# Patient Record
Sex: MALE | Race: WHITE | Employment: OTHER | URBAN - METROPOLITAN AREA
[De-identification: names, ages, dates, MRNs, and addresses within clinical notes are randomized per-mention and may not be internally consistent; named-entity substitution may affect disease eponyms.]

---

## 2021-12-02 ENCOUNTER — OFFICE VISIT (OUTPATIENT)
Dept: SPORTS MEDICINE | Age: 76
End: 2021-12-02
Payer: MEDICARE

## 2021-12-02 VITALS — BODY MASS INDEX: 25.77 KG/M2 | TEMPERATURE: 96.8 F | WEIGHT: 180 LBS | HEIGHT: 70 IN

## 2021-12-02 DIAGNOSIS — M19.011 PRIMARY OSTEOARTHRITIS OF RIGHT SHOULDER: Primary | ICD-10-CM

## 2021-12-02 PROCEDURE — 99214 OFFICE O/P EST MOD 30 MIN: CPT | Performed by: FAMILY MEDICINE

## 2021-12-02 RX ORDER — DICLOFENAC SODIUM 20 MG/G
2 SOLUTION TOPICAL 2 TIMES DAILY
Qty: 112 G | Refills: 0 | Status: SHIPPED | OUTPATIENT
Start: 2021-12-02

## 2021-12-02 ASSESSMENT — ENCOUNTER SYMPTOMS
SINUS PAIN: 0
EYE DISCHARGE: 0
ABDOMINAL DISTENTION: 0
APNEA: 0
CHEST TIGHTNESS: 0
FACIAL SWELLING: 0

## 2021-12-02 NOTE — PROGRESS NOTES
[facility] ([facility]) Physicians  Neurosurgery and Pain [facility]  [address], [address] [facility] , [address] [facility], [place] [address]: [phone]  F: [phone]      Peter Garcia  (1945)    12/2/2021    Subjective:     Peter Garcia is 68 y.o. male who complains today of:    Chief Complaint   Patient presents with    Shoulder Pain     Right Shouldr Pain - Started about a Month Ago       HPI     Patient comes in complaining of right shoulder pain for about a month's time he denies any trauma states has had shoulder problems in the past and noticed it more as he is been playing some golf he says he also had some other issues taken care of and and he thinks that it may have played a role in his shoulder pain he had some surgeries of his back  Allergies:  Sulfa antibiotics    Past Medical History:   Diagnosis Date    CAD (coronary artery disease)     Cancer ([address]     Osteoarthritis      Past Surgical History:   Procedure Laterality Date    CARPAL TUNNEL RELEASE      KNEE SURGERY      SPINE SURGERY       No family history on file. Social History     Socioeconomic History    Marital status:      Spouse name: Not on file    Number of children: Not on file    Years of education: Not on file    Highest education level: Not on file   Occupational History    Not on file   Tobacco Use    Smoking status: Never Smoker    Smokeless tobacco: Never Used   Substance and Sexual Activity    Alcohol use:  Yes     Alcohol/week: 1.0 standard drink     Types: 1 Glasses of wine per week    Drug use: Never    Sexual activity: Not on file   Other Topics Concern    Not on file   Social History Narrative    Not on file     Social Determinants of Health     Financial Resource Strain:     Difficulty of Paying Living Expenses: Not on file   Food Insecurity:     Worried About Running Out of Food in the Last Year: Not on file    Janes of Food in the Last Year: Not on file   Transportation Needs:     dizziness. Hematological: Negative for adenopathy. Psychiatric/Behavioral: Negative for agitation, behavioral problems and confusion. Objective:     Vitals:  Temp 96.8 °F (36 °C) (Infrared)   Ht 5' 10\" (1.778 m)   Wt 180 lb (81.6 kg)   BMI 25.83 kg/m² Pain Score:   3      Physical Exam  Constitutional:       Appearance: Normal appearance. Skin:     General: Skin is warm and dry. Neurological:      Mental Status: He is alert. Ortho Exam   Examination of the right shoulder revealed the neurovascular muscular status to be intact mediocre range of motion of only about 100 degrees abduction forward flexion rotator cuff intact positive [place] sign positive impingement signs    Assessment:      Diagnosis Orders   1. Primary osteoarthritis of right shoulder  Ambulatory referral to Physical Therapy    XR SHOULDER RIGHT (MIN 2 VIEWS)       Plan:   Status sent for evaluation and treatment and get some shoulder x-rays as well as send the PT also can have him try some Pennsaid see him back in 3 weeks if he is no better consider further testing       Orders Placed This Encounter   Medications    Diclofenac Sodium (PENNSAID) 2 % SOLN     Sig: Apply 2 Pump topically 2 times daily     Dispense:  112 g     Refill:  0       Orders Placed This Encounter   Procedures    XR SHOULDER RIGHT (MIN 2 VIEWS)     Standing Status:   Future     Standing Expiration Date:   12/2/2022    Ambulatory referral to Physical Therapy     Referral Priority:   Routine     Referral Type:   Physical Medicine     Requested Specialty:   Physical Therapy     Number of Visits Requested:   1         Follow up:  Return in about 3 weeks (around 12/23/2021).     TEREZA DUFFY DO

## 2021-12-09 ENCOUNTER — TELEPHONE (OUTPATIENT)
Dept: PAIN MANAGEMENT | Age: 76
End: 2021-12-09

## 2022-01-11 ENCOUNTER — OFFICE VISIT (OUTPATIENT)
Dept: SPORTS MEDICINE | Age: 77
End: 2022-01-11
Payer: MEDICARE

## 2022-01-11 ENCOUNTER — TELEPHONE (OUTPATIENT)
Dept: PAIN MANAGEMENT | Age: 77
End: 2022-01-11

## 2022-01-11 VITALS — BODY MASS INDEX: 26.48 KG/M2 | TEMPERATURE: 96.8 F | WEIGHT: 185 LBS | HEIGHT: 70 IN

## 2022-01-11 DIAGNOSIS — M19.011 PRIMARY OSTEOARTHRITIS OF RIGHT SHOULDER: Primary | ICD-10-CM

## 2022-01-11 PROCEDURE — 20611 DRAIN/INJ JOINT/BURSA W/US: CPT | Performed by: FAMILY MEDICINE

## 2022-01-11 RX ORDER — LIDOCAINE HYDROCHLORIDE 10 MG/ML
3 INJECTION, SOLUTION INFILTRATION; PERINEURAL ONCE
Status: COMPLETED | OUTPATIENT
Start: 2022-01-11 | End: 2022-01-11

## 2022-01-11 RX ORDER — BUPIVACAINE HYDROCHLORIDE 5 MG/ML
3 INJECTION, SOLUTION EPIDURAL; INTRACAUDAL ONCE
Status: COMPLETED | OUTPATIENT
Start: 2022-01-11 | End: 2022-01-11

## 2022-01-11 RX ORDER — BETAMETHASONE SODIUM PHOSPHATE AND BETAMETHASONE ACETATE 3; 3 MG/ML; MG/ML
12 INJECTION, SUSPENSION INTRA-ARTICULAR; INTRALESIONAL; INTRAMUSCULAR; SOFT TISSUE ONCE
Status: COMPLETED | OUTPATIENT
Start: 2022-01-11 | End: 2022-01-11

## 2022-01-11 RX ADMIN — BUPIVACAINE HYDROCHLORIDE 15 MG: 5 INJECTION, SOLUTION EPIDURAL; INTRACAUDAL at 15:13

## 2022-01-11 RX ADMIN — BETAMETHASONE SODIUM PHOSPHATE AND BETAMETHASONE ACETATE 12 MG: 3; 3 INJECTION, SUSPENSION INTRA-ARTICULAR; INTRALESIONAL; INTRAMUSCULAR; SOFT TISSUE at 15:14

## 2022-01-11 RX ADMIN — LIDOCAINE HYDROCHLORIDE 3 ML: 10 INJECTION, SOLUTION INFILTRATION; PERINEURAL at 15:12

## 2022-01-11 ASSESSMENT — PAIN SCALES - GENERAL: PAINLEVEL_OUTOF10: 3

## 2022-03-31 ENCOUNTER — OFFICE VISIT (OUTPATIENT)
Dept: SPORTS MEDICINE | Age: 77
End: 2022-03-31
Payer: MEDICARE

## 2022-03-31 VITALS
HEIGHT: 70 IN | WEIGHT: 185 LBS | BODY MASS INDEX: 26.48 KG/M2 | DIASTOLIC BLOOD PRESSURE: 72 MMHG | TEMPERATURE: 96.2 F | SYSTOLIC BLOOD PRESSURE: 130 MMHG

## 2022-03-31 DIAGNOSIS — M12.811 ROTATOR CUFF ARTHROPATHY, RIGHT: Primary | ICD-10-CM

## 2022-03-31 PROCEDURE — 99214 OFFICE O/P EST MOD 30 MIN: CPT | Performed by: FAMILY MEDICINE

## 2022-03-31 ASSESSMENT — ENCOUNTER SYMPTOMS
SINUS PAIN: 0
CHEST TIGHTNESS: 0
EYE DISCHARGE: 0
APNEA: 0
FACIAL SWELLING: 0
ABDOMINAL DISTENTION: 0

## 2022-03-31 NOTE — PROGRESS NOTES
 Days of Exercise per Week: Not on file    Minutes of Exercise per Session: Not on file   Stress:     Feeling of Stress : Not on file   Social Connections:     Frequency of Communication with Friends and Family: Not on file    Frequency of Social Gatherings with Friends and Family: Not on file    Attends Sikhism Services: Not on file    Active Member of [address] [place] [address] [employer] or Organizations: Not on file    Attends Club or Organization Meetings: Not on file    Marital Status: Not on file   Intimate Partner Violence:     Fear of Current or Ex-Partner: Not on file    Emotionally Abused: Not on file    Physically Abused: Not on file    Sexually Abused: Not on file   Housing Stability:     Unable to Pay for Housing in the Last Year: Not on file    Number of [place] in the Last Year: Not on file    Unstable Housing in the Last Year: Not on file       Current Outpatient Medications on File Prior to Visit   Medication Sig Dispense Refill    Diclofenac Sodium (PENNSAID) 2 % SOLN Apply 2 Pump topically 2 times daily 112 g 0    amLODIPine (NORVASC) 10 MG tablet TAKE 1 TABLET BY MOUTH EVERY DAY      dexlansoprazole (DEXILANT) 60 MG CPDR delayed release capsule TK 1 C PO QAM      losartan (COZAAR) 100 MG tablet TAKE 1 TABLET BY MOUTH EVERY DAY      rosuvastatin (CRESTOR) 20 MG tablet TAKE 1 TABLET BY MOUTH EVERY DAY       No current facility-administered medications on file prior to visit. Review of Systems   Constitutional: Negative for activity change and fever. HENT: Negative for congestion, facial swelling and sinus pain. Eyes: Negative for discharge. Respiratory: Negative for apnea and chest tightness. Gastrointestinal: Negative for abdominal distention. Endocrine: Negative for cold intolerance. Genitourinary: Negative for difficulty urinating and dysuria. Allergic/Immunologic: Negative for immunocompromised state. Neurological: Negative for dizziness.    Hematological: Negative for adenopathy. Psychiatric/Behavioral: Negative for agitation, behavioral problems and confusion. Objective:     Vitals:  /72 (Site: Left Upper Arm)   Temp 96.2 °F (35.7 °C) (Infrared)   Ht 5' 10\" (1.778 m)   Wt 185 lb (83.9 kg)   BMI 26.54 kg/m² Pain Score:   5      Physical Exam  Constitutional:       Appearance: Normal appearance. Skin:     General: Skin is warm and dry. Neurological:      Mental Status: He is alert. Ortho Exam   Examination of the right shoulder revealed the neurovascular muscular status to be intact there is positive supraspinatus sign limited range of motion globally about 100 degrees of abduction and forward flexion internal rotation is to L1 equivocal [place] signs equivocal impingement signs    Reviewed x-rays of the right shoulder done in February by previous provider    Assessment:      Diagnosis Orders   1. Rotator cuff arthropathy, right  Amb External Referral To Physical Therapy       Plan: We will start the patient on a PT program and see if he can improve on his range of motion and see him back in 3 to 4 weeks if he is no better consider injection in the shoulder       No orders of the defined types were placed in this encounter. Orders Placed This Encounter   Procedures    Amb External Referral To Physical Therapy     Referral Priority:   Routine     Referral Type:   Consult for Advice and Opinion     Referral Reason:   Patient Preference     Requested Specialty:   Physical Therapy     Number of Visits Requested:   1         Follow up:  Return in about 3 weeks (around 4/21/2022).     TEREZA DUFFY DO

## 2022-05-05 ENCOUNTER — OFFICE VISIT (OUTPATIENT)
Dept: SPORTS MEDICINE | Age: 77
End: 2022-05-05
Payer: MEDICARE

## 2022-05-05 VITALS
BODY MASS INDEX: 26.48 KG/M2 | HEIGHT: 70 IN | SYSTOLIC BLOOD PRESSURE: 128 MMHG | DIASTOLIC BLOOD PRESSURE: 76 MMHG | WEIGHT: 185 LBS | TEMPERATURE: 97.1 F

## 2022-05-05 DIAGNOSIS — M12.811 ROTATOR CUFF ARTHROPATHY, RIGHT: Primary | ICD-10-CM

## 2022-05-05 DIAGNOSIS — M19.011 PRIMARY OSTEOARTHRITIS OF RIGHT SHOULDER: ICD-10-CM

## 2022-05-05 PROCEDURE — 99213 OFFICE O/P EST LOW 20 MIN: CPT | Performed by: FAMILY MEDICINE

## 2022-05-05 ASSESSMENT — ENCOUNTER SYMPTOMS
APNEA: 0
ABDOMINAL DISTENTION: 0
EYE DISCHARGE: 0
SINUS PAIN: 0
FACIAL SWELLING: 0
CHEST TIGHTNESS: 0

## 2022-05-05 NOTE — PROGRESS NOTES
[facility]) Physicians  Neurosurgery and Pain [facility]  [address] [facility] , [address] [facility], [facility]: [phone]  F: [phone]      Eneida Loo  ([id number])    5/5/2022    Subjective:     Eneida Loo is 68 y.o. male who complains today of:    Chief Complaint   Patient presents with    Follow-up     Right Shoulder Pain - Reports decreased pain. HPI     Is in stating that he is doing a lot better he is able to golf without pain he can sleep without pain gets a little irritation when he does some of his exercises  Allergies:  Sulfa antibiotics    Past Medical History:   Diagnosis Date    CAD (coronary artery disease)     [address] [address]     Osteoarthritis      Past Surgical History:   Procedure Laterality Date    CARPAL TUNNEL RELEASE      KNEE SURGERY      SPINE SURGERY       History reviewed. No pertinent family history. Social History     Socioeconomic History    Marital status:      Spouse name: Not on file    Number of children: Not on file    Years of education: Not on file    Highest education level: Not on file   Occupational History    Not on file   Tobacco Use    Smoking status: Never Smoker    Smokeless tobacco: Never Used   Substance and Sexual Activity    Alcohol use: Yes     Alcohol/week: 1.0 standard drink     Types: 1 Glasses of wine per week    Drug use: Never    Sexual activity: Not on file   Other Topics Concern    Not on file   Social History Narrative    Not on file     Social Determinants of Health     Financial Resource Strain:     Difficulty of Paying Living Expenses: Not on file   Food Insecurity:     Worried About Running Out of Food in the Last Year: Not on file    Janes of Food in the Last Year: Not on file   Transportation Needs:     Lack of Transportation (Medical): Not on file    Lack of Transportation (Non-Medical):  Not on file   Physical Activity:     Days of Exercise per Week: Not on file    Minutes of Exercise per Session: Not on file   Stress:     Feeling of Stress : Not on file   Social Connections:     Frequency of Communication with Friends and Family: Not on file    Frequency of Social Gatherings with Friends and Family: Not on file    Attends Protestant Services: Not on file    Active Member of [employer] or Organizations: Not on file    Attends Club or Organization Meetings: Not on file    Marital Status: Not on file   Intimate Partner Violence:     Fear of Current or Ex-Partner: Not on file    Emotionally Abused: Not on file    Physically Abused: Not on file    Sexually Abused: Not on file   Housing Stability:     Unable to Pay for Housing in the Last Year: Not on file    Number of [place] in the Last Year: Not on file    Unstable Housing in the Last Year: Not on file       Current Outpatient Medications on File Prior to Visit   Medication Sig Dispense Refill    Diclofenac Sodium (PENNSAID) 2 % SOLN Apply 2 Pump topically 2 times daily 112 g 0    amLODIPine (NORVASC) 10 MG tablet TAKE 1 TABLET BY MOUTH EVERY DAY      dexlansoprazole (DEXILANT) 60 MG CPDR delayed release capsule TK 1 C PO QAM      losartan (COZAAR) 100 MG tablet TAKE 1 TABLET BY MOUTH EVERY DAY      rosuvastatin (CRESTOR) 20 MG tablet TAKE 1 TABLET BY MOUTH EVERY DAY       No current facility-administered medications on file prior to visit. Review of Systems   Constitutional: Negative for activity change and fever. HENT: Negative for congestion, facial swelling and sinus pain. Eyes: Negative for discharge. Respiratory: Negative for apnea and chest tightness. Gastrointestinal: Negative for abdominal distention. Endocrine: Negative for cold intolerance. Genitourinary: Negative for difficulty urinating and dysuria. Allergic/Immunologic: Negative for immunocompromised state. Neurological: Negative for dizziness. Hematological: Negative for adenopathy.    Psychiatric/Behavioral: Negative for agitation, behavioral problems and confusion. Objective:     Vitals:  /76 (Site: Left Upper Arm, Position: Sitting, Cuff Size: Medium Adult)   Temp 97.1 °F (36.2 °C) (Infrared)   Ht 5' 10\" (1.778 m)   Wt 185 lb (83.9 kg)   BMI 26.54 kg/m² Pain Score:   3      Physical Exam  Constitutional:       Appearance: Normal appearance. Skin:     General: Skin is warm and dry. Neurological:      Mental Status: He is alert. Ortho Exam   Examination of the right shoulder reveals about 120-125 degrees of abduction and forward flexion internal rotation is to about T12 there cuff intact no instabilities neurovascular muscular status was intact    Assessment:      Diagnosis Orders   1. Rotator cuff arthropathy, right     2. Primary osteoarthritis of right shoulder         Plan:   The left any injections at this point have him continue home PT program told to come in for an injection if he gets a flareup       No orders of the defined types were placed in this encounter. No orders of the defined types were placed in this encounter. Follow up:  Return if symptoms worsen or fail to improve.     TEREZA DUFFY, DO

## 2022-08-02 ENCOUNTER — OFFICE VISIT (OUTPATIENT)
Dept: SPORTS MEDICINE | Age: 77
End: 2022-08-02
Payer: MEDICARE

## 2022-08-02 VITALS — TEMPERATURE: 97 F | HEIGHT: 70 IN | WEIGHT: 185 LBS | BODY MASS INDEX: 26.48 KG/M2

## 2022-08-02 DIAGNOSIS — M12.811 ROTATOR CUFF ARTHROPATHY, RIGHT: Primary | ICD-10-CM

## 2022-08-02 PROCEDURE — 99999 PR OFFICE/OUTPT VISIT,PROCEDURE ONLY: CPT | Performed by: FAMILY MEDICINE

## 2022-08-02 PROCEDURE — 20611 DRAIN/INJ JOINT/BURSA W/US: CPT | Performed by: FAMILY MEDICINE

## 2022-08-02 RX ORDER — BUPIVACAINE HYDROCHLORIDE 5 MG/ML
3 INJECTION, SOLUTION EPIDURAL; INTRACAUDAL ONCE
Status: COMPLETED | OUTPATIENT
Start: 2022-08-02 | End: 2022-08-02

## 2022-08-02 RX ORDER — BETAMETHASONE SODIUM PHOSPHATE AND BETAMETHASONE ACETATE 3; 3 MG/ML; MG/ML
12 INJECTION, SUSPENSION INTRA-ARTICULAR; INTRALESIONAL; INTRAMUSCULAR; SOFT TISSUE ONCE
Status: COMPLETED | OUTPATIENT
Start: 2022-08-02 | End: 2022-08-02

## 2022-08-02 RX ORDER — LIDOCAINE HYDROCHLORIDE 10 MG/ML
3 INJECTION, SOLUTION INFILTRATION; PERINEURAL ONCE
Status: COMPLETED | OUTPATIENT
Start: 2022-08-02 | End: 2022-08-02

## 2022-08-02 RX ADMIN — BETAMETHASONE SODIUM PHOSPHATE AND BETAMETHASONE ACETATE 12 MG: 3; 3 INJECTION, SUSPENSION INTRA-ARTICULAR; INTRALESIONAL; INTRAMUSCULAR; SOFT TISSUE at 14:16

## 2022-08-02 RX ADMIN — LIDOCAINE HYDROCHLORIDE 3 ML: 10 INJECTION, SOLUTION INFILTRATION; PERINEURAL at 14:16

## 2022-08-02 RX ADMIN — BUPIVACAINE HYDROCHLORIDE 15 MG: 5 INJECTION, SOLUTION EPIDURAL; INTRACAUDAL at 14:17

## 2022-08-02 ASSESSMENT — PAIN DESCRIPTION - ORIENTATION: ORIENTATION: RIGHT

## 2022-08-02 ASSESSMENT — PAIN DESCRIPTION - LOCATION: LOCATION: SHOULDER

## 2022-08-02 ASSESSMENT — PAIN SCALES - GENERAL: PAINLEVEL_OUTOF10: 2

## 2022-08-02 ASSESSMENT — PAIN DESCRIPTION - DESCRIPTORS: DESCRIPTORS: DISCOMFORT;ACHING;SORE

## 2022-08-02 NOTE — PROGRESS NOTES
@[facility]@   Spine Surgery  Advanced Pain Management           Provider: Tigist Dang DO          Patient Name: Antwan Beverly : 1945         Date: 22          PROCEDURE: US Shoulder Injection  Dx: right rotator cuff arthropathy    After informed consent patient was put in a seated position, the right shoulder was exposed and draped. Using msk US the shoulder joint was identified and prepped with Betadine. Using US guidance a 22g needle was used to inject 2cc celestone, 3cc lidocaine, and 3cc marcaine with relief of symptoms.   Pt tolerated procedure well, left stable, told to ice the shoulder today and resume normal activity in 2 days  US images of procedure were saved

## 2022-11-15 ENCOUNTER — OFFICE VISIT (OUTPATIENT)
Dept: SPORTS MEDICINE | Age: 77
End: 2022-11-15
Payer: MEDICARE

## 2022-11-15 VITALS
DIASTOLIC BLOOD PRESSURE: 82 MMHG | TEMPERATURE: 97.6 F | HEIGHT: 70 IN | WEIGHT: 185 LBS | SYSTOLIC BLOOD PRESSURE: 132 MMHG | BODY MASS INDEX: 26.48 KG/M2

## 2022-11-15 DIAGNOSIS — M12.811 ROTATOR CUFF ARTHROPATHY, RIGHT: Primary | ICD-10-CM

## 2022-11-15 PROCEDURE — 99213 OFFICE O/P EST LOW 20 MIN: CPT | Performed by: FAMILY MEDICINE

## 2022-11-15 PROCEDURE — 1123F ACP DISCUSS/DSCN MKR DOCD: CPT | Performed by: FAMILY MEDICINE

## 2022-11-15 RX ORDER — METHOCARBAMOL 750 MG/1
750 TABLET, FILM COATED ORAL
COMMUNITY
Start: 2021-09-24

## 2022-11-15 ASSESSMENT — ENCOUNTER SYMPTOMS
FACIAL SWELLING: 0
ABDOMINAL DISTENTION: 0
EYE DISCHARGE: 0
SINUS PAIN: 0
CHEST TIGHTNESS: 0
APNEA: 0

## 2022-11-15 NOTE — PROGRESS NOTES
[facility] ([facility]) Physicians  Neurosurgery and Pain [facility]  [address], [address] [facility] , [address] [facility], [place] [address]: [phone]  F: [phone]      Claribel Carbalol  (1945)    11/15/2022    Subjective:     Claribel Carballo is 68 y.o. male who complains today of:    Chief Complaint   Patient presents with    Follow-up    Shoulder Pain     Rotator cuff arthropathy, right       HPI     Patient returns stating that he started to feel some cracking and popping in his shoulder is wondering if he needs an injection says it does not hurt him to sleep on it at nighttime does not bother him doing his normal activities  Allergies:  Sulfa antibiotics    Past Medical History:   Diagnosis Date    CAD (coronary artery disease)     Cancer ([address]     Osteoarthritis      Past Surgical History:   Procedure Laterality Date    CARPAL TUNNEL RELEASE      KNEE SURGERY      SPINE SURGERY       History reviewed. No pertinent family history. Social History     Socioeconomic History    Marital status:      Spouse name: Not on file    Number of children: Not on file    Years of education: Not on file    Highest education level: Not on file   Occupational History    Not on file   Tobacco Use    Smoking status: Never    Smokeless tobacco: Never   Substance and Sexual Activity    Alcohol use:  Yes     Alcohol/week: 1.0 standard drink     Types: 1 Glasses of wine per week    Drug use: Never    Sexual activity: Not on file   Other Topics Concern    Not on file   Social History Narrative    Not on file     Social Determinants of Health     Financial Resource Strain: Not on file   Food Insecurity: Not on file   Transportation Needs: Not on file   Physical Activity: Not on file   Stress: Not on file   Social Connections: Not on file   Intimate Partner Violence: Not on file   Housing Stability: Not on file       Current Outpatient Medications on File Prior to Visit   Medication Sig Dispense Refill    methocarbamol (ROBAXIN) 750 MG tablet Take 750 mg by mouth      Diclofenac Sodium (PENNSAID) 2 % SOLN Apply 2 Pump topically 2 times daily 112 g 0    amLODIPine (NORVASC) 10 MG tablet TAKE 1 TABLET BY MOUTH EVERY DAY      dexlansoprazole (DEXILANT) 60 MG CPDR delayed release capsule TK 1 C PO QAM      losartan (COZAAR) 100 MG tablet TAKE 1 TABLET BY MOUTH EVERY DAY      rosuvastatin (CRESTOR) 20 MG tablet TAKE 1 TABLET BY MOUTH EVERY DAY       No current facility-administered medications on file prior to visit. Review of Systems   Constitutional:  Negative for activity change and fever. HENT:  Negative for congestion, facial swelling and sinus pain. Eyes:  Negative for discharge. Respiratory:  Negative for apnea and chest tightness. Gastrointestinal:  Negative for abdominal distention. Endocrine: Negative for cold intolerance. Genitourinary:  Negative for difficulty urinating and dysuria. Allergic/Immunologic: Negative for immunocompromised state. Neurological:  Negative for dizziness. Hematological:  Negative for adenopathy. Psychiatric/Behavioral:  Negative for agitation, behavioral problems and confusion. Objective:     Vitals:  /82 (Site: Left Upper Arm)   Temp 97.6 °F (36.4 °C) (Temporal)   Ht 5' 10\" (1.778 m)   Wt 185 lb (83.9 kg)   BMI 26.54 kg/m² Pain Score:   2      Physical Exam  Constitutional:       Appearance: Normal appearance. Skin:     General: Skin is warm and dry. Neurological:      Mental Status: He is alert. Ortho Exam   Exam nation of the right shoulder reveals near full range of motion rotator cuffs intact no palpable tenderness was noted no instabilities    Assessment:      Diagnosis Orders   1.  Rotator cuff arthropathy, right            Plan:   Talked about his options and decided against an injection I did give the patient some samples of Pennsaid to try and if it worked we will get him prescription if it does not work we will then consider an injection       No orders of the defined types were placed in this encounter. No orders of the defined types were placed in this encounter. Follow up:  Return in about 3 weeks (around 12/6/2022).     TEREZA DUFFY DO

## 2023-04-02 PROBLEM — Z86.010 HISTORY OF COLON POLYPS: Status: ACTIVE | Noted: 2023-04-02

## 2023-04-02 PROBLEM — K21.9 GERD (GASTROESOPHAGEAL REFLUX DISEASE): Status: ACTIVE | Noted: 2023-04-02

## 2023-04-02 PROBLEM — R31.9 HEMATURIA: Status: ACTIVE | Noted: 2023-04-02

## 2023-04-02 PROBLEM — K22.70 BARRETT'S ESOPHAGUS: Status: ACTIVE | Noted: 2023-04-02

## 2023-04-02 PROBLEM — Z86.0100 HISTORY OF COLON POLYPS: Status: ACTIVE | Noted: 2023-04-02

## 2023-04-02 PROBLEM — M25.512 LEFT SHOULDER PAIN: Status: ACTIVE | Noted: 2023-04-02

## 2023-04-02 PROBLEM — M25.511 RIGHT SHOULDER PAIN: Status: ACTIVE | Noted: 2023-04-02

## 2023-04-02 RX ORDER — ASPIRIN 81 MG/1
TABLET ORAL
COMMUNITY

## 2023-04-02 RX ORDER — AMLODIPINE BESYLATE 10 MG/1
TABLET ORAL
COMMUNITY
Start: 2013-11-12

## 2023-04-02 RX ORDER — DEXLANSOPRAZOLE 60 MG/1
1 CAPSULE, DELAYED RELEASE ORAL EVERY MORNING
COMMUNITY
Start: 2022-03-19

## 2023-04-02 RX ORDER — MULTIVITAMIN
TABLET ORAL
COMMUNITY
End: 2024-04-16 | Stop reason: WASHOUT

## 2023-04-02 RX ORDER — MULTIVITAMIN/IRON/FOLIC ACID 18MG-0.4MG
TABLET ORAL
COMMUNITY
End: 2023-05-24 | Stop reason: SDUPTHER

## 2023-04-02 RX ORDER — ATORVASTATIN CALCIUM 40 MG/1
TABLET, FILM COATED ORAL
COMMUNITY
Start: 2014-05-07 | End: 2023-04-03 | Stop reason: ALTCHOICE

## 2023-04-02 RX ORDER — MELOXICAM 15 MG/1
1 TABLET ORAL DAILY PRN
COMMUNITY
End: 2023-04-03 | Stop reason: ALTCHOICE

## 2023-04-02 RX ORDER — VALSARTAN 320 MG/1
TABLET ORAL
COMMUNITY
End: 2023-04-03 | Stop reason: ALTCHOICE

## 2023-04-02 RX ORDER — EPINEPHRINE 0.22MG
AEROSOL WITH ADAPTER (ML) INHALATION
COMMUNITY

## 2023-04-03 ENCOUNTER — OFFICE VISIT (OUTPATIENT)
Dept: PRIMARY CARE | Facility: CLINIC | Age: 78
End: 2023-04-03
Payer: MEDICARE

## 2023-04-03 VITALS
RESPIRATION RATE: 16 BRPM | SYSTOLIC BLOOD PRESSURE: 166 MMHG | HEART RATE: 86 BPM | OXYGEN SATURATION: 97 % | DIASTOLIC BLOOD PRESSURE: 88 MMHG | TEMPERATURE: 97.7 F | WEIGHT: 190 LBS

## 2023-04-03 DIAGNOSIS — Z98.1 S/P LUMBAR SPINAL FUSION: ICD-10-CM

## 2023-04-03 DIAGNOSIS — I10 PRIMARY HYPERTENSION: Primary | ICD-10-CM

## 2023-04-03 DIAGNOSIS — Z00.00 HEALTH CARE MAINTENANCE: ICD-10-CM

## 2023-04-03 DIAGNOSIS — E78.2 MIXED HYPERLIPIDEMIA: ICD-10-CM

## 2023-04-03 DIAGNOSIS — I25.10 CORONARY ARTERY DISEASE INVOLVING NATIVE CORONARY ARTERY OF NATIVE HEART WITHOUT ANGINA PECTORIS: ICD-10-CM

## 2023-04-03 PROBLEM — D49.0 PAROTID TUMOR: Status: ACTIVE | Noted: 2023-04-03

## 2023-04-03 PROBLEM — D64.9 ANEMIA: Status: ACTIVE | Noted: 2017-05-09

## 2023-04-03 PROBLEM — Z98.890 S/P LAMINECTOMY: Status: ACTIVE | Noted: 2021-09-22

## 2023-04-03 PROBLEM — M16.12 PRIMARY OSTEOARTHRITIS OF LEFT HIP: Status: ACTIVE | Noted: 2017-07-14

## 2023-04-03 PROBLEM — C61 PROSTATE CANCER (MULTI): Status: ACTIVE | Noted: 2023-04-03

## 2023-04-03 PROBLEM — G99.2 MYELOPATHY CONCURRENT WITH AND DUE TO SPINAL STENOSIS OF THORACIC REGION (MULTI): Status: ACTIVE | Noted: 2021-09-22

## 2023-04-03 PROBLEM — M48.04 MYELOPATHY CONCURRENT WITH AND DUE TO SPINAL STENOSIS OF THORACIC REGION (MULTI): Status: ACTIVE | Noted: 2021-09-22

## 2023-04-03 PROBLEM — G89.18 ACUTE POSTOPERATIVE PAIN: Status: ACTIVE | Noted: 2021-09-22

## 2023-04-03 PROBLEM — N28.1 COMPLEX RENAL CYST: Status: ACTIVE | Noted: 2017-02-06

## 2023-04-03 PROBLEM — T88.4XXA DIFFICULT AIRWAY FOR INTUBATION: Status: ACTIVE | Noted: 2021-09-08

## 2023-04-03 PROBLEM — M54.50 ACUTE MIDLINE LOW BACK PAIN: Status: ACTIVE | Noted: 2021-11-09

## 2023-04-03 PROBLEM — R22.0 FACIAL SWELLING: Status: ACTIVE | Noted: 2023-04-03

## 2023-04-03 PROBLEM — H60.90 OTITIS EXTERNA: Status: ACTIVE | Noted: 2023-04-03

## 2023-04-03 PROBLEM — K11.7 SIALOSIS: Status: ACTIVE | Noted: 2023-04-03

## 2023-04-03 PROBLEM — M70.61 TROCHANTERIC BURSITIS OF RIGHT HIP: Status: ACTIVE | Noted: 2018-09-13

## 2023-04-03 PROBLEM — M48.061 SPINAL STENOSIS OF LUMBAR REGION WITH RADICULOPATHY: Status: ACTIVE | Noted: 2017-01-03

## 2023-04-03 PROBLEM — M54.16 SPINAL STENOSIS OF LUMBAR REGION WITH RADICULOPATHY: Status: ACTIVE | Noted: 2017-01-03

## 2023-04-03 PROBLEM — M12.811 ROTATOR CUFF ARTHROPATHY, RIGHT: Status: ACTIVE | Noted: 2022-03-31

## 2023-04-03 PROCEDURE — 1157F ADVNC CARE PLAN IN RCRD: CPT | Performed by: STUDENT IN AN ORGANIZED HEALTH CARE EDUCATION/TRAINING PROGRAM

## 2023-04-03 PROCEDURE — 99203 OFFICE O/P NEW LOW 30 MIN: CPT | Performed by: STUDENT IN AN ORGANIZED HEALTH CARE EDUCATION/TRAINING PROGRAM

## 2023-04-03 PROCEDURE — 1159F MED LIST DOCD IN RCRD: CPT | Performed by: STUDENT IN AN ORGANIZED HEALTH CARE EDUCATION/TRAINING PROGRAM

## 2023-04-03 PROCEDURE — 1160F RVW MEDS BY RX/DR IN RCRD: CPT | Performed by: STUDENT IN AN ORGANIZED HEALTH CARE EDUCATION/TRAINING PROGRAM

## 2023-04-03 PROCEDURE — 3079F DIAST BP 80-89 MM HG: CPT | Performed by: STUDENT IN AN ORGANIZED HEALTH CARE EDUCATION/TRAINING PROGRAM

## 2023-04-03 PROCEDURE — 3077F SYST BP >= 140 MM HG: CPT | Performed by: STUDENT IN AN ORGANIZED HEALTH CARE EDUCATION/TRAINING PROGRAM

## 2023-04-03 PROCEDURE — 1036F TOBACCO NON-USER: CPT | Performed by: STUDENT IN AN ORGANIZED HEALTH CARE EDUCATION/TRAINING PROGRAM

## 2023-04-03 RX ORDER — LOSARTAN POTASSIUM 100 MG/1
100 TABLET ORAL DAILY
COMMUNITY

## 2023-04-03 RX ORDER — ALLOPURINOL 300 MG/1
300 TABLET ORAL DAILY
COMMUNITY

## 2023-04-03 RX ORDER — FLUTICASONE PROPIONATE 50 MCG
SPRAY, SUSPENSION (ML) NASAL
COMMUNITY
Start: 2016-07-14

## 2023-04-03 RX ORDER — TAMSULOSIN HYDROCHLORIDE 0.4 MG/1
2 CAPSULE ORAL DAILY
COMMUNITY
Start: 2023-03-14

## 2023-04-03 RX ORDER — ROSUVASTATIN CALCIUM 20 MG/1
1 TABLET, COATED ORAL DAILY
COMMUNITY
Start: 2019-12-25

## 2023-04-03 RX ORDER — ASCORBIC ACID 500 MG
500 TABLET ORAL
COMMUNITY
Start: 2017-09-21 | End: 2024-04-16 | Stop reason: WASHOUT

## 2023-04-03 ASSESSMENT — PATIENT HEALTH QUESTIONNAIRE - PHQ9
1. LITTLE INTEREST OR PLEASURE IN DOING THINGS: NOT AT ALL
2. FEELING DOWN, DEPRESSED OR HOPELESS: NOT AT ALL
SUM OF ALL RESPONSES TO PHQ9 QUESTIONS 1 AND 2: 0

## 2023-04-03 ASSESSMENT — ENCOUNTER SYMPTOMS
VOMITING: 0
FEVER: 0
DIAPHORESIS: 0
NAUSEA: 0
CHILLS: 0
LIGHT-HEADEDNESS: 0
DIZZINESS: 0
DYSURIA: 0
SHORTNESS OF BREATH: 0

## 2023-04-03 ASSESSMENT — PAIN SCALES - GENERAL: PAINLEVEL: 0-NO PAIN

## 2023-04-03 NOTE — ASSESSMENT & PLAN NOTE
Continue follow-up with cardiology.  Continue management per cardiology including rosuvastatin 20 mg a day, aspirin 81 mg a day

## 2023-04-03 NOTE — ASSESSMENT & PLAN NOTE
Blood pressure elevated today though appears to be controlled at home.  Continue home losartan 100 mg a day, amlodipine 10 mg a day, follow-up with cardiology.

## 2023-04-03 NOTE — ASSESSMENT & PLAN NOTE
Continue follow-up per Naz Murphy and Dr. Main, plan per them.  Advised to discuss low back soreness with them and his sports medicine doctor.

## 2023-04-03 NOTE — PROGRESS NOTES
Subjective   Patient ID: Guero Cope is a 77 y.o. male who presents for Establish Care.  He is here to establish care. Prior PCP was Dr. Kimble. No acute concerns today.    PMH: Prostate cancer (Sees Dr. Fay)    Current specialists:  Dr. Fay - Urology  Dr. Roque - Cardiology  Dr. Main-  [facility]  Dr. Bhatti - Sports Med  Dr. Madi Contreras - Dermaology  Dr. Johnny Ortiz did his knee surgeries.    Reports hx of allergies and takes medrol packs at times to help with allergies.     He has 2 knees, hip, torn rotator cuff, back surgeries.     He has low back soreness after working for quite some time in the yard. Feels subjectively week at times. He has tingling in the bottom of his feet. He's seen neurology for this.     Social history:  Adopted so doesn't know his family history. He has been  for 52 years with his wife. He has 2 daughters, one in [place] who runs a very large sports bar. Another daughter lives in [facility] [place] [facility]. [employer] at [place]. [employer] was grad studies, Post grad work was done at [place]. Worked at [employer] for most of his life.         Review of Systems   Constitutional:  Negative for chills, diaphoresis and fever.   HENT:  Negative for hearing loss.    Eyes:  Negative for visual disturbance.   Respiratory:  Negative for shortness of breath.    Cardiovascular:  Negative for chest pain.   Gastrointestinal:  Negative for nausea and vomiting.   Genitourinary:  Negative for dysuria.   Skin:  Negative for rash.   Neurological:  Negative for dizziness and light-headedness.       /88 (BP Location: Left arm, Patient Position: Sitting, BP Cuff Size: Adult)   Pulse 86   Temp 36.5 °C (97.7 °F) (Temporal)   Resp 16   Wt 86.2 kg (190 lb)   SpO2 97%     Objective   Physical Exam  Vitals reviewed.   Constitutional:       General: He is not in acute distress.     Appearance: Normal appearance.   HENT:      Head: Normocephalic.   Cardiovascular:      Rate and Rhythm:  Normal rate and regular rhythm.   Pulmonary:      Effort: Pulmonary effort is normal. No respiratory distress.      Breath sounds: Normal breath sounds.   Abdominal:      General: There is no distension.   Musculoskeletal:         General: No deformity.   Skin:     Coloration: Skin is not jaundiced.   Neurological:      Mental Status: He is alert.         Assessment/Plan   Problem List Items Addressed This Visit          Nervous    S/P lumbar spinal fusion     Continue follow-up per Naz Murphy and Dr. Main, plan per them.  Advised to discuss low back soreness with them and his sports medicine doctor.            Circulatory    Primary hypertension - Primary     Blood pressure elevated today though appears to be controlled at home.  Continue home losartan 100 mg a day, amlodipine 10 mg a day, follow-up with cardiology.         Coronary artery disease involving native coronary artery of native heart without angina pectoris     Continue follow-up with cardiology.  Continue management per cardiology including rosuvastatin 20 mg a day, aspirin 81 mg a day            Other    Mixed hyperlipidemia     Continue rosuvastatin 20 mg a day and aspirin.         Health care maintenance     Continue follow-up with Dr. Madi Contreras with dermatology.  Follow-up with me in 3 to 6 months for complete physical exam or sooner if needed.  We will discuss lab work at that time.  Will need to update surgical history after it populates from the electronic medical record..

## 2023-04-03 NOTE — ASSESSMENT & PLAN NOTE
Continue follow-up with Dr. Madi Contreras with dermatology.  Follow-up with me in 3 to 6 months for complete physical exam or sooner if needed.  We will discuss lab work at that time.  Will need to update surgical history after it populates from the electronic medical record..

## 2023-05-24 ENCOUNTER — APPOINTMENT (OUTPATIENT)
Dept: PRIMARY CARE | Facility: CLINIC | Age: 78
End: 2023-05-24
Payer: MEDICARE

## 2023-05-24 ENCOUNTER — OFFICE VISIT (OUTPATIENT)
Dept: PRIMARY CARE | Facility: CLINIC | Age: 78
End: 2023-05-24
Payer: MEDICARE

## 2023-05-24 VITALS
TEMPERATURE: 97.4 F | WEIGHT: 201.8 LBS | BODY MASS INDEX: 28.89 KG/M2 | SYSTOLIC BLOOD PRESSURE: 135 MMHG | DIASTOLIC BLOOD PRESSURE: 81 MMHG | HEART RATE: 83 BPM | HEIGHT: 70 IN | OXYGEN SATURATION: 95 %

## 2023-05-24 DIAGNOSIS — N13.2 URETERAL STONE WITH HYDRONEPHROSIS: ICD-10-CM

## 2023-05-24 DIAGNOSIS — N20.0 KIDNEY STONES: ICD-10-CM

## 2023-05-24 DIAGNOSIS — N18.31 STAGE 3A CHRONIC KIDNEY DISEASE (MULTI): ICD-10-CM

## 2023-05-24 DIAGNOSIS — Z13.89 SCREENING FOR MULTIPLE CONDITIONS: ICD-10-CM

## 2023-05-24 DIAGNOSIS — Z00.00 ROUTINE HEALTH MAINTENANCE: Primary | ICD-10-CM

## 2023-05-24 DIAGNOSIS — Z98.1 S/P LUMBAR SPINAL FUSION: ICD-10-CM

## 2023-05-24 DIAGNOSIS — I10 PRIMARY HYPERTENSION: ICD-10-CM

## 2023-05-24 DIAGNOSIS — Z00.00 HEALTH CARE MAINTENANCE: ICD-10-CM

## 2023-05-24 DIAGNOSIS — I25.10 CORONARY ARTERY DISEASE INVOLVING NATIVE CORONARY ARTERY OF NATIVE HEART WITHOUT ANGINA PECTORIS: ICD-10-CM

## 2023-05-24 DIAGNOSIS — R73.9 HYPERGLYCEMIA: ICD-10-CM

## 2023-05-24 DIAGNOSIS — Z71.89 ADVANCED DIRECTIVES, COUNSELING/DISCUSSION: ICD-10-CM

## 2023-05-24 DIAGNOSIS — E78.2 MIXED HYPERLIPIDEMIA: ICD-10-CM

## 2023-05-24 DIAGNOSIS — C61 PROSTATE CANCER (MULTI): ICD-10-CM

## 2023-05-24 PROBLEM — S46.019A STRAIN OF ROTATOR CUFF CAPSULE: Status: ACTIVE | Noted: 2022-12-12

## 2023-05-24 PROBLEM — M19.019 PRIMARY LOCALIZED OSTEOARTHROSIS OF SHOULDER REGION: Status: ACTIVE | Noted: 2022-12-12

## 2023-05-24 PROCEDURE — 3079F DIAST BP 80-89 MM HG: CPT | Performed by: STUDENT IN AN ORGANIZED HEALTH CARE EDUCATION/TRAINING PROGRAM

## 2023-05-24 PROCEDURE — 1124F ACP DISCUSS-NO DSCNMKR DOCD: CPT | Performed by: STUDENT IN AN ORGANIZED HEALTH CARE EDUCATION/TRAINING PROGRAM

## 2023-05-24 PROCEDURE — 1036F TOBACCO NON-USER: CPT | Performed by: STUDENT IN AN ORGANIZED HEALTH CARE EDUCATION/TRAINING PROGRAM

## 2023-05-24 PROCEDURE — G0442 ANNUAL ALCOHOL SCREEN 15 MIN: HCPCS | Performed by: STUDENT IN AN ORGANIZED HEALTH CARE EDUCATION/TRAINING PROGRAM

## 2023-05-24 PROCEDURE — 1170F FXNL STATUS ASSESSED: CPT | Performed by: STUDENT IN AN ORGANIZED HEALTH CARE EDUCATION/TRAINING PROGRAM

## 2023-05-24 PROCEDURE — G0444 DEPRESSION SCREEN ANNUAL: HCPCS | Performed by: STUDENT IN AN ORGANIZED HEALTH CARE EDUCATION/TRAINING PROGRAM

## 2023-05-24 PROCEDURE — G0439 PPPS, SUBSEQ VISIT: HCPCS | Performed by: STUDENT IN AN ORGANIZED HEALTH CARE EDUCATION/TRAINING PROGRAM

## 2023-05-24 PROCEDURE — 1159F MED LIST DOCD IN RCRD: CPT | Performed by: STUDENT IN AN ORGANIZED HEALTH CARE EDUCATION/TRAINING PROGRAM

## 2023-05-24 PROCEDURE — 1160F RVW MEDS BY RX/DR IN RCRD: CPT | Performed by: STUDENT IN AN ORGANIZED HEALTH CARE EDUCATION/TRAINING PROGRAM

## 2023-05-24 PROCEDURE — 3075F SYST BP GE 130 - 139MM HG: CPT | Performed by: STUDENT IN AN ORGANIZED HEALTH CARE EDUCATION/TRAINING PROGRAM

## 2023-05-24 PROCEDURE — 1157F ADVNC CARE PLAN IN RCRD: CPT | Performed by: STUDENT IN AN ORGANIZED HEALTH CARE EDUCATION/TRAINING PROGRAM

## 2023-05-24 RX ORDER — MIRABEGRON 50 MG/1
50 TABLET, EXTENDED RELEASE ORAL DAILY
COMMUNITY

## 2023-05-24 ASSESSMENT — ENCOUNTER SYMPTOMS
DIAPHORESIS: 0
NAUSEA: 0
LIGHT-HEADEDNESS: 0
CHILLS: 0
SHORTNESS OF BREATH: 0
DYSURIA: 0
VOMITING: 0
FEVER: 0
DIZZINESS: 0

## 2023-05-24 ASSESSMENT — PATIENT HEALTH QUESTIONNAIRE - PHQ9
SUM OF ALL RESPONSES TO PHQ9 QUESTIONS 1 AND 2: 0
1. LITTLE INTEREST OR PLEASURE IN DOING THINGS: NOT AT ALL
2. FEELING DOWN, DEPRESSED OR HOPELESS: NOT AT ALL

## 2023-05-24 ASSESSMENT — ACTIVITIES OF DAILY LIVING (ADL)
DOING_HOUSEWORK: INDEPENDENT
TAKING_MEDICATION: INDEPENDENT
MANAGING_FINANCES: INDEPENDENT
BATHING: INDEPENDENT
DRESSING: INDEPENDENT
GROCERY_SHOPPING: INDEPENDENT

## 2023-05-25 DIAGNOSIS — E03.8 SUBCLINICAL HYPOTHYROIDISM: Primary | ICD-10-CM

## 2023-05-25 PROBLEM — N18.31 STAGE 3A CHRONIC KIDNEY DISEASE (MULTI): Status: ACTIVE | Noted: 2023-05-25

## 2023-05-25 PROBLEM — R73.9 HYPERGLYCEMIA: Status: ACTIVE | Noted: 2023-05-25

## 2023-05-25 PROBLEM — Z00.00 ROUTINE HEALTH MAINTENANCE: Status: ACTIVE | Noted: 2023-05-25

## 2023-05-26 DIAGNOSIS — E11.9 TYPE 2 DIABETES MELLITUS WITHOUT COMPLICATION, WITHOUT LONG-TERM CURRENT USE OF INSULIN (MULTI): Primary | ICD-10-CM

## 2023-05-26 NOTE — ASSESSMENT & PLAN NOTE
Continue follow-up per Naz Murphy and Dr. Main, plan per them.  Previously advised to discuss low back soreness with them and his sports medicine doctor. Planning for surgery June 2023.

## 2023-05-26 NOTE — ASSESSMENT & PLAN NOTE
Continue management per cardiology including rosuvastatin 20 mg a day, aspirin 81 mg a day. Deferred routine EKG today and plans to follow up with cardiology instead.

## 2023-05-26 NOTE — ASSESSMENT & PLAN NOTE
Named wife HIPOLITO but doesn't have with him. Advised to bring at convenience.    Health Maintenance  -Prostate Cancer Screening: Via urology  -Vaccinations: Reports UTD pneumonia vaccine last winter, flu vaccine, shingles, covid vaccination and booster, UTD TDAP 2026  -Lung Cancer Screening: Not indicated due to age  -AAA Screening: Not inducated due to age  -Colonoscopy: 2025    CPE completed.  Advised to keep a heart healthy, low fat  diet with fruits and veggies like Mediterranean diet.  Advised on the importance of exercise and maintaining 150 minutes of exercise per week (30 minutes per day 5 days a week).  Advised on regular eye and dental visits.  Discussed age appropriate cancer screening, immunizations and recommendations given.  Discussed avoiding illicit drugs and tobacco. Advised on appropriate use of alcohol.  Advised to wear seat belt.  Continue f/u with Derm Dr. Madi Contreras.  F/u 6 months  F/u 1 year for KELLEN

## 2023-05-26 NOTE — ASSESSMENT & PLAN NOTE
Blood pressure 135/81, stable.  Continue home losartan 100 mg a day, amlodipine 10 mg a day, follow-up with cardiology.

## 2023-05-26 NOTE — ASSESSMENT & PLAN NOTE
Congratulated efforts on improving diet. Encouraged fresh vegetables, lean proteins, and 150 min of moderate intensity exercise/week.

## 2023-05-30 ENCOUNTER — OFFICE VISIT (OUTPATIENT)
Dept: SPORTS MEDICINE | Age: 78
End: 2023-05-30

## 2023-05-30 DIAGNOSIS — M12.812 ROTATOR CUFF ARTHROPATHY OF BOTH SHOULDERS: Primary | ICD-10-CM

## 2023-05-30 DIAGNOSIS — M12.811 ROTATOR CUFF ARTHROPATHY OF BOTH SHOULDERS: Primary | ICD-10-CM

## 2023-05-30 NOTE — PROGRESS NOTES
@[facility]@   Spine Surgery  Advanced Pain Management           Provider: Kylah Cazares DO          Patient Name: Gabi Washington : 1945         Date: 23          PROCEDURE: US Shoulder Injection  Dx bilateral rotator cuff arthropathy    After informed consent patient was put in a seated position, the bilateral shoulder was exposed and draped. Using msk US the shoulder joint was identified and prepped with Betadine. Using US guidance a 22g needle was used to inject 2cc celestone, 3cc lidocaine, and 3cc marcaine in each shoulder with relief of symptoms.   Pt tolerated procedure well, left stable, told to ice the shoulder today and resume normal activity in 2 days  US images of procedure were saved

## 2023-05-31 ENCOUNTER — TELEPHONE (OUTPATIENT)
Dept: PRIMARY CARE | Facility: CLINIC | Age: 78
End: 2023-05-31
Payer: MEDICARE

## 2023-07-26 ENCOUNTER — PATIENT OUTREACH (OUTPATIENT)
Dept: CARE COORDINATION | Facility: CLINIC | Age: 78
End: 2023-07-26
Payer: MEDICARE

## 2023-07-26 RX ORDER — PYRIDOSTIGMINE BROMIDE 60 MG/1
30 TABLET ORAL 3 TIMES DAILY
COMMUNITY
Start: 2023-07-24 | End: 2023-08-23

## 2023-07-26 NOTE — PROGRESS NOTES
Discharge Facility: [facility]  Discharge Diagnosis: stroke like symptoms, new onset MG  Admission Date: 7/22/23  Discharge Date: 7/24/23    PCP Appointment Date: 8/1/23  Specialist Appointment Date: 8/3/23 Dr Summers   8/27/23 Dr Robin neuromuscular clinic  Hospital Encounter and Summary: Linked  See discharge assessment below for further details    Engagement  Call Start Time: 1250 (7/26/2023 12:55 PM)    Medications  Medications reviewed with patient/caregiver?: Yes (7/26/2023 12:55 PM)  Is the patient having any side effects they believe may be caused by any medication additions or changes?: No (7/26/2023 12:55 PM)  Does the patient have all medications ordered at discharge?: Yes (7/26/2023 12:55 PM)  Care Management Interventions: No intervention needed (7/26/2023 12:55 PM)  Prescription Comments: on pyridostigmine 60mg every 4 hours while awake (7/26/2023 12:55 PM)  Is the patient taking all medications as directed (includes completed medication regime)?: Yes (7/26/2023 12:55 PM)    Appointments  Does the patient have a primary care provider?: Yes (7/26/2023 12:55 PM)  Care Management Interventions: Verified appointment date/time/provider (7/26/2023 12:55 PM)  Has the patient kept scheduled appointments due by today?: Yes (7/26/2023 12:55 PM)  Care Management Interventions: Educated on importance of keeping appointment (7/26/2023 12:55 PM)    Patient Teaching  Does the patient have access to their discharge instructions?: Yes (7/26/2023 12:55 PM)  Care Management Interventions: Reviewed instructions with patient (7/26/2023 12:55 PM)  What is the patient's perception of their health status since discharge?: Improving (7/26/2023 12:55 PM)  Is the patient/caregiver able to teach back the hierarchy of who to call/visit for symptoms/problems? PCP, Specialist, Home Health nurse, Urgent Care, ED, 911: Yes (7/26/2023 12:55 PM)    Wrap Up  Wrap Up Additional Comments: Patient states he is doing much better since  starting medications. He has appts with EMG and neuromuscular clinic, outpatient speech therapy to begin. (7/26/2023 12:55 PM)  Call End Time: 1255 (7/26/2023 12:55 PM)

## 2023-08-01 ENCOUNTER — APPOINTMENT (OUTPATIENT)
Dept: PRIMARY CARE | Facility: CLINIC | Age: 78
End: 2023-08-01
Payer: MEDICARE

## 2023-08-21 ENCOUNTER — DOCUMENTATION (OUTPATIENT)
Dept: PRIMARY CARE | Facility: CLINIC | Age: 78
End: 2023-08-21
Payer: MEDICARE

## 2023-08-21 ENCOUNTER — PATIENT OUTREACH (OUTPATIENT)
Dept: CARE COORDINATION | Facility: CLINIC | Age: 78
End: 2023-08-21
Payer: MEDICARE

## 2023-08-21 RX ORDER — DAPSONE 100 MG/1
1 TABLET ORAL DAILY
COMMUNITY

## 2023-08-21 RX ORDER — PREDNISONE 20 MG/1
60 TABLET ORAL
COMMUNITY
Start: 2023-08-18 | End: 2023-09-17

## 2023-08-21 RX ORDER — ESCITALOPRAM OXALATE 5 MG/1
5 TABLET ORAL
COMMUNITY
Start: 2023-08-18 | End: 2023-09-17

## 2023-08-21 NOTE — PROGRESS NOTES
Discharge Facility: [facility]  Discharge Diagnosis: MG crisis  Admission Date: 8/6/23  Discharge Date: 8/18/23    PCP Appointment Date: Declined, will see in Sept as scheduled  Specialist Appointment Date:  8/23/23 Dr Caicedo   Hospital Encounter and Summary: Linked   See discharge assessment below for further details    Engagement  Call Start Time: 1420 (8/21/2023  2:28 PM)    Medications  Medications reviewed with patient/caregiver?: Yes (8/21/2023  2:28 PM)  Is the patient having any side effects they believe may be caused by any medication additions or changes?: No (8/21/2023  2:28 PM)  Does the patient have all medications ordered at discharge?: Yes (8/21/2023  2:28 PM)  Care Management Interventions: No intervention needed (8/21/2023  2:28 PM)  Prescription Comments: on mestinon 30mg with meals, prednisone 60mg daily, lexapro 5mg daily, dapsone 100mg daily (8/21/2023  2:28 PM)  Is the patient taking all medications as directed (includes completed medication regime)?: Yes (8/21/2023  2:28 PM)  Care Management Interventions: Provided patient education (8/21/2023  2:28 PM)    Appointments  Does the patient have a primary care provider?: Yes (8/21/2023  2:28 PM)  Care Management Interventions: -- (prefers to keep sept appt with Dr Sloan) (8/21/2023  2:28 PM)  Has the patient kept scheduled appointments due by today?: Yes (8/21/2023  2:28 PM)  Care Management Interventions: Educated on importance of keeping appointment (8/21/2023  2:28 PM)    Self Management  What is the home health agency?: [facility] (8/21/2023  2:28 PM)    Patient Teaching  Does the patient have access to their discharge instructions?: Yes (8/21/2023  2:28 PM)  Care Management Interventions: Reviewed instructions with patient (8/21/2023  2:28 PM)  What is the patient's perception of their health status since discharge?: Improving (8/21/2023  2:28 PM)  Is the patient/caregiver able to teach back the hierarchy of who to call/visit for  symptoms/problems? PCP, Specialist, Home Health nurse, Urgent Care, ED, 911: Yes (8/21/2023  2:28 PM)    Wrap Up  Wrap Up Additional Comments: He has appt with Dr Caicedo on 8/23 neuromuscular doctor, will call with any concerns. (8/21/2023  2:28 PM)  Call End Time: 1425 (8/21/2023  2:28 PM)

## 2023-08-22 ENCOUNTER — TELEPHONE (OUTPATIENT)
Dept: PRIMARY CARE | Facility: CLINIC | Age: 78
End: 2023-08-22
Payer: MEDICARE

## 2023-08-22 NOTE — TELEPHONE ENCOUNTER
Sylvia, a physical therapist at [facility] called ad would like a verbal for PT plan of care once a week for 4 weeks.  Please give verbal.    [phone] Okay to leave detailed vm.

## 2023-09-12 ENCOUNTER — PATIENT OUTREACH (OUTPATIENT)
Dept: CARE COORDINATION | Facility: CLINIC | Age: 78
End: 2023-09-12
Payer: MEDICARE

## 2023-09-12 RX ORDER — MYCOPHENOLATE MOFETIL 500 MG/1
1000 TABLET ORAL 2 TIMES DAILY
COMMUNITY
Start: 2023-09-09 | End: 2023-12-08

## 2023-09-12 RX ORDER — FERROUS SULFATE, DRIED 160(50) MG
1 TABLET, EXTENDED RELEASE ORAL 2 TIMES DAILY
COMMUNITY
Start: 2023-09-09 | End: 2023-12-08

## 2023-09-12 RX ORDER — FAMOTIDINE 20 MG/1
20 TABLET, FILM COATED ORAL 2 TIMES DAILY
COMMUNITY
Start: 2023-09-09 | End: 2023-09-20 | Stop reason: ALTCHOICE

## 2023-09-12 NOTE — PROGRESS NOTES
Discharge Facility: [facility]  Discharge Diagnosis: MG  Admission Date: 8/27/23  Discharge Date: 9/9/23    PCP Appointment Date: 9/20/23  Specialist Appointment Date: 9/25/23 Dr Caicedo neurology  Weekly PLEX treatments  FU with Dr Roque in Jan due to afib  FU with urology Dr Fay or PCP regarding if need further fu on renal cysts, consider MRI kidney if needed     Hospital Encounter and Summary: Linked       Engagement  Call Start Time: 1400 (9/12/2023  2:10 PM)    Medications  Medications reviewed with patient/caregiver?: Yes (9/12/2023  2:10 PM)  Is the patient having any side effects they believe may be caused by any medication additions or changes?: No (9/12/2023  2:10 PM)  Does the patient have all medications ordered at discharge?: Yes (9/12/2023  2:10 PM)  Care Management Interventions: Provided patient education (9/12/2023  2:10 PM)  Prescription Comments: on cellcept 1000mg twice daily, no longer on mestinon, on famotidine 20 mg twice daily, vitamin D and calcium supplement (9/12/2023  2:10 PM)  Is the patient taking all medications as directed (includes completed medication regime)?: Yes (9/12/2023  2:10 PM)  Medication Comments: Reviewed medications and no questions at this time (9/12/2023  2:10 PM)    Appointments  Does the patient have a primary care provider?: Yes (9/12/2023  2:10 PM)  Care Management Interventions: Verified appointment date/time/provider (9/12/2023  2:10 PM)  Has the patient kept scheduled appointments due by today?: Yes (9/12/2023  2:10 PM)  Care Management Interventions: Educated on importance of keeping appointment (9/12/2023  2:10 PM)    Patient Teaching  Does the patient have access to their discharge instructions?: Yes (9/12/2023  2:10 PM)  Care Management Interventions: Reviewed instructions with patient (9/12/2023  2:10 PM)  What is the patient's perception of their health status since discharge?: Improving (9/12/2023  2:10 PM)  Is the patient/caregiver able to teach back the  hierarchy of who to call/visit for symptoms/problems? PCP, Specialist, Home Health nurse, Urgent Care, ED, 911: Yes (9/12/2023  2:10 PM)    Wrap Up  Wrap Up Additional Comments: He will be getting weekly PLEX infusions, starting outpatient physical therapies on 9/15 (9/12/2023  2:10 PM)  Call End Time: 1405 (9/12/2023  2:10 PM)

## 2023-09-20 ENCOUNTER — OFFICE VISIT (OUTPATIENT)
Dept: PRIMARY CARE | Facility: CLINIC | Age: 78
End: 2023-09-20
Payer: MEDICARE

## 2023-09-20 VITALS
SYSTOLIC BLOOD PRESSURE: 121 MMHG | BODY MASS INDEX: 24.77 KG/M2 | HEIGHT: 70 IN | TEMPERATURE: 97.3 F | WEIGHT: 173 LBS | DIASTOLIC BLOOD PRESSURE: 69 MMHG | HEART RATE: 87 BPM | OXYGEN SATURATION: 92 %

## 2023-09-20 DIAGNOSIS — R22.41 LOCALIZED SWELLING OF RIGHT LOWER EXTREMITY: ICD-10-CM

## 2023-09-20 DIAGNOSIS — N28.1 COMPLEX RENAL CYST: ICD-10-CM

## 2023-09-20 DIAGNOSIS — R73.9 HYPERGLYCEMIA: ICD-10-CM

## 2023-09-20 DIAGNOSIS — M43.25 FUSION OF SPINE OF THORACOLUMBAR REGION: ICD-10-CM

## 2023-09-20 DIAGNOSIS — R51.9 FACE PAIN: ICD-10-CM

## 2023-09-20 DIAGNOSIS — S30.0XXA TRAUMATIC HEMATOMA OF BUTTOCK, INITIAL ENCOUNTER: ICD-10-CM

## 2023-09-20 DIAGNOSIS — N20.0 KIDNEY STONES: Primary | ICD-10-CM

## 2023-09-20 DIAGNOSIS — H91.92 HEARING LOSS OF LEFT EAR, UNSPECIFIED HEARING LOSS TYPE: ICD-10-CM

## 2023-09-20 DIAGNOSIS — D69.6 THROMBOCYTOPENIA (CMS-HCC): ICD-10-CM

## 2023-09-20 DIAGNOSIS — E78.2 MIXED HYPERLIPIDEMIA: ICD-10-CM
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
## 2023-09-20 DIAGNOSIS — C61 PROSTATE CANCER (MULTI): ICD-10-CM

## 2023-09-20 DIAGNOSIS — R42 DIZZINESS: ICD-10-CM

## 2023-09-20 DIAGNOSIS — M79.18 RIGHT BUTTOCK PAIN: ICD-10-CM

## 2023-09-20 DIAGNOSIS — I25.10 CORONARY ARTERY DISEASE INVOLVING NATIVE CORONARY ARTERY OF NATIVE HEART WITHOUT ANGINA PECTORIS: ICD-10-CM

## 2023-09-20 DIAGNOSIS — I10 PRIMARY HYPERTENSION: ICD-10-CM

## 2023-09-20 DIAGNOSIS — K76.0 HEPATIC STEATOSIS: ICD-10-CM

## 2023-09-20 DIAGNOSIS — N18.31 STAGE 3A CHRONIC KIDNEY DISEASE (MULTI): ICD-10-CM

## 2023-09-20 DIAGNOSIS — K22.70 BARRETT'S ESOPHAGUS WITHOUT DYSPLASIA: ICD-10-CM

## 2023-09-20 PROBLEM — T38.0X5A STEROID-INDUCED HYPERGLYCEMIA: Status: ACTIVE | Noted: 2023-08-27

## 2023-09-20 PROBLEM — S81.812S SKIN TEAR OF LOWER LEG WITHOUT COMPLICATION, LEFT, SEQUELA: Status: ACTIVE | Noted: 2023-08-30

## 2023-09-20 PROBLEM — L89.301 PRESSURE INJURY OF BUTTOCK, STAGE 1: Status: ACTIVE | Noted: 2023-08-30

## 2023-09-20 PROBLEM — D62 ACUTE POSTOPERATIVE ANEMIA DUE TO EXPECTED BLOOD LOSS: Status: ACTIVE | Noted: 2023-06-08

## 2023-09-20 PROBLEM — G70.01: Status: ACTIVE | Noted: 2023-07-31

## 2023-09-20 PROBLEM — R79.89 ELEVATED SERUM CREATININE: Status: ACTIVE | Noted: 2023-06-08

## 2023-09-20 PROBLEM — N18.2 CKD (CHRONIC KIDNEY DISEASE) STAGE 2, GFR 60-89 ML/MIN: Chronic | Status: ACTIVE | Noted: 2023-08-06

## 2023-09-20 PROBLEM — R13.11 ORAL PHASE DYSPHAGIA: Status: ACTIVE | Noted: 2023-08-08

## 2023-09-20 PROBLEM — E44.1 MALNUTRITION OF MILD DEGREE (MULTI): Status: ACTIVE | Noted: 2023-08-03

## 2023-09-20 PROBLEM — B25.9 CYTOMEGALOVIRUS (CMV) VIREMIA (MULTI): Status: ACTIVE | Noted: 2023-09-07

## 2023-09-20 PROCEDURE — 99495 TRANSJ CARE MGMT MOD F2F 14D: CPT | Performed by: STUDENT IN AN ORGANIZED HEALTH CARE EDUCATION/TRAINING PROGRAM

## 2023-09-20 PROCEDURE — 1159F MED LIST DOCD IN RCRD: CPT | Performed by: STUDENT IN AN ORGANIZED HEALTH CARE EDUCATION/TRAINING PROGRAM

## 2023-09-20 PROCEDURE — 3074F SYST BP LT 130 MM HG: CPT | Performed by: STUDENT IN AN ORGANIZED HEALTH CARE EDUCATION/TRAINING PROGRAM

## 2023-09-20 PROCEDURE — 3078F DIAST BP <80 MM HG: CPT | Performed by: STUDENT IN AN ORGANIZED HEALTH CARE EDUCATION/TRAINING PROGRAM

## 2023-09-20 PROCEDURE — 1036F TOBACCO NON-USER: CPT | Performed by: STUDENT IN AN ORGANIZED HEALTH CARE EDUCATION/TRAINING PROGRAM

## 2023-09-20 PROCEDURE — 1160F RVW MEDS BY RX/DR IN RCRD: CPT | Performed by: STUDENT IN AN ORGANIZED HEALTH CARE EDUCATION/TRAINING PROGRAM

## 2023-09-20 PROCEDURE — 1125F AMNT PAIN NOTED PAIN PRSNT: CPT | Performed by: STUDENT IN AN ORGANIZED HEALTH CARE EDUCATION/TRAINING PROGRAM

## 2023-09-20 ASSESSMENT — PATIENT HEALTH QUESTIONNAIRE - PHQ9
1. LITTLE INTEREST OR PLEASURE IN DOING THINGS: NOT AT ALL
SUM OF ALL RESPONSES TO PHQ9 QUESTIONS 1 AND 2: 0
2. FEELING DOWN, DEPRESSED OR HOPELESS: NOT AT ALL

## 2023-09-20 ASSESSMENT — ENCOUNTER SYMPTOMS
NAUSEA: 0
FATIGUE: 1
DYSURIA: 0
SHORTNESS OF BREATH: 0
VOMITING: 0
DIZZINESS: 0
CHILLS: 0
ABDOMINAL PAIN: 0
DIAPHORESIS: 0
LIGHT-HEADEDNESS: 0
FEVER: 0
DIARRHEA: 0

## 2023-09-20 NOTE — PROGRESS NOTES
"Patient: Fan Cope  : 1945  PCP: Madi Sloan DO  MRN: [id number]  Program: No linked episodes     Fan Cope is a 78 y.o. male presenting today for follow-up after being discharged from the hospital 12 days ago. The main problem requiring admission was Myasthenia Gravis. The discharge summary and/or Transitional Care Management documentation was reviewed. Medication reconciliation was performed as indicated via the \"Lee as Reviewed\" timestamp.     Fan Cope was contacted by Transitional Care Management services two days after his discharge. This encounter and supporting documentation was reviewed.      --------------------------    Per [facility] documentation:     Mr. Cope is 77 y/o M w/ PMHx of newly diagnosed +AChR myasthenia gravis w/o thymoma (23), HTN, HLD, CKDIII, CAD s/p PCI 2017, s/p lumbar spinal fusion, on dapsone for PJP ppx who presented with acute myasthenia gravis exacerbation.     Etiology for exacerbation was thought to be in setting of steroid induced with notable patient non-adherence to prednisone 60mg regimen and mistakenly took 20mg until recently rectified on 23 during his last outpatient neuromuscular appointment.     Was started on IVIG with 1st dose given at OSH ED on 23 with continuation of 0.4mg/kg IVIG for total of 2 doses (23). Pyridostigmine was discontinued due to concerns of worsening secretions. SLP evaluated with initial evaluation for NPO and was started on TF.     Had increased WOB/SOB with hypoxia on  AM and was subsequently transferred to NICU and intubated. Started on mycophenolate mofetil 500mg BID 23.     Started on PLEX on 23 with noted improvement in PFTs. Total of 5 PLEX sessions, completed . Prior auth for outpatient PLEX obtained and scheduled for . Mycophenolate mofetil increased to 1000mg BID . Pt extubated on  with no SOB or hypoxia, on RA at discharge.      Assessed by speech pathology for bedside swallow " evaluation on 9/6 with recs for MBS and NG tube. Pt started on liquid diet and advanced to soft dental diet.      Hematology consulted for thrombocytopenia of unclear etiology. PF4, evaluation for thrombotic/consumptive process negative. Etiology for thrombocytopenia most likely 2/2 acute medical illness. Platelets stable at 130 on discharge.     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 No recs for further management or continued surveillance.      Pt with stable asymptomatic bradycardia as well as intermittent A fib on continuous cardiac telemetry. Also noted to have hx of uric acid kidney stone and bilateral renal cysts. Recommend close outpatient follow-up with PCP, cardiology, urology and neurology.     Plan for discharge  -Outpatient PLEX starting 9/11 once/week then taper to once/2weeks pending course  -Continue cellcept 1g BID  -Continue prednisone 60mg daily  - Continue famotidine 20mg BID prophylaxis               - Continue vit D and calcium supplementation  -SLP/Diet: Soft dental diet (info handout provided)  -PT/OT: Home  -Follow-up with Cardiology (Dr. Roque) - 1/02/2024  -Follow-up with PCP (Dr. Sloan) - scheduled 9/20  -Follow-up with urology (Dr. Fay) ()  -Follow up with neuromuscular Dr. Caicedo 9/25/23  ------------------------    Today:       First noticed symptoms of MG playing golf and hit 30 yards less than usual and slurred speech.     Concerning symptoms today are loss of voice, right ear diminished hearing. Today he woke up and felt firmness on right buttock and has a bruise and some pain, no drainage he is aware of. He was at therapy last Friday and tripped over his cane as he was getting this. No residual symptoms since his fall. No new weakness or neurologic symptoms.     Feels weaker and has lost 40 pounds since recent hospitalization. Initially had trouble swallowing and eating.     The complexity of medical decision making for this  patient's transitional care is moderate.    Physical Exam  Vitals reviewed.   Constitutional:       General: He is not in acute distress.     Appearance: Normal appearance.   HENT:      Head: Normocephalic.   Cardiovascular:      Rate and Rhythm: Normal rate and regular rhythm.   Pulmonary:      Effort: Pulmonary effort is normal. No respiratory distress.      Breath sounds: Normal breath sounds.   Abdominal:      General: There is no distension.   Musculoskeletal:         General: No deformity.      Comments: Right buttock hematoma with tenderness and firmness present, approximately 6 to 8 cm in diameter.   Skin:     Coloration: Skin is not jaundiced.      Comments: Ecchymosis present over the right side of the face and of the right posterior buttock.   Neurological:      General: No focal deficit present.      Mental Status: He is alert.      Cranial Nerves: No cranial nerve deficit.      Sensory: No sensory deficit.      Motor: No weakness.      Coordination: Coordination normal.         Assessment/Plan   Problem List Items Addressed This Visit       Kidney stones - Primary    Relevant Orders    Uric Acid    Hyperglycemia    Relevant Orders    Follow Up In [facility] - Pharmacy     Other Visit Diagnoses       Hearing loss of left ear, unspecified hearing loss type        Relevant Orders    Referral to ENT    Referral to Audiology    Face pain        Relevant Orders    CT head wo IV contrast    CT maxillofacial bones wo IV contrast    Fall, initial encounter        Relevant Orders    CT head wo IV contrast    CT maxillofacial bones wo IV contrast    Dizziness        Relevant Orders    CT head wo IV contrast    CT maxillofacial bones wo IV contrast    Right buttock pain        Relevant Orders    Vascular US lower extremity venous duplex right    Localized swelling of right lower extremity        Relevant Orders    Vascular US lower extremity venous duplex right    Traumatic hematoma of buttock, initial  encounter        Relevant Orders    Vascular US lower extremity venous duplex right            Mysathenia gravis  -Multiple admissions at [facility] in last 2 months  -Interval history per Dr. Caicedo with neurology: He was admitted at  from 8/27/23 to 9/9/23 for MG flare, presented with worsening of speech(losing his voice). He tried IVIG (2 doses) without benefit, had worsening of secretions and hypoxia needing intubation(x5 days). Had 5 cycles of PLEX (8/31 to 9/11/23) which helped, started on mycophenolate (500 mg bid).   -Currently on PLEX weekly, prednisone 60mg/day   -Continue f/u neurology    Fall  Face pain  Dizziness  -CT head and cspine ordered stat given fall and face pain and dizziness    Left sided hearing loss  -Referred to audiology and ENT    Right buttock hematoma  -Given pain and swelling u/s RLE ordered to R/o DVT    Status post lumbar fusion  -Continue follow-up per Naz Murphy and Dr. Main, plan per them.  Previously advised to discuss low back soreness with them and his sports medicine doctor.   -Per last note plan is for PT and f/u with Xray around November 2023.     Primary hypertension  -Continue home losartan 100 mg a day, amlodipine 10 mg a day, follow-up with cardiology.    Coronary artery disease  -Continue management per cardiology including rosuvastatin 20 mg a day, aspirin 81 mg a day. Deferred routine EKG to cardiology appt  -Will see Dr. Roque    Kidney stones  -On allopurinol  -Reordered uric acid level to monitor     Renal cyst  -Seen on imaging in hospitalization, advise f/u with urology    Prostate cancer  - Continue follow-up with Dr. Fay.  Flomax, Myrbetriq, and further management per urology.    Stage IIIa chronic kidney disease  - CKD noted in EMR, GFR is 37 as of most recent  -Repeat BMP in 6 weeks    Hyperglycemia  - Noted on CMP, blood sugars at home in mid 200s.   -Referred to [facility] pharmacy clinic to assist with monitoring glucose while on prednisone.    Khalif's  esophagus  Hiatal hernia  -on dexilant  -Hiatal hernia seen on CT chest 723    Normocytic anemia  -Stable since discharge. Repeat CBC in 6 weeks.     Mild hepatic steatosis seeon on U/s abdomen and spleen. Referred to hepatology.      Health Maintenance  -Prostate Cancer Screening: Via urology  -Vaccinations: Reports UTD pneumonia vaccine last winter, flu vaccine, shingles, covid vaccination and booster, UTD TDAP 2026  -Lung Cancer Screening: Not indicated due to age  -AAA Screening: Not inducated due to age  -Colonoscopy: 2025    F/u 3 months.     Review of Systems   Constitutional:  Positive for fatigue. Negative for chills, diaphoresis and fever.   HENT:  Positive for hearing loss. Negative for ear discharge and ear pain.    Eyes:  Negative for visual disturbance.   Respiratory:  Negative for shortness of breath.    Cardiovascular:  Negative for chest pain.   Gastrointestinal:  Negative for abdominal pain, diarrhea, nausea and vomiting.   Genitourinary:  Negative for dysuria.   Neurological:  Negative for dizziness and light-headedness.       No family history on file.    Engagement  Call Start Time: 1400 (9/12/2023  2:10 PM)    Medications  Medications reviewed with patient/caregiver?: Yes (9/12/2023  2:10 PM)  Is the patient having any side effects they believe may be caused by any medication additions or changes?: No (9/12/2023  2:10 PM)  Does the patient have all medications ordered at discharge?: Yes (9/12/2023  2:10 PM)  Care Management Interventions: Provided patient education (9/12/2023  2:10 PM)  Prescription Comments: on cellcept 1000mg twice daily, no longer on mestinon, on famotidine 20 mg twice daily, vitamin D and calcium supplement (9/12/2023  2:10 PM)  Is the patient taking all medications as directed (includes completed medication regime)?: Yes (9/12/2023  2:10 PM)  Medication Comments: Reviewed medications and no questions at this time (9/12/2023  2:10 PM)    Appointments  Does the patient have  a primary care provider?: Yes (9/12/2023  2:10 PM)  Care Management Interventions: Verified appointment date/time/provider (9/12/2023  2:10 PM)  Has the patient kept scheduled appointments due by today?: Yes (9/12/2023  2:10 PM)  Care Management Interventions: Educated on importance of keeping appointment (9/12/2023  2:10 PM)    Patient Teaching  Does the patient have access to their discharge instructions?: Yes (9/12/2023  2:10 PM)  Care Management Interventions: Reviewed instructions with patient (9/12/2023  2:10 PM)  What is the patient's perception of their health status since discharge?: Improving (9/12/2023  2:10 PM)  Is the patient/caregiver able to teach back the hierarchy of who to call/visit for symptoms/problems? PCP, Specialist, Home Health nurse, Urgent Care, ED, 911: Yes (9/12/2023  2:10 PM)    Wrap Up  Wrap Up Additional Comments: He will be getting weekly PLEX infusions, starting outpatient physical therapies on 9/15 (9/12/2023  2:10 PM)  Call End Time: 1405 (9/12/2023  2:10 PM)        No follow-ups on file.

## 2023-09-22 ENCOUNTER — PATIENT OUTREACH (OUTPATIENT)
Dept: CARE COORDINATION | Facility: CLINIC | Age: 78
End: 2023-09-22
Payer: MEDICARE

## 2023-10-03 ENCOUNTER — PATIENT MESSAGE (OUTPATIENT)
Dept: PRIMARY CARE | Facility: CLINIC | Age: 78
End: 2023-10-03
Payer: MEDICARE

## 2023-10-03 DIAGNOSIS — R79.9 ELEVATED BUN: ICD-10-CM

## 2023-10-03 DIAGNOSIS — D64.9 NORMOCYTIC ANEMIA: Primary | ICD-10-CM

## 2023-10-04 ENCOUNTER — ANCILLARY PROCEDURE (OUTPATIENT)
Dept: RADIOLOGY | Facility: CLINIC | Age: 78
End: 2023-10-04
Payer: MEDICARE

## 2023-10-04 DIAGNOSIS — R42 DIZZINESS: ICD-10-CM
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
## 2023-10-04 DIAGNOSIS — R51.9 FACE PAIN: ICD-10-CM

## 2023-10-04 PROCEDURE — G1004 CDSM NDSC: HCPCS

## 2023-10-04 PROCEDURE — 70450 CT HEAD/BRAIN W/O DYE: CPT | Performed by: RADIOLOGY

## 2023-10-09 ENCOUNTER — APPOINTMENT (OUTPATIENT)
Dept: RADIOLOGY | Facility: CLINIC | Age: 78
End: 2023-10-09
Payer: MEDICARE

## 2023-10-10 DIAGNOSIS — R73.9 HYPERGLYCEMIA: Primary | ICD-10-CM

## 2023-10-12 ASSESSMENT — ENCOUNTER SYMPTOMS: MUSCLE WEAKNESS: 1

## 2023-10-13 ENCOUNTER — LAB (OUTPATIENT)
Dept: LAB | Facility: LAB | Age: 78
End: 2023-10-13
Payer: MEDICARE

## 2023-10-13 ENCOUNTER — OFFICE VISIT (OUTPATIENT)
Dept: PRIMARY CARE | Facility: CLINIC | Age: 78
End: 2023-10-13
Payer: MEDICARE

## 2023-10-13 VITALS
RESPIRATION RATE: 16 BRPM | SYSTOLIC BLOOD PRESSURE: 130 MMHG | HEIGHT: 70 IN | HEART RATE: 82 BPM | DIASTOLIC BLOOD PRESSURE: 68 MMHG | OXYGEN SATURATION: 98 % | WEIGHT: 184 LBS | BODY MASS INDEX: 26.34 KG/M2

## 2023-10-13 DIAGNOSIS — I25.10 CORONARY ARTERY DISEASE INVOLVING NATIVE CORONARY ARTERY OF NATIVE HEART WITHOUT ANGINA PECTORIS: ICD-10-CM

## 2023-10-13 DIAGNOSIS — R23.3 EASY BRUISING: ICD-10-CM

## 2023-10-13 DIAGNOSIS — R22.43 LOCALIZED SWELLING OF BOTH LOWER LEGS: ICD-10-CM

## 2023-10-13 DIAGNOSIS — R53.83 FATIGUE, UNSPECIFIED TYPE: ICD-10-CM

## 2023-10-13 DIAGNOSIS — T14.8XXA SKIN ABRASION: ICD-10-CM

## 2023-10-13 DIAGNOSIS — R22.43 LOCALIZED SWELLING OF BOTH LOWER LEGS: Primary | ICD-10-CM

## 2023-10-13 LAB
ANION GAP SERPL CALC-SCNC: 18 MMOL/L (ref 10–20)
APTT PPP: 26 SECONDS (ref 27–38)
BUN SERPL-MCNC: 28 MG/DL (ref 6–23)
CALCIUM SERPL-MCNC: 9 MG/DL (ref 8.6–10.3)
CHLORIDE SERPL-SCNC: 103 MMOL/L (ref 98–107)
CO2 SERPL-SCNC: 22 MMOL/L (ref 21–32)
CREAT SERPL-MCNC: 1.36 MG/DL (ref 0.5–1.3)
GFR SERPL CREATININE-BSD FRML MDRD: 53 ML/MIN/1.73M*2
GLUCOSE SERPL-MCNC: 376 MG/DL (ref 74–99)
INR PPP: 0.9 (ref 0.9–1.1)
POTASSIUM SERPL-SCNC: 4.3 MMOL/L (ref 3.5–5.3)
PROTHROMBIN TIME: 10 SECONDS (ref 9.8–12.8)
SODIUM SERPL-SCNC: 139 MMOL/L (ref 136–145)

## 2023-10-13 PROCEDURE — 3075F SYST BP GE 130 - 139MM HG: CPT | Performed by: STUDENT IN AN ORGANIZED HEALTH CARE EDUCATION/TRAINING PROGRAM

## 2023-10-13 PROCEDURE — 99214 OFFICE O/P EST MOD 30 MIN: CPT | Performed by: STUDENT IN AN ORGANIZED HEALTH CARE EDUCATION/TRAINING PROGRAM

## 2023-10-13 PROCEDURE — 1160F RVW MEDS BY RX/DR IN RCRD: CPT | Performed by: STUDENT IN AN ORGANIZED HEALTH CARE EDUCATION/TRAINING PROGRAM

## 2023-10-13 PROCEDURE — 85730 THROMBOPLASTIN TIME PARTIAL: CPT

## 2023-10-13 PROCEDURE — 1125F AMNT PAIN NOTED PAIN PRSNT: CPT | Performed by: STUDENT IN AN ORGANIZED HEALTH CARE EDUCATION/TRAINING PROGRAM

## 2023-10-13 PROCEDURE — 93000 ELECTROCARDIOGRAM COMPLETE: CPT | Performed by: STUDENT IN AN ORGANIZED HEALTH CARE EDUCATION/TRAINING PROGRAM

## 2023-10-13 PROCEDURE — 85610 PROTHROMBIN TIME: CPT

## 2023-10-13 PROCEDURE — 1036F TOBACCO NON-USER: CPT | Performed by: STUDENT IN AN ORGANIZED HEALTH CARE EDUCATION/TRAINING PROGRAM

## 2023-10-13 PROCEDURE — 1159F MED LIST DOCD IN RCRD: CPT | Performed by: STUDENT IN AN ORGANIZED HEALTH CARE EDUCATION/TRAINING PROGRAM

## 2023-10-13 PROCEDURE — 80048 BASIC METABOLIC PNL TOTAL CA: CPT

## 2023-10-13 PROCEDURE — 3078F DIAST BP <80 MM HG: CPT | Performed by: STUDENT IN AN ORGANIZED HEALTH CARE EDUCATION/TRAINING PROGRAM

## 2023-10-13 PROCEDURE — 36415 COLL VENOUS BLD VENIPUNCTURE: CPT

## 2023-10-13 ASSESSMENT — ENCOUNTER SYMPTOMS
PALPITATIONS: 0
DIARRHEA: 0
COUGH: 0
VOMITING: 0
SHORTNESS OF BREATH: 0
DIZZINESS: 0
DIAPHORESIS: 0
CHEST TIGHTNESS: 0
NAUSEA: 0
DYSURIA: 0
LIGHT-HEADEDNESS: 0
WHEEZING: 0
CHILLS: 0
ABDOMINAL PAIN: 0
FEVER: 0

## 2023-10-13 NOTE — PROGRESS NOTES
"Subjective   Patient ID: Fan Cope is a 78 y.o. male who presents for Edema.    Pt is here today to follow up on lower leg edema.     Pt states that his bilateral lower legs are weeping for about 1.5 weeks. His feet and legs are swollen. Has tried compression stockings but these have gotten soaked. Feels more fatigued than usual. Denies any pain in the legs, somewhat discomfort in both legs intermittently not persistently.  pt states that his balance is off and physical therapy.     Pt is also following up on test results and results of blood work.      Lower Extremity Issue  Pertinent negatives include no abdominal pain, chest pain, chills, coughing, diaphoresis, fever, nausea or vomiting. The symptoms are aggravated by movement.   Answers submitted by the patient for this visit:  Lower Extremity Injury Questionnaire (Submitted on 10/12/2023)  Chief Complaint: Lower extremity pain  Incident occurred: 5 to 7 days ago  Incident location: at home  Injury mechanism: other  Pain location: left leg, right leg  Pain - numeric: 3/10  Pain course: fluctuating  muscle weakness: Yes  Foreign body present: unknown     Review of Systems   Constitutional:  Negative for chills, diaphoresis and fever.   HENT:  Negative for hearing loss.    Eyes:  Negative for visual disturbance.   Respiratory:  Negative for cough, chest tightness, shortness of breath and wheezing.         No orthopnea, no PND   Cardiovascular:  Positive for leg swelling. Negative for chest pain and palpitations.   Gastrointestinal:  Negative for abdominal pain, diarrhea, nausea and vomiting.   Genitourinary:  Negative for dysuria.   Neurological:  Negative for dizziness and light-headedness.       Objective   /68   Pulse 82   Resp 16   Ht 1.778 m (5' 10\")   Wt 83.5 kg (184 lb)   SpO2 98%   BMI 26.40 kg/m²     Physical Exam  Vitals reviewed.   Constitutional:       General: He is not in acute distress.     Appearance: Normal appearance.   HENT:      " Head: Normocephalic.   Cardiovascular:      Rate and Rhythm: Normal rate and regular rhythm.      Pulses: Normal pulses.   Pulmonary:      Effort: Pulmonary effort is normal. No respiratory distress.      Breath sounds: Normal breath sounds.   Abdominal:      General: There is no distension.   Musculoskeletal:         General: Swelling (2+ bilateral LE edema to knees) present. No deformity.   Skin:     Coloration: Skin is not jaundiced.   Neurological:      Mental Status: He is alert.         Assessment/Plan   Problem List Items Addressed This Visit       Coronary artery disease involving native coronary artery of native heart without angina pectoris    Relevant Orders    Transthoracic Echo (TTE) Complete    Basic metabolic panel (Completed)    ECG 12 lead (Clinic Performed) (Completed)     Other Visit Diagnoses       Localized swelling of both lower legs    -  Primary    Relevant Orders    Transthoracic Echo (TTE) Complete    Basic metabolic panel (Completed)    Referral to Wound Clinic    Fatigue, unspecified type        Relevant Orders    Transthoracic Echo (TTE) Complete    Basic metabolic panel (Completed)    Easy bruising        Relevant Orders    Protime-INR (Completed)    APTT (Completed)    Skin abrasion        Relevant Orders    Referral to Wound Clinic          Bilateral lower extremity swelling  Fatigue  CAD  Easy bruising  -EKG unremarkable today  -TSH normal 7/23  -Had duplex at [facility] very recently per 9/25/23 and cannot see results but he reports negative  -Echo ordered to evaluate for HF.  -BMP ordered along with PTT and INR  -Referred to wound care to establish care and given swelling and skin abrasions on LE  -Elevate legs while seated and at night, try compression stockings  -F/u Cardiology  -F/u me as previously scheduled, sooner PRN.

## 2023-10-18 ENCOUNTER — OFFICE VISIT (OUTPATIENT)
Dept: WOUND CARE | Facility: CLINIC | Age: 78
End: 2023-10-18
Payer: MEDICARE

## 2023-10-18 PROCEDURE — 29581 APPL MULTLAYER CMPRN SYS LEG: CPT

## 2023-10-18 PROCEDURE — 99213 OFFICE O/P EST LOW 20 MIN: CPT

## 2023-10-20 ENCOUNTER — CLINICAL SUPPORT (OUTPATIENT)
Dept: WOUND CARE | Facility: CLINIC | Age: 78
End: 2023-10-20
Payer: MEDICARE

## 2023-10-20 ENCOUNTER — TELEMEDICINE (OUTPATIENT)
Dept: PHARMACY | Facility: HOSPITAL | Age: 78
End: 2023-10-20
Payer: MEDICARE

## 2023-10-20 DIAGNOSIS — T38.0X5A STEROID-INDUCED HYPERGLYCEMIA: Primary | ICD-10-CM

## 2023-10-20 DIAGNOSIS — R73.9 STEROID-INDUCED HYPERGLYCEMIA: Primary | ICD-10-CM

## 2023-10-20 DIAGNOSIS — R73.9 HYPERGLYCEMIA: ICD-10-CM

## 2023-10-20 PROCEDURE — 29581 APPL MULTLAYER CMPRN SYS LEG: CPT

## 2023-10-20 PROCEDURE — 29581 APPL MULTLAYER CMPRN SYS LEG: CPT | Performed by: PODIATRIST

## 2023-10-20 RX ORDER — PYRIDOSTIGMINE BROMIDE 60 MG/1
90 TABLET ORAL DAILY
COMMUNITY

## 2023-10-20 RX ORDER — PREDNISONE 20 MG/1
20 TABLET ORAL DAILY
COMMUNITY

## 2023-10-20 NOTE — PROGRESS NOTES
"Patient is sent at the request of Madi Sloan DO for my opinion regarding steroid-induced hyperglycemia.  My final recommendations will be communicated back to the requesting provider by way of shared medical record.    Subjective     Allergies   Allergen Reactions    Sulfa (Sulfonamide Antibiotics) Unknown and Rash     Current monitoring regimen:   Patient is using: glucometer     Not currently testing regularly, notes that his last glucose test at home was a fasting reading at approximately 113 mg/dL     A BMP was completed 10/15/2023 with a glucose reading of 376 mg/dL - He notes that this was not a fasting reading and was completed mid day     Any episodes of hypoglycemia? no    MEDICATION RECONCILIATION  Reviewed:   Allopurinol 300 mg: Take one tablet by mouth once daily (#90 for 90 ds on 10/11/2023)  Amlodipine 10 mg #90 9/18  Dapsone 100 mg  daily on 9/26/2023  Mycophenolate 500 mg #360   Prednisone 20 mg #270 for 90 ds on 10/7/2023  Loartan 100 mg #90 dispensed on 6/26/2023   Myrbetriq 50 mg #90 dispensed on 12/8/2022  Pyridostigmine 60 mg #135 for 90 ds dispensed 8/22/2023   Not on Medication Dispense Report:   Rosuvastatin 20 mg  Dexlansoprazole 60 mg     Objective     There were no vitals taken for this visit.    Steroid Therapy:   - Prednisone 20 mg: Take three tablets by mouth once daily     Lab Review  Lab Results   Component Value Date    BILITOT 0.5 12/08/2021    CALCIUM 9.0 10/13/2023    CO2 22 10/13/2023     10/13/2023    CREATININE 1.36 (H) 10/13/2023    GLUCOSE 376 (H) 10/13/2023    ALKPHOS 89 12/08/2021    K 4.3 10/13/2023    PROT 7.1 12/08/2021     10/13/2023    AST 21 12/08/2021    ALT 21 12/08/2021    BUN 28 (H) 10/13/2023    ANIONGAP 18 10/13/2023    ALBUMIN 4.3 12/08/2021     No results found for: \"TRIG\", \"CHOL\", \"LDLCALC\", \"HDL\"  Lab Results   Component Value Date    HGBA1C 6.3 (H) 07/23/2023    HGBA1C 6.8 (H) 05/25/2023    HGBA1C 6.2 (H) 05/18/2022     The ASCVD Risk score " (Raj WEINBERG, et al., 2019) failed to calculate for the following reasons:    The patient has a prior MI or stroke diagnosis    Drug Interactions:  No drug-drug interactions that require change in therapy    Assessment/Plan   Problem List Items Addressed This Visit       Hyperglycemia    Steroid-induced hyperglycemia - Primary     Pharmacotherapy:  Today we discussed the initiation of a basal-insulin for the treatment of his steroid induced hyperglycemia   Alternatively based on his glucose readings from home I may consider the addition of an oral treatment   Advised to test glucose TID until our follow up call on Tuesday   Toujeo Solostar or Toujeo Max Solostar preferred basal agents covered at $30 for 30 ds   Compliance at present is estimated to be good.   Follow-up: I recommend diabetes care be  10/24/2023 at 9 am .    Lior Bowser, PharmD    Continue all meds under the continuation of care with the referring provider and clinical pharmacy team.

## 2023-10-23 ENCOUNTER — CLINICAL SUPPORT (OUTPATIENT)
Dept: WOUND CARE | Facility: CLINIC | Age: 78
End: 2023-10-23
Payer: MEDICARE

## 2023-10-23 PROCEDURE — 29581 APPL MULTLAYER CMPRN SYS LEG: CPT

## 2023-10-24 ENCOUNTER — PATIENT OUTREACH (OUTPATIENT)
Dept: CARE COORDINATION | Facility: CLINIC | Age: 78
End: 2023-10-24

## 2023-10-24 ENCOUNTER — TELEMEDICINE (OUTPATIENT)
Dept: PHARMACY | Facility: HOSPITAL | Age: 78
End: 2023-10-24
Payer: MEDICARE

## 2023-10-24 DIAGNOSIS — R73.9 STEROID-INDUCED HYPERGLYCEMIA: ICD-10-CM

## 2023-10-24 DIAGNOSIS — T38.0X5A STEROID-INDUCED HYPERGLYCEMIA: ICD-10-CM

## 2023-10-24 RX ORDER — BLOOD SUGAR DIAGNOSTIC
STRIP MISCELLANEOUS
Qty: 100 EACH | Refills: 3 | Status: SHIPPED | OUTPATIENT
Start: 2023-10-24 | End: 2023-11-20 | Stop reason: SDUPTHER

## 2023-10-24 RX ORDER — INSULIN GLARGINE 300 [IU]/ML
INJECTION, SOLUTION SUBCUTANEOUS
Qty: 4.5 ML | Refills: 1 | Status: SHIPPED | OUTPATIENT
Start: 2023-10-24 | End: 2023-11-20 | Stop reason: SDUPTHER

## 2023-10-24 NOTE — PROGRESS NOTES
"Patient is sent at the request of Madi Sloan DO for my opinion regarding steroid-induced hyperglycemia.  My final recommendations will be communicated back to the requesting provider by way of shared medical record.    Subjective     Allergies   Allergen Reactions    Sulfa (Sulfonamide Antibiotics) Unknown and Rash     Current monitoring regimen:   Patient is using: glucometer     DATE FBG PRELUNCH BEDTIME   21-Oct 113 288 319   22-Oct 105 184 247   23-Oct 187 270 290   24-Oct 102     Average 126.75 [phone]     Any episodes of hypoglycemia? no    MEDICATION RECONCILIATION  No changes to medication regimen on 10/25/2023 - Reconciled at previous visit     Objective     There were no vitals taken for this visit.    Steroid Therapy:   - Prednisone 20 mg: Take three tablets by mouth once daily (60 mg total dose taken at 7 am)    Lab Review  Lab Results   Component Value Date    BILITOT 0.5 12/08/2021    CALCIUM 9.0 10/13/2023    CO2 22 10/13/2023     10/13/2023    CREATININE 1.36 (H) 10/13/2023    GLUCOSE 376 (H) 10/13/2023    ALKPHOS 89 12/08/2021    K 4.3 10/13/2023    PROT 7.1 12/08/2021     10/13/2023    AST 21 12/08/2021    ALT 21 12/08/2021    BUN 28 (H) 10/13/2023    ANIONGAP 18 10/13/2023    ALBUMIN 4.3 12/08/2021     No results found for: \"TRIG\", \"CHOL\", \"LDLCALC\", \"HDL\"    Lab Results   Component Value Date    HGBA1C 6.3 (H) 07/23/2023    HGBA1C 6.8 (H) 05/25/2023    HGBA1C 6.2 (H) 05/18/2022     The ASCVD Risk score (Raj DK, et al., 2019) failed to calculate for the following reasons:    The patient has a prior MI or stroke diagnosis    Drug Interactions:  No drug-drug interactions that require change in therapy    Assessment/Plan   Problem List Items Addressed This Visit       Steroid-induced hyperglycemia    Relevant Medications    pen needle, diabetic (BD Ultra-Fine Micro Pen Needle) 32 gauge x 1/4\" needle    insulin glargine (Toujeo Solostar, 1 unit dial,) 300 unit/mL (1.5 " mL) injection    Other Relevant Orders    Follow Up In [facility] Pharmacy     Pharmacotherapy:  Toujeo Solostar 100 units/mL: Inject 10 units under the skin once daily in the morning with breakfast   Consideration for daily dose of 8 units daily based on most recent body weight was reviewed, however I believe therapy at 10 units daily with a likely higher dose will be necessary.  Patient is on therapy with an intermediate acting steroid - Prednisone 60 mg TDD once daily   Patient is not on baseline pharmacotherapy for diabetes mellitus or prediabetes. Patient does not note a history of anitdiabetic agents.   Pen needles sent in to use with Toujeo Solostar 1 unit dial   Advised patient to contact me if he has questions on completion of daily injections. Advised to complete injections into abdomen daily and rotate injection site as directed.  Recommended to test glucose QID - FBG, Pre-Lunch, Pre-Dinner, and Bedtime  Consideration for CGM would allow for more complete data to review if nocturnal or morning hypoglycemia occurs.   Compliance at present is estimated to be good.   Follow-up: I recommend diabetes care be  10/24/2023 .    Lior Bowser, PharmD    Continue all meds under the continuation of care with the referring provider and clinical pharmacy team.

## 2023-10-25 ENCOUNTER — OFFICE VISIT (OUTPATIENT)
Dept: WOUND CARE | Facility: CLINIC | Age: 78
End: 2023-10-25
Payer: MEDICARE

## 2023-10-25 DIAGNOSIS — R22.43 LOCALIZED SWELLING OF BOTH LOWER LEGS: ICD-10-CM

## 2023-10-25 DIAGNOSIS — T14.8XXA SKIN ABRASION: ICD-10-CM

## 2023-10-25 PROCEDURE — 11042 DBRDMT SUBQ TIS 1ST 20SQCM/<: CPT

## 2023-10-25 PROCEDURE — 11045 DBRDMT SUBQ TISS EACH ADDL: CPT

## 2023-10-26 ENCOUNTER — TELEMEDICINE (OUTPATIENT)
Dept: PHARMACY | Facility: HOSPITAL | Age: 78
End: 2023-10-26
Payer: MEDICARE

## 2023-10-26 DIAGNOSIS — T38.0X5A STEROID-INDUCED HYPERGLYCEMIA: ICD-10-CM

## 2023-10-26 DIAGNOSIS — R73.9 STEROID-INDUCED HYPERGLYCEMIA: ICD-10-CM

## 2023-10-26 NOTE — PROGRESS NOTES
"Patient is sent at the request of Madi Sloan DO for my opinion regarding steroid-induced hyperglycemia.  My final recommendations will be communicated back to the requesting provider by way of shared medical record.    Subjective     Allergies   Allergen Reactions    Sulfa (Sulfonamide Antibiotics) Unknown and Rash     Current monitoring regimen:   Patient is using: glucometer     Date FBG Lunch Dinner Bedtime   10/25/2023 133 183 271 325   10/26/2023 174 266       Fasting Glucose Average: 154 mg/dL  Lunch Glucose Average: 225 mg/dL     DATE FBG PRELUNCH BEDTIME   21-Oct 113 288 319   22-Oct 105 184 247   23-Oct 187 270 290   24-Oct 102     Average 127 247 285     Any episodes of hypoglycemia? no    MEDICATION RECONCILIATION  No changes to medication regimen on 10/25/2023 - Reconciled at previous visit     Objective     There were no vitals taken for this visit.    Diabetes Pharmacotherapy:   Toujeo Solostar 100 units/mL: Inject 10 units under the skin once daily in the morning (Therapy initiated     Steroid Therapy:   - Prednisone 20 mg: Take three tablets by mouth once daily (60 mg total dose taken at 7 am)    Lab Review  Lab Results   Component Value Date    BILITOT 0.5 12/08/2021    CALCIUM 9.0 10/13/2023    CO2 22 10/13/2023     10/13/2023    CREATININE 1.36 (H) 10/13/2023    GLUCOSE 376 (H) 10/13/2023    ALKPHOS 89 12/08/2021    K 4.3 10/13/2023    PROT 7.1 12/08/2021     10/13/2023    AST 21 12/08/2021    ALT 21 12/08/2021    BUN 28 (H) 10/13/2023    ANIONGAP 18 10/13/2023    ALBUMIN 4.3 12/08/2021     No results found for: \"TRIG\", \"CHOL\", \"LDLCALC\", \"HDL\"    Lab Results   Component Value Date    HGBA1C 6.3 (H) 07/23/2023    HGBA1C 6.8 (H) 05/25/2023    HGBA1C 6.2 (H) 05/18/2022     The ASCVD Risk score (Raj WEINBERG, et al., 2019) failed to calculate for the following reasons:    The patient has a prior MI or stroke diagnosis    Drug Interactions:  No drug-drug interactions that require change " in therapy    Assessment/Plan   Problem List Items Addressed This Visit    None    Pharmacotherapy:  Toujeo Solostar 100 units/mL: Inject 14 units under the skin once daily in the morning with breakfast (Increased from 10 units)  Advised patient to increase 2 units every two days until our follow up on 10/31/2023.   Patient is on therapy with an intermediate acting steroid - Prednisone 60 mg TDD once daily   Patient is not on baseline pharmacotherapy for diabetes mellitus or prediabetes. Patient does not note a history of anitdiabetic agents.   Advised patient to contact me if he has questions on completion of daily injections. Advised to complete injections into abdomen daily and rotate injection site as directed.  Recommended to test glucose QID - FBG, Pre-Lunch, Pre-Dinner, and Bedtime  Consideration for CGM would allow for more complete data to review if nocturnal or morning hypoglycemia occurs.   Compliance at present is estimated to be good.   Follow-up: I recommend diabetes care be  10/31/2023 .    Lior Bowser, PharmD    Continue all meds under the continuation of care with the referring provider and clinical pharmacy team.

## 2023-10-27 ENCOUNTER — APPOINTMENT (OUTPATIENT)
Dept: WOUND CARE | Facility: CLINIC | Age: 78
End: 2023-10-27
Payer: MEDICARE

## 2023-10-31 ENCOUNTER — TELEMEDICINE (OUTPATIENT)
Dept: PHARMACY | Facility: HOSPITAL | Age: 78
End: 2023-10-31
Payer: MEDICARE

## 2023-10-31 DIAGNOSIS — R73.9 STEROID-INDUCED HYPERGLYCEMIA: ICD-10-CM

## 2023-10-31 DIAGNOSIS — T38.0X5A STEROID-INDUCED HYPERGLYCEMIA: ICD-10-CM

## 2023-10-31 RX ORDER — METFORMIN HYDROCHLORIDE 500 MG/1
TABLET, EXTENDED RELEASE ORAL
Qty: 30 TABLET | Refills: 11 | Status: SHIPPED | OUTPATIENT
Start: 2023-10-31 | End: 2024-02-19 | Stop reason: SDUPTHER

## 2023-10-31 NOTE — PROGRESS NOTES
"Patient is sent at the request of Madi Sloan DO for my opinion regarding steroid-induced hyperglycemia.  My final recommendations will be communicated back to the requesting provider by way of shared medical record.    Subjective     Allergies   Allergen Reactions    Sulfa (Sulfonamide Antibiotics) Unknown and Rash     Current monitoring regimen:   Patient is using: glucometer     Date FBG Lunch Dinner Bedtime   10/25/2023 133 183 271 325   10/26/2023 174 266     10/27/2023 122 249 216 163   10/28/2023 93 270 403 276   10/29/2023 95 459 390 164   10/30/2023 121 362 332 138   10/31/2023 124        Fasting Glucose Average: 154 mg/dL  Lunch Glucose Average: 225 mg/dL   Dinner Glucose Average: 322 mg/dL   Bedtime Glucose Average: 213 mg/dL     Any episodes of hypoglycemia? no    Objective     There were no vitals taken for this visit.    Diabetes Pharmacotherapy:   Toujeo Solostar 100 units/mL: Inject 16 units under the skin once daily in the morning     Steroid Therapy:   - Prednisone 20 mg: Take three tablets by mouth once daily (60 mg total dose taken at 7 am)    Lab Review  Lab Results   Component Value Date    BILITOT 0.5 12/08/2021    CALCIUM 9.0 10/13/2023    CO2 22 10/13/2023     10/13/2023    CREATININE 1.36 (H) 10/13/2023    GLUCOSE 376 (H) 10/13/2023    ALKPHOS 89 12/08/2021    K 4.3 10/13/2023    PROT 7.1 12/08/2021     10/13/2023    AST 21 12/08/2021    ALT 21 12/08/2021    BUN 28 (H) 10/13/2023    ANIONGAP 18 10/13/2023    ALBUMIN 4.3 12/08/2021     No results found for: \"TRIG\", \"CHOL\", \"LDLCALC\", \"HDL\"    Lab Results   Component Value Date    HGBA1C 6.3 (H) 07/23/2023    HGBA1C 6.8 (H) 05/25/2023    HGBA1C 6.2 (H) 05/18/2022     The ASCVD Risk score (Raj DK, et al., 2019) failed to calculate for the following reasons:    The patient has a prior MI or stroke diagnosis    Drug Interactions:  No drug-drug interactions that require change in therapy    Assessment/Plan   Problem List Items " Addressed This Visit       Steroid-induced hyperglycemia     Pharmacotherapy:  Toujeo Solostar 100 units/mL: Inject 20 units under the skin once daily in the morning with breakfast (Increased from 16 units daily)  Metformin  mg: Take one tablet by mouth twice daily  Metformin properly dosed for current renal function  Patient is on therapy with an intermediate acting steroid - Prednisone 60 mg TDD once daily   Patient is not on baseline pharmacotherapy for diabetes mellitus or prediabetes. Patient does not note a history of anitdiabetic agents.   Recommended to continue to test glucose QID - FBG, Pre-Lunch, Pre-Dinner, and Bedtime  Consideration for CGM would allow for more complete data to review if nocturnal or morning hypoglycemia occurs.   Compliance at present is estimated to be good.   Follow-up: I recommend diabetes care be  11/2/2023 .    Lior Bowser, PharmD    Continue all meds under the continuation of care with the referring provider and clinical pharmacy team.

## 2023-11-01 ENCOUNTER — OFFICE VISIT (OUTPATIENT)
Dept: WOUND CARE | Facility: CLINIC | Age: 78
End: 2023-11-01
Payer: MEDICARE

## 2023-11-01 PROCEDURE — 11042 DBRDMT SUBQ TIS 1ST 20SQCM/<: CPT

## 2023-11-01 PROCEDURE — 11045 DBRDMT SUBQ TISS EACH ADDL: CPT

## 2023-11-03 ENCOUNTER — TELEMEDICINE (OUTPATIENT)
Dept: PHARMACY | Facility: HOSPITAL | Age: 78
End: 2023-11-03
Payer: MEDICARE

## 2023-11-03 DIAGNOSIS — T38.0X5A STEROID-INDUCED HYPERGLYCEMIA: ICD-10-CM

## 2023-11-03 DIAGNOSIS — R73.9 STEROID-INDUCED HYPERGLYCEMIA: ICD-10-CM

## 2023-11-03 NOTE — PROGRESS NOTES
"Patient is sent at the request of Madi Sloan DO for my opinion regarding steroid-induced hyperglycemia.  My final recommendations will be communicated back to the requesting provider by way of shared medical record.    Subjective     Allergies   Allergen Reactions    Sulfa (Sulfonamide Antibiotics) Unknown and Rash     Current monitoring regimen:   Patient is using: glucometer     Date FBG Lunch Dinner Bedtime   10/31/2023 124 441 515 121   11/1/2023 112 251 403 141   11/2/2023 352 304 385 172   11/3/2023 96 302       Fasting Glucose Average: 171 mg/dL  Lunch Glucose Average: 325 mg/dL   Dinner Glucose Average: 426 mg/dL   Bedtime Glucose Average: 163 mg/dL     Any episodes of hypoglycemia? no    Objective     There were no vitals taken for this visit.    Diabetes Pharmacotherapy:   Toujeo Solostar 100 units/mL: Inject 20 units under the skin once daily in the morning   Metformin  mg: Take one tablet by mouth twice daily    Steroid Therapy:   - Prednisone 20 mg: Take three tablets by mouth once daily (60 mg total dose taken at 7 am)    Lab Review  Lab Results   Component Value Date    BILITOT 0.5 12/08/2021    CALCIUM 9.0 10/13/2023    CO2 22 10/13/2023     10/13/2023    CREATININE 1.36 (H) 10/13/2023    GLUCOSE 376 (H) 10/13/2023    ALKPHOS 89 12/08/2021    K 4.3 10/13/2023    PROT 7.1 12/08/2021     10/13/2023    AST 21 12/08/2021    ALT 21 12/08/2021    BUN 28 (H) 10/13/2023    ANIONGAP 18 10/13/2023    ALBUMIN 4.3 12/08/2021     No results found for: \"TRIG\", \"CHOL\", \"LDLCALC\", \"HDL\"    Lab Results   Component Value Date    HGBA1C 6.3 (H) 07/23/2023    HGBA1C 6.8 (H) 05/25/2023    HGBA1C 6.2 (H) 05/18/2022     The ASCVD Risk score (Raj DK, et al., 2019) failed to calculate for the following reasons:    The patient has a prior MI or stroke diagnosis    Drug Interactions:  No drug-drug interactions that require change in therapy    Assessment/Plan   Problem List Items Addressed This Visit  "      Steroid-induced hyperglycemia    Relevant Orders    Follow Up In [facility] - Pharmacy       Pharmacotherapy:  Toujeo Solostar 100 units/mL: Inject 26 units under the skin once daily in the morning with breakfast (Increased from 20 units daily)  Metformin  mg: Take one tablet by mouth twice daily  Metformin properly dosed for current renal function  Patient is on therapy with an intermediate acting steroid - Prednisone 60 mg TDD once daily   Patient is not on baseline pharmacotherapy for diabetes mellitus or prediabetes. Patient does not note a history of anitdiabetic agents.   Recommended to continue to test glucose QID - FBG, Pre-Lunch, Pre-Dinner, and Bedtime  Consideration for CGM would allow for more complete data to review if nocturnal or morning hypoglycemia occurs.   Compliance at present is estimated to be good.   Follow-up: I recommend diabetes care be  11/6/2023 .    Lior Bowser, PharmD    Continue all meds under the continuation of care with the referring provider and clinical pharmacy team.

## 2023-11-06 ENCOUNTER — TELEMEDICINE (OUTPATIENT)
Dept: PHARMACY | Facility: HOSPITAL | Age: 78
End: 2023-11-06
Payer: MEDICARE

## 2023-11-06 DIAGNOSIS — R73.9 STEROID-INDUCED HYPERGLYCEMIA: ICD-10-CM

## 2023-11-06 DIAGNOSIS — T38.0X5A STEROID-INDUCED HYPERGLYCEMIA: ICD-10-CM

## 2023-11-06 RX ORDER — INSULIN LISPRO 100 [IU]/ML
INJECTION, SOLUTION INTRAVENOUS; SUBCUTANEOUS
Qty: 15 ML | Refills: 1 | Status: SHIPPED | OUTPATIENT
Start: 2023-11-06 | End: 2023-11-07 | Stop reason: SDUPTHER

## 2023-11-06 NOTE — PROGRESS NOTES
"Patient is sent at the request of Madi Sloan DO for my opinion regarding steroid-induced hyperglycemia.  My final recommendations will be communicated back to the requesting provider by way of shared medical record.    Subjective     Allergies   Allergen Reactions    Sulfa (Sulfonamide Antibiotics) Unknown and Rash     Current monitoring regimen:   Patient is using: glucometer     Date FBG Lunch Dinner Bedtime   10/31/2023 124 441 515 121   11/1/2023 112 251 403 141   11/2/2023 352 304 385 172   11/3/2023 96 302 402 217   11/4/2023 73 284 304 236   11/5/2023 73 171 220 166   11/6/2023 75      Average 129 292 371 175     Fasting Glucose Average: 129 mg/dL  Lunch Glucose Average: 292 mg/dL   Dinner Glucose Average: 371 mg/dL   Bedtime Glucose Average: 176 mg/dL     Any episodes of hypoglycemia? no    Objective     There were no vitals taken for this visit.    Diabetes Pharmacotherapy:   Toujeo Solostar 100 units/mL: Inject 26 units under the skin once daily in the morning   Metformin  mg: Take one tablet by mouth twice daily    Steroid Therapy:   - Prednisone 20 mg: Take three tablets by mouth once daily (60 mg total dose taken at 7 am)    Lab Review  Lab Results   Component Value Date    BILITOT 0.5 12/08/2021    CALCIUM 9.0 10/13/2023    CO2 22 10/13/2023     10/13/2023    CREATININE 1.36 (H) 10/13/2023    GLUCOSE 376 (H) 10/13/2023    ALKPHOS 89 12/08/2021    K 4.3 10/13/2023    PROT 7.1 12/08/2021     10/13/2023    AST 21 12/08/2021    ALT 21 12/08/2021    BUN 28 (H) 10/13/2023    ANIONGAP 18 10/13/2023    ALBUMIN 4.3 12/08/2021     No results found for: \"TRIG\", \"CHOL\", \"LDLCALC\", \"HDL\"    Lab Results   Component Value Date    HGBA1C 6.3 (H) 07/23/2023    HGBA1C 6.8 (H) 05/25/2023    HGBA1C 6.2 (H) 05/18/2022     The ASCVD Risk score (Raj DK, et al., 2019) failed to calculate for the following reasons:    The patient has a prior MI or stroke diagnosis    Drug Interactions:  No drug-drug " interactions that require change in therapy    Assessment/Plan   Problem List Items Addressed This Visit       Steroid-induced hyperglycemia    Relevant Medications    insulin lispro (HumaLOG KwikPen Insulin) 100 unit/mL injection    Other Relevant Orders    Follow Up In [facility] - Pharmacy     Pharmacotherapy:  Initiate:   Humalog Kwikpen 100 units/mL: Inject as directed per sliding scale  <150 mg/dL - 0 units  151-200 mg/dL - 2 units  201-250 mg/dL - 4 units  251-300 mg/dL - 6 units  301-350 mg/dL - 8 units  351-400 mg/dL - 10 units  >400 mg/dL - 12 units, call MD Scar Pak 100 units/mL: Inject 22 units under the skin once daily in the morning with breakfast   Decreased from 26 units daily   Metformin  mg: Take one tablet by mouth twice daily  Metformin properly dosed for current renal function  Patient is on therapy with an intermediate acting steroid - Prednisone 60 mg TDD once daily   Patient is not on baseline pharmacotherapy for diabetes mellitus or prediabetes. Patient does not note a history of anitdiabetic agents.   Recommended to continue to test glucose QID - FBG, Pre-Lunch, Pre-Dinner, and Bedtime  Consideration for CGM would allow for more complete data to review if nocturnal or morning hypoglycemia occurs.   Patient advised to call with s/sx hypoglycemia or glucose <70 mg/dL. Additionally advised to call with glucose >400 mg/dL.  Compliance at present is estimated to be good.   Follow-up: I recommend diabetes care be  11/9/2023 .    Lior Bowser, PharmD    Continue all meds under the continuation of care with the referring provider and clinical pharmacy team.

## 2023-11-07 DIAGNOSIS — T38.0X5A STEROID-INDUCED HYPERGLYCEMIA: ICD-10-CM

## 2023-11-07 DIAGNOSIS — R73.9 STEROID-INDUCED HYPERGLYCEMIA: ICD-10-CM

## 2023-11-07 RX ORDER — INSULIN LISPRO 100 [IU]/ML
INJECTION, SOLUTION INTRAVENOUS; SUBCUTANEOUS
Qty: 15 ML | Refills: 1 | Status: SHIPPED | OUTPATIENT
Start: 2023-11-07 | End: 2024-01-15 | Stop reason: SDUPTHER

## 2023-11-08 ENCOUNTER — OFFICE VISIT (OUTPATIENT)
Dept: WOUND CARE | Facility: CLINIC | Age: 78
End: 2023-11-08
Payer: MEDICARE

## 2023-11-08 PROCEDURE — 11045 DBRDMT SUBQ TISS EACH ADDL: CPT

## 2023-11-08 PROCEDURE — 11721 DEBRIDE NAIL 6 OR MORE: CPT

## 2023-11-08 PROCEDURE — 11042 DBRDMT SUBQ TIS 1ST 20SQCM/<: CPT

## 2023-11-09 ENCOUNTER — TELEMEDICINE (OUTPATIENT)
Dept: PHARMACY | Facility: HOSPITAL | Age: 78
End: 2023-11-09
Payer: MEDICARE

## 2023-11-09 DIAGNOSIS — T38.0X5A STEROID-INDUCED HYPERGLYCEMIA: Primary | ICD-10-CM

## 2023-11-09 DIAGNOSIS — R73.9 STEROID-INDUCED HYPERGLYCEMIA: Primary | ICD-10-CM

## 2023-11-09 NOTE — PROGRESS NOTES
Patient is sent at the request of Madi Sloan DO for my opinion regarding steroid-induced hyperglycemia.  My final recommendations will be communicated back to the requesting provider by way of shared medical record.    Subjective     Allergies   Allergen Reactions    Sulfa (Sulfonamide Antibiotics) Unknown and Rash     Current monitoring regimen:   Patient is using: glucometer     Date FBG Lunch Dinner Bedtime   10/31/2023 124 441 515 121   11/1/2023 112 251 403 141   11/2/2023 352 304 385 172   11/3/2023 96 302 402 217   11/4/2023 73 284 304 236   11/5/2023 73 171 220 166   11/6/2023 75      Average 129 292 371 175     Date FBG Lunch Dinner Bedtime   11/6/2023 75 224 345 130   11/7/2023 82 389 231 150   11/8/2023 86 294 240 203   11/9/2023 84      Average 82 302 272 161     Fasting Glucose Average: 82 mg/dL  Lunch Glucose Average: 302 mg/dL   Dinner Glucose Average: 272 mg/dL   Bedtime Glucose Average: 161 mg/dL     Any episodes of hypoglycemia? no    Objective     There were no vitals taken for this visit.    Diabetes Pharmacotherapy:   Toujeo Solostar 100 units/mL: Inject 22 units under the skin once daily in the morning   Metformin  mg: Take one tablet by mouth twice daily  Humalog Kwikpen 100 units/mL: Inject as directed per sliding scale  <150 mg/dL - 0 units  151-200 mg/dL - 2 units  201-250 mg/dL - 4 units  251-300 mg/dL - 6 units  301-350 mg/dL - 8 units  351-400 mg/dL - 10 units    Steroid Therapy:   - Prednisone 20 mg: Take three tablets by mouth once daily (60 mg total dose taken at 7 am)    Lab Review  Lab Results   Component Value Date    BILITOT 0.5 12/08/2021    CALCIUM 9.0 10/13/2023    CO2 22 10/13/2023     10/13/2023    CREATININE 1.36 (H) 10/13/2023    GLUCOSE 376 (H) 10/13/2023    ALKPHOS 89 12/08/2021    K 4.3 10/13/2023    PROT 7.1 12/08/2021     10/13/2023    AST 21 12/08/2021    ALT 21 12/08/2021    BUN 28 (H) 10/13/2023    ANIONGAP 18 10/13/2023    ALBUMIN 4.3  "12/08/2021     No results found for: \"TRIG\", \"CHOL\", \"LDLCALC\", \"HDL\"    Lab Results   Component Value Date    HGBA1C 6.3 (H) 07/23/2023    HGBA1C 6.8 (H) 05/25/2023    HGBA1C 6.2 (H) 05/18/2022     The ASCVD Risk score (Raj WEINBERG, et al., 2019) failed to calculate for the following reasons:    The patient has a prior MI or stroke diagnosis    Drug Interactions:  No drug-drug interactions that require change in therapy    Assessment/Plan   Problem List Items Addressed This Visit       Steroid-induced hyperglycemia - Primary     Pharmacotherapy:  Initiate: Advised to start injecting 4 units TID and continue with sliding scale insulin.   Humalog Kwikpen 100 units/mL: Inject as 4 units TID plus sliding scale  <150 mg/dL - 0 units  151-200 mg/dL - 2 units  201-250 mg/dL - 4 units  251-300 mg/dL - 6 units  301-350 mg/dL - 8 units  351-400 mg/dL - 10 units  >400 mg/dL - 12 units, call MD  Continue:  Toujeo Solostar 100 units/mL: Inject 22 units under the skin once daily in the morning with breakfast   Metformin  mg: Take one tablet by mouth twice daily  Metformin properly dosed for current renal function  Patient is on therapy with an intermediate acting steroid - Prednisone 60 mg TDD once daily   Patient is not on baseline pharmacotherapy for diabetes mellitus or prediabetes. Patient does not note a history of anitdiabetic agents.   Recommended to continue to test glucose QID - FBG, Pre-Lunch, Pre-Dinner, and Bedtime  Consideration for CGM would allow for more complete data to review if nocturnal or morning hypoglycemia occurs.   Patient advised to call with s/sx hypoglycemia or glucose <70 mg/dL. Additionally advised to call with glucose >400 mg/dL.  Compliance at present is estimated to be good.   Follow-up: I recommend diabetes care be  11/13/2023 .    Lior Bowser, PharmD    Continue all meds under the continuation of care with the referring provider and clinical pharmacy team.  "

## 2023-11-13 ENCOUNTER — LAB (OUTPATIENT)
Dept: LAB | Facility: LAB | Age: 78
End: 2023-11-13
Payer: MEDICARE

## 2023-11-13 ENCOUNTER — TELEMEDICINE (OUTPATIENT)
Dept: PHARMACY | Facility: HOSPITAL | Age: 78
End: 2023-11-13
Payer: MEDICARE

## 2023-11-13 DIAGNOSIS — R73.9 HYPERGLYCEMIA: ICD-10-CM

## 2023-11-13 DIAGNOSIS — T38.0X5A STEROID-INDUCED HYPERGLYCEMIA: Primary | ICD-10-CM

## 2023-11-13 DIAGNOSIS — R73.9 STEROID-INDUCED HYPERGLYCEMIA: Primary | ICD-10-CM

## 2023-11-13 DIAGNOSIS — R73.9 STEROID-INDUCED HYPERGLYCEMIA: ICD-10-CM

## 2023-11-13 DIAGNOSIS — T38.0X5A STEROID-INDUCED HYPERGLYCEMIA: ICD-10-CM

## 2023-11-13 DIAGNOSIS — A69.20 LYME DISEASE, UNSPECIFIED: Primary | ICD-10-CM

## 2023-11-13 PROCEDURE — 87476 LYME DIS DNA AMP PROBE: CPT

## 2023-11-13 PROCEDURE — 36415 COLL VENOUS BLD VENIPUNCTURE: CPT

## 2023-11-13 NOTE — PROGRESS NOTES
Patient is sent at the request of Madi Sloan DO for my opinion regarding steroid-induced hyperglycemia.  My final recommendations will be communicated back to the requesting provider by way of shared medical record.    Subjective     Allergies   Allergen Reactions    Sulfa (Sulfonamide Antibiotics) Unknown and Rash     Diet:  Breakfast: Cereal with a banana or a waffle with turkey sausage or an english muffin  Lunch: Low fat wrap with deli meat   Dinner: Protein with vegetable and carbohydrate (Last night chicken with beans, broccoli and stuffing)   Snacks: Zero carb jello  Drinks: Diet [place] green tea, cup of coffee, occasional diet pop    Current monitoring regimen:   Patient is using: glucometer     Date FBG Lunch Dinner Bedtime   10/31/2023 124 441 515 121   11/1/2023 112 251 403 141   11/2/2023 352 304 385 172   11/3/2023 96 302 402 217   11/4/2023 73 284 304 236   11/5/2023 73 171 220 166   11/6/2023 75      Average 129 292 371 175     Date FBG Lunch Dinner Bedtime   11/6/2023 75 224 345 130   11/7/2023 82 389 231 150   11/8/2023 86 294 240 203   11/9/2023 84      Average 82 302 272 161     Fasting Glucose Average: 82 mg/dL  Lunch Glucose Average: 302 mg/dL   Dinner Glucose Average: 272 mg/dL   Bedtime Glucose Average: 161 mg/dL     Date FBG Lunch Dinner Bedtime   11/9/2023 84 271 188 262   11/10/2023 70 221 340 114   11/11/2023 60 210 358 109   11/12/2023 57 177 350 268   11/13/2023 66      Average 67 219 309 188     Fasting Glucose Average: 67 mg/dL  Lunch Glucose Average: 219 mg/dL   Dinner Glucose Average: 309 mg/dL   Bedtime Glucose Average: 188 mg/dL     Any episodes of hypoglycemia? no    Objective     There were no vitals taken for this visit.    Diabetes Pharmacotherapy:   Toujeo Solostar 100 units/mL: Inject 22 units under the skin once daily in the morning   Metformin  mg: Take one tablet by mouth twice daily  Humalog Kwikpen 100 units/mL: Inject as directed per sliding scale  <150 mg/dL  "- 0 units  151-200 mg/dL - 2 units  201-250 mg/dL - 4 units  251-300 mg/dL - 6 units  301-350 mg/dL - 8 units  351-400 mg/dL - 10 units    Steroid Therapy:   - Prednisone 20 mg: Take three tablets by mouth once daily (60 mg total dose taken at 7 am)    Lab Review  Lab Results   Component Value Date    BILITOT 0.5 12/08/2021    CALCIUM 9.0 10/13/2023    CO2 22 10/13/2023     10/13/2023    CREATININE 1.36 (H) 10/13/2023    GLUCOSE 376 (H) 10/13/2023    ALKPHOS 89 12/08/2021    K 4.3 10/13/2023    PROT 7.1 12/08/2021     10/13/2023    AST 21 12/08/2021    ALT 21 12/08/2021    BUN 28 (H) 10/13/2023    ANIONGAP 18 10/13/2023    ALBUMIN 4.3 12/08/2021     No results found for: \"TRIG\", \"CHOL\", \"LDLCALC\", \"HDL\"    Lab Results   Component Value Date    HGBA1C 6.3 (H) 07/23/2023    HGBA1C 6.8 (H) 05/25/2023    HGBA1C 6.2 (H) 05/18/2022     The ASCVD Risk score (Raj WEINBERG, et al., 2019) failed to calculate for the following reasons:    The patient has a prior MI or stroke diagnosis    Drug Interactions:  No drug-drug interactions that require change in therapy    Assessment/Plan   Problem List Items Addressed This Visit    None    Pharmacotherapy:  Initiate:   Humalog Kwikpen 100 units/mL: Inject as 6-8-6 units TID plus sliding scale  <150 mg/dL - 0 units  151-200 mg/dL - 2 units  201-250 mg/dL - 4 units  251-300 mg/dL - 6 units  301-350 mg/dL - 8 units  351-400 mg/dL - 10 units  >400 mg/dL - 12 units, call MD  Decrease Dose:  Toujeo Solostar 100 units/mL: Inject 18 units under the skin once daily in the morning with breakfast   Metformin  mg: Take one tablet by mouth twice daily  Metformin properly dosed for current renal function  Patient is on therapy with an intermediate acting steroid - Prednisone 60 mg TDD once daily   Patient was not on baseline pharmacotherapy for diabetes mellitus or prediabetes. Patient does not note a history of anitdiabetic agents.   Recommended to continue to test glucose QID - " FBG, Pre-Lunch, Pre-Dinner, and Bedtime  Consideration for CGM would allow for more complete data to review if nocturnal or morning hypoglycemia occurs.   Patient advised to call with s/sx hypoglycemia or glucose <70 mg/dL. Additionally advised to call with glucose >400 mg/dL.  Compliance at present is estimated to be good.   Follow-up: I recommend diabetes care be  11/16/2023 .    Lior Bowser, PharmD    Continue all meds under the continuation of care with the referring provider and clinical pharmacy team.

## 2023-11-14 ENCOUNTER — HOSPITAL ENCOUNTER (OUTPATIENT)
Dept: VASCULAR MEDICINE | Facility: CLINIC | Age: 78
Discharge: HOME | End: 2023-11-14
Payer: MEDICARE

## 2023-11-14 ENCOUNTER — HOSPITAL ENCOUNTER (OUTPATIENT)
Dept: CARDIOLOGY | Facility: CLINIC | Age: 78
Discharge: HOME | End: 2023-11-14
Payer: MEDICARE

## 2023-11-14 DIAGNOSIS — R60.0 LOCALIZED EDEMA: ICD-10-CM

## 2023-11-14 DIAGNOSIS — M79.18 RIGHT BUTTOCK PAIN: ICD-10-CM

## 2023-11-14 DIAGNOSIS — R53.83 FATIGUE, UNSPECIFIED TYPE: ICD-10-CM

## 2023-11-14 DIAGNOSIS — R22.41 LOCALIZED SWELLING OF RIGHT LOWER EXTREMITY: ICD-10-CM

## 2023-11-14 DIAGNOSIS — S30.0XXA TRAUMATIC HEMATOMA OF BUTTOCK, INITIAL ENCOUNTER: ICD-10-CM

## 2023-11-14 DIAGNOSIS — R22.1 LOCALIZED SWELLING, MASS AND LUMP, NECK: ICD-10-CM

## 2023-11-14 DIAGNOSIS — I25.10 CORONARY ARTERY DISEASE INVOLVING NATIVE CORONARY ARTERY OF NATIVE HEART WITHOUT ANGINA PECTORIS: ICD-10-CM

## 2023-11-14 DIAGNOSIS — R22.43 LOCALIZED SWELLING OF BOTH LOWER LEGS: ICD-10-CM

## 2023-11-14 PROCEDURE — 93306 TTE W/DOPPLER COMPLETE: CPT | Performed by: INTERNAL MEDICINE

## 2023-11-14 PROCEDURE — 93306 TTE W/DOPPLER COMPLETE: CPT

## 2023-11-15 ENCOUNTER — OFFICE VISIT (OUTPATIENT)
Dept: WOUND CARE | Facility: CLINIC | Age: 78
End: 2023-11-15
Payer: MEDICARE

## 2023-11-15 LAB
AORTIC VALVE MEAN GRADIENT: 16.2
AORTIC VALVE PEAK VELOCITY: 2.77
AV PEAK GRADIENT: 30.6
AVA (PEAK VEL): 1.51
AVA (VTI): 1.21
EJECTION FRACTION APICAL 4 CHAMBER: 50
EJECTION FRACTION: 61
LEFT ATRIUM VOLUME AREA LENGTH INDEX BSA: 54.3
LEFT VENTRICLE INTERNAL DIMENSION DIASTOLE: 4.98 (ref 3.5–6)
LEFT VENTRICULAR OUTFLOW TRACT DIAMETER: 1.9
MITRAL VALVE E/A RATIO: 0.88
MITRAL VALVE E/E' RATIO: 43.96
RIGHT VENTRICLE FREE WALL PEAK S': 18
RIGHT VENTRICLE PEAK SYSTOLIC PRESSURE: 32.3
TRICUSPID ANNULAR PLANE SYSTOLIC EXCURSION: 2.2

## 2023-11-15 PROCEDURE — 11042 DBRDMT SUBQ TIS 1ST 20SQCM/<: CPT

## 2023-11-15 PROCEDURE — 11045 DBRDMT SUBQ TISS EACH ADDL: CPT

## 2023-11-16 ENCOUNTER — TELEMEDICINE (OUTPATIENT)
Dept: PHARMACY | Facility: HOSPITAL | Age: 78
End: 2023-11-16
Payer: MEDICARE

## 2023-11-16 DIAGNOSIS — R73.9 STEROID-INDUCED HYPERGLYCEMIA: Primary | ICD-10-CM

## 2023-11-16 DIAGNOSIS — T38.0X5A STEROID-INDUCED HYPERGLYCEMIA: Primary | ICD-10-CM

## 2023-11-16 DIAGNOSIS — R73.9 HYPERGLYCEMIA: ICD-10-CM

## 2023-11-16 NOTE — PROGRESS NOTES
Patient is sent at the request of Madi Sloan DO for my opinion regarding steroid-induced hyperglycemia.  My final recommendations will be communicated back to the requesting provider by way of shared medical record.    Subjective     Allergies   Allergen Reactions    Sulfa (Sulfonamide Antibiotics) Unknown and Rash     Diet:  Breakfast: Cereal with a banana or a waffle with turkey sausage or an english muffin  Lunch: Low fat wrap with deli meat   Dinner: Protein with vegetable and carbohydrate (Last night chicken with beans, broccoli and stuffing)   Snacks: Zero carb jello  Drinks: Diet [place] green tea, cup of coffee, occasional diet pop    Current monitoring regimen:   Patient is using: glucometer     Date FBG Lunch Dinner Bedtime   10/31/2023 124 441 515 121   11/1/2023 112 251 403 141   11/2/2023 352 304 385 172   11/3/2023 96 302 402 217   11/4/2023 73 284 304 236   11/5/2023 73 171 220 166   11/6/2023 75      Average 129 292 371 175     Date FBG Lunch Dinner Bedtime   11/6/2023 75 224 345 130   11/7/2023 82 389 231 150   11/8/2023 86 294 240 203   11/9/2023 84      Average 82 302 272 161     Fasting Glucose Average: 82 mg/dL  Lunch Glucose Average: 302 mg/dL   Dinner Glucose Average: 272 mg/dL   Bedtime Glucose Average: 161 mg/dL     Date FBG Lunch Dinner Bedtime   11/9/2023 84 271 188 262   11/10/2023 70 221 340 114   11/11/2023 60 210 358 109   11/12/2023 57 177 350 268   11/13/2023 66      Average 67 219 309 188     Fasting Glucose Average: 67 mg/dL  Lunch Glucose Average: 219 mg/dL   Dinner Glucose Average: 309 mg/dL   Bedtime Glucose Average: 188 mg/dL     Date FBG Lunch Dinner Bedtime   11/13/2023 66 228 330 141   11/14/2023 62 234 282 133   11/15/2023 67 249 319 141   11/16/2023 84      Average 70 237 310 138     Fasting Glucose Average: 70 mg/dL  Lunch Glucose Average: 237 mg/dL   Dinner Glucose Average: 310 mg/dL   Bedtime Glucose Average: 138 mg/dL     Any episodes of hypoglycemia?  "no    Objective     There were no vitals taken for this visit.    Diabetes Pharmacotherapy:   Toujeo Solostar 100 units/mL: Inject 22 units under the skin once daily in the morning   Metformin  mg: Take one tablet by mouth twice daily  Humalog Kwikpen 100 units/mL: Inject as directed per sliding scale  <150 mg/dL - 0 units  151-200 mg/dL - 2 units  201-250 mg/dL - 4 units  251-300 mg/dL - 6 units  301-350 mg/dL - 8 units  351-400 mg/dL - 10 units    Steroid Therapy:   - Prednisone 20 mg: Take three tablets by mouth once daily (60 mg total dose taken at 7 am)    Lab Review  Lab Results   Component Value Date    BILITOT 0.5 12/08/2021    CALCIUM 9.0 10/13/2023    CO2 22 10/13/2023     10/13/2023    CREATININE 1.36 (H) 10/13/2023    GLUCOSE 376 (H) 10/13/2023    ALKPHOS 89 12/08/2021    K 4.3 10/13/2023    PROT 7.1 12/08/2021     10/13/2023    AST 21 12/08/2021    ALT 21 12/08/2021    BUN 28 (H) 10/13/2023    ANIONGAP 18 10/13/2023    ALBUMIN 4.3 12/08/2021     No results found for: \"TRIG\", \"CHOL\", \"LDLCALC\", \"HDL\"    Lab Results   Component Value Date    HGBA1C 6.3 (H) 07/23/2023    HGBA1C 6.8 (H) 05/25/2023    HGBA1C 6.2 (H) 05/18/2022     The ASCVD Risk score (Raj WEINBERG, et al., 2019) failed to calculate for the following reasons:    The patient has a prior MI or stroke diagnosis    Drug Interactions:  No drug-drug interactions that require change in therapy    Assessment/Plan   Problem List Items Addressed This Visit       Hyperglycemia    Relevant Orders    Follow Up In [facility] - Pharmacy    Steroid-induced hyperglycemia - Primary    Relevant Orders    Follow Up In [facility] - Pharmacy     Pharmacotherapy:  Change:   Humalog Kwikpen 100 units/mL: Inject as 8-12-10 units TID plus sliding scale  <150 mg/dL - 0 units  151-200 mg/dL - 2 units  201-250 mg/dL - 4 units  251-300 mg/dL - 6 units  301-350 mg/dL - 8 units  351-400 mg/dL - 10 units  >400 mg/dL - 12 units, call " MD  Decrease Dose:  Toujeo Solostar 100 units/mL: Inject 16 units under the skin once daily in the morning with breakfast (Decreased from 18 units daily)  Metformin  mg: Take one tablet by mouth twice daily  Metformin properly dosed for current renal function  Patient is on therapy with an intermediate acting steroid - Prednisone 60 mg TDD once daily   Patient was not on baseline pharmacotherapy for diabetes mellitus or prediabetes. Patient does not note a history of anitdiabetic agents.   Recommended to continue to test glucose QID - FBG, Pre-Lunch, Pre-Dinner, and Bedtime  Consideration for CGM would allow for more complete data to review if nocturnal or morning hypoglycemia occurs.   Patient advised to call with s/sx hypoglycemia or glucose <70 mg/dL. Additionally advised to call with glucose >400 mg/dL.  Compliance at present is estimated to be good.   Follow-up: I recommend diabetes care be  11/20/2023 .    Lior Bowser, PharmD    Continue all meds under the continuation of care with the referring provider and clinical pharmacy team.

## 2023-11-19 LAB
B BURGDOR DNA SPEC QL NAA+PROBE: NOT DETECTED
SPECIMEN SOURCE: NORMAL

## 2023-11-20 ENCOUNTER — TELEMEDICINE (OUTPATIENT)
Dept: PHARMACY | Facility: HOSPITAL | Age: 78
End: 2023-11-20
Payer: MEDICARE

## 2023-11-20 DIAGNOSIS — R73.9 HYPERGLYCEMIA: ICD-10-CM

## 2023-11-20 DIAGNOSIS — T38.0X5A STEROID-INDUCED HYPERGLYCEMIA: ICD-10-CM

## 2023-11-20 DIAGNOSIS — R73.9 STEROID-INDUCED HYPERGLYCEMIA: ICD-10-CM

## 2023-11-20 RX ORDER — INSULIN GLARGINE 300 [IU]/ML
INJECTION, SOLUTION SUBCUTANEOUS
Qty: 4.5 ML | Refills: 1 | Status: SHIPPED | OUTPATIENT
Start: 2023-11-20 | End: 2024-04-16 | Stop reason: ALTCHOICE

## 2023-11-20 RX ORDER — BLOOD SUGAR DIAGNOSTIC
STRIP MISCELLANEOUS
Qty: 400 EACH | Refills: 3 | Status: SHIPPED | OUTPATIENT
Start: 2023-11-20

## 2023-11-20 NOTE — PROGRESS NOTES
Patient is sent at the request of Madi Sloan DO for my opinion regarding steroid-induced hyperglycemia.  My final recommendations will be communicated back to the requesting provider by way of shared medical record.    Subjective     Allergies   Allergen Reactions    Sulfa (Sulfonamide Antibiotics) Unknown and Rash     Diet:  Breakfast: Cereal with a banana or a waffle with turkey sausage or an english muffin  Lunch: Low fat wrap with deli meat   Dinner: Protein with vegetable and carbohydrate (Last night chicken with beans, broccoli and stuffing)   Snacks: Zero carb jello  Drinks: Diet [place] green tea, cup of coffee, occasional diet pop    Current monitoring regimen:   Patient is using: glucometer     Date FBG Lunch Dinner Bedtime   10/31/2023 124 441 515 121   11/1/2023 112 251 403 141   11/2/2023 352 304 385 172   11/3/2023 96 302 402 217   11/4/2023 73 284 304 236   11/5/2023 73 171 220 166   11/6/2023 75      Average 129 292 371 175     Date FBG Lunch Dinner Bedtime   11/6/2023 75 224 345 130   11/7/2023 82 389 231 150   11/8/2023 86 294 240 203   11/9/2023 84      Average 82 302 272 161     Fasting Glucose Average: 82 mg/dL  Lunch Glucose Average: 302 mg/dL   Dinner Glucose Average: 272 mg/dL   Bedtime Glucose Average: 161 mg/dL     Date FBG Lunch Dinner Bedtime   11/9/2023 84 271 188 262   11/10/2023 70 221 340 114   11/11/2023 60 210 358 109   11/12/2023 57 177 350 268   11/13/2023 66      Average 67 219 309 188     Fasting Glucose Average: 67 mg/dL  Lunch Glucose Average: 219 mg/dL   Dinner Glucose Average: 309 mg/dL   Bedtime Glucose Average: 188 mg/dL     Date FBG Lunch Dinner Bedtime   11/13/2023 66 228 330 141   11/14/2023 62 234 282 133   11/15/2023 67 249 319 141   11/16/2023 84      Average 70 237 310 138     Fasting Glucose Average: 70 mg/dL  Lunch Glucose Average: 237 mg/dL   Dinner Glucose Average: 310 mg/dL   Bedtime Glucose Average: 138 mg/dL       Date FBG Lunch Dinner Bedtime  "  11/16/2023 84 189 272 104   11/17/2023 68 305 278 137   11/18/2023 89 242 210 143   11/19/2023 91 249 232 195   11/20/2023 140        Fasting Glucose Average: 94 mg/dL  Lunch Glucose Average: 246 mg/dL   Dinner Glucose Average: 248 mg/dL   Bedtime Glucose Average: 145 mg/dL     Objective     There were no vitals taken for this visit.    Diabetes Pharmacotherapy:   Toujeo Solostar 100 units/mL: Inject 16 units under the skin once daily in the morning   Metformin  mg: Take one tablet by mouth twice daily  Humalog Kwikpen 100 units/mL: Inject as directed per sliding scale  <150 mg/dL - 0 units  151-200 mg/dL - 2 units  201-250 mg/dL - 4 units  251-300 mg/dL - 6 units  301-350 mg/dL - 8 units  351-400 mg/dL - 10 units    Steroid Therapy:   - Prednisone 20 mg: Take three tablets by mouth once daily (60 mg total dose taken at 7 am)    Lab Review  Lab Results   Component Value Date    BILITOT 0.5 12/08/2021    CALCIUM 9.0 10/13/2023    CO2 22 10/13/2023     10/13/2023    CREATININE 1.36 (H) 10/13/2023    GLUCOSE 376 (H) 10/13/2023    ALKPHOS 89 12/08/2021    K 4.3 10/13/2023    PROT 7.1 12/08/2021     10/13/2023    AST 21 12/08/2021    ALT 21 12/08/2021    BUN 28 (H) 10/13/2023    ANIONGAP 18 10/13/2023    ALBUMIN 4.3 12/08/2021     No results found for: \"TRIG\", \"CHOL\", \"LDLCALC\", \"HDL\"    Lab Results   Component Value Date    HGBA1C 6.3 (H) 07/23/2023    HGBA1C 6.8 (H) 05/25/2023    HGBA1C 6.2 (H) 05/18/2022     The ASCVD Risk score (Raj DK, et al., 2019) failed to calculate for the following reasons:    The patient has a prior MI or stroke diagnosis    Drug Interactions:  No drug-drug interactions that require change in therapy    Assessment/Plan   Problem List Items Addressed This Visit       Hyperglycemia    Relevant Orders    Follow Up In [facility] - Pharmacy    Steroid-induced hyperglycemia    Relevant Orders    Follow Up In [facility] - Pharmacy "     Pharmacotherapy:  Change:   Humalog Kwikpen 100 units/mL: Inject as 8-12-10 units TID plus sliding scale   <150 mg/dL - 0 units  151-200 mg/dL - 4 units  201-250 mg/dL - 6 units  251-300 mg/dL - 8 units  301-350 mg/dL - 10 units  351-400 mg/dL - 12 units  >400 mg/dL > 14 units, call MD  Continue  Scar Toroostar 100 units/mL: Inject 16 units under the skin once daily in the morning with breakfast   Metformin  mg: Take one tablet by mouth twice daily  Metformin properly dosed for current renal function  Patient is on therapy with an intermediate acting steroid - Prednisone 60 mg TDD once daily   Patient was not on baseline pharmacotherapy for diabetes mellitus or prediabetes. Patient does not note a history of anitdiabetic agents.   Recommended to continue to test glucose QID - FBG, Pre-Lunch, Pre-Dinner, and Bedtime  Consideration for CGM would allow for more complete data to review if nocturnal or morning hypoglycemia occurs.   Patient advised to call with s/sx hypoglycemia or glucose <70 mg/dL. Additionally advised to call with glucose >400 mg/dL.  Compliance at present is estimated to be good.   Follow-up: I recommend diabetes care be  11/27/2023 at 9 am  .    Lior Bowser, PharmD    Continue all meds under the continuation of care with the referring provider and clinical pharmacy team.

## 2023-11-22 ENCOUNTER — TELEPHONE (OUTPATIENT)
Dept: PRIMARY CARE | Facility: CLINIC | Age: 78
End: 2023-11-22

## 2023-11-22 ENCOUNTER — OFFICE VISIT (OUTPATIENT)
Dept: WOUND CARE | Facility: CLINIC | Age: 78
End: 2023-11-22
Payer: MEDICARE

## 2023-11-22 PROCEDURE — 11042 DBRDMT SUBQ TIS 1ST 20SQCM/<: CPT

## 2023-11-27 ENCOUNTER — TELEMEDICINE (OUTPATIENT)
Dept: PHARMACY | Facility: HOSPITAL | Age: 78
End: 2023-11-27
Payer: MEDICARE

## 2023-11-27 DIAGNOSIS — R73.9 STEROID-INDUCED HYPERGLYCEMIA: ICD-10-CM

## 2023-11-27 DIAGNOSIS — T38.0X5A STEROID-INDUCED HYPERGLYCEMIA: ICD-10-CM

## 2023-11-27 DIAGNOSIS — R73.9 HYPERGLYCEMIA: ICD-10-CM

## 2023-11-27 NOTE — PROGRESS NOTES
Patient is sent at the request of Madi Sloan DO for my opinion regarding steroid-induced hyperglycemia.  My final recommendations will be communicated back to the requesting provider by way of shared medical record.    Subjective     Allergies   Allergen Reactions    Sulfa (Sulfonamide Antibiotics) Unknown and Rash     Current monitoring regimen:   Patient is using: glucometer     Date FBG Lunch Dinner Bedtime   10/31/2023 124 441 515 121   11/1/2023 112 251 403 141   11/2/2023 352 304 385 172   11/3/2023 96 302 402 217   11/4/2023 73 284 304 236   11/5/2023 73 171 220 166   11/6/2023 75      Average 129 292 371 175     Date FBG Lunch Dinner Bedtime   11/6/2023 75 224 345 130   11/7/2023 82 389 231 150   11/8/2023 86 294 240 203   11/9/2023 84      Average 82 302 272 161     Fasting Glucose Average: 82 mg/dL  Lunch Glucose Average: 302 mg/dL   Dinner Glucose Average: 272 mg/dL   Bedtime Glucose Average: 161 mg/dL     Date FBG Lunch Dinner Bedtime   11/9/2023 84 271 188 262   11/10/2023 70 221 340 114   11/11/2023 60 210 358 109   11/12/2023 57 177 350 268   11/13/2023 66      Average 67 219 309 188     Fasting Glucose Average: 67 mg/dL  Lunch Glucose Average: 219 mg/dL   Dinner Glucose Average: 309 mg/dL   Bedtime Glucose Average: 188 mg/dL     Date FBG Lunch Dinner Bedtime   11/13/2023 66 228 330 141   11/14/2023 62 234 282 133   11/15/2023 67 249 319 141   11/16/2023 84      Average 70 237 310 138     Fasting Glucose Average: 70 mg/dL  Lunch Glucose Average: 237 mg/dL   Dinner Glucose Average: 310 mg/dL   Bedtime Glucose Average: 138 mg/dL       Date FBG Lunch Dinner Bedtime   11/16/2023 84 189 272 104   11/17/2023 68 305 278 137   11/18/2023 89 242 210 143   11/19/2023 91 249 232 195   11/20/2023 140        Fasting Glucose Average: 94 mg/dL  Lunch Glucose Average: 246 mg/dL   Dinner Glucose Average: 248 mg/dL   Bedtime Glucose Average: 145 mg/dL     Date FBG Lunch Dinner Bedtime   11/20/2023 140 245 273 185  "  11/21/2023 83 288 217 196   11/22/2023 85 244 217 157   11/23/2023 80 380 288 212   11/24/2023 109 235 334 205   11/25/2023 88 233 142 111   11/26/2023 77 250 225 130   11/27/2023 75 255       Fasting Glucose Average: 92 mg/dL  Lunch Glucose Average: 266 mg/dL   Dinner Glucose Average: 242 mg/dL   Bedtime Glucose Average: 170 mg/dL   Objective     There were no vitals taken for this visit.    Diabetes Pharmacotherapy:   Toujeo Solostar 100 units/mL: Inject 16 units under the skin once daily in the morning   Metformin  mg: Take one tablet by mouth twice daily  Humalog Kwikpen 100 units/mL: Inject as directed per sliding scale. 8-12-10 plus SSI.  <150 mg/dL - 0 units  151-200 mg/dL - 4 units  201-250 mg/dL - 6 units  251-300 mg/dL - 8 units  301-350 mg/dL - 10 units  351-400 mg/dL - 12 units    Steroid Therapy:   - Prednisone 20 mg: Take three tablets by mouth once daily (60 mg total dose taken at 7 am)    Lab Review  Lab Results   Component Value Date    BILITOT 0.5 12/08/2021    CALCIUM 9.0 10/13/2023    CO2 22 10/13/2023     10/13/2023    CREATININE 1.36 (H) 10/13/2023    GLUCOSE 376 (H) 10/13/2023    ALKPHOS 89 12/08/2021    K 4.3 10/13/2023    PROT 7.1 12/08/2021     10/13/2023    AST 21 12/08/2021    ALT 21 12/08/2021    BUN 28 (H) 10/13/2023    ANIONGAP 18 10/13/2023    ALBUMIN 4.3 12/08/2021     No results found for: \"TRIG\", \"CHOL\", \"LDLCALC\", \"HDL\"    Lab Results   Component Value Date    HGBA1C 6.3 (H) 07/23/2023    HGBA1C 6.8 (H) 05/25/2023    HGBA1C 6.2 (H) 05/18/2022     The ASCVD Risk score (Raj DK, et al., 2019) failed to calculate for the following reasons:    The patient has a prior MI or stroke diagnosis    Drug Interactions:  No drug-drug interactions that require change in therapy    Assessment/Plan   Problem List Items Addressed This Visit       Hyperglycemia    Steroid-induced hyperglycemia     Pharmacotherapy:  Change:   Humalog Kwikpen 100 units/mL: Inject as 12-16-10 " units TID plus sliding scale - Base scale kept the same for todays encounter.   <150 mg/dL - 0 units  151-200 mg/dL - 4 units  201-250 mg/dL - 6 units  251-300 mg/dL - 8 units  301-350 mg/dL - 10 units  351-400 mg/dL - 12 units  >400 mg/dL > 14 units, call MD  Increase:   Metformin  mg: Take three tablets daily (Increased from 2 tablets daily)  Continue  Toujeo Solostar 100 units/mL: Inject 16 units under the skin once daily in the morning with breakfast   Metformin properly dosed for current renal function  Patient is on therapy with an intermediate acting steroid - Prednisone 60 mg TDD once daily   Patient was not on baseline pharmacotherapy for diabetes mellitus or prediabetes. Patient does not note a history of anitdiabetic agents.   Recommended to continue to test glucose QID - FBG, Pre-Lunch, Pre-Dinner, and Bedtime  Consideration for CGM would allow for more complete data to review if nocturnal or morning hypoglycemia occurs.   Patient advised to call with s/sx hypoglycemia or glucose <70 mg/dL. Additionally advised to call with glucose >400 mg/dL.  Compliance at present is estimated to be good.   Follow-up: I recommend diabetes care be  11/30/2023 at 10:30 am  .    Lior Bowser, PharmD    Continue all meds under the continuation of care with the referring provider and clinical pharmacy team.

## 2023-11-29 ENCOUNTER — OFFICE VISIT (OUTPATIENT)
Dept: WOUND CARE | Facility: CLINIC | Age: 78
End: 2023-11-29
Payer: MEDICARE

## 2023-11-29 PROCEDURE — 11042 DBRDMT SUBQ TIS 1ST 20SQCM/<: CPT

## 2023-11-30 ENCOUNTER — PATIENT OUTREACH (OUTPATIENT)
Dept: CARE COORDINATION | Facility: CLINIC | Age: 78
End: 2023-11-30

## 2023-11-30 ENCOUNTER — TELEMEDICINE (OUTPATIENT)
Dept: PHARMACY | Facility: HOSPITAL | Age: 78
End: 2023-11-30
Payer: MEDICARE

## 2023-11-30 DIAGNOSIS — R73.9 STEROID-INDUCED HYPERGLYCEMIA: ICD-10-CM

## 2023-11-30 DIAGNOSIS — T38.0X5A STEROID-INDUCED HYPERGLYCEMIA: ICD-10-CM

## 2023-11-30 DIAGNOSIS — R73.9 HYPERGLYCEMIA: ICD-10-CM

## 2023-11-30 NOTE — PROGRESS NOTES
Patient is sent at the request of Madi Sloan DO for my opinion regarding steroid-induced hyperglycemia.  My final recommendations will be communicated back to the requesting provider by way of shared medical record.    Subjective     Allergies   Allergen Reactions    Sulfa (Sulfonamide Antibiotics) Unknown and Rash     Current monitoring regimen:   Patient is using: glucometer     Date FBG Lunch Dinner Bedtime   10/31/2023 124 441 515 121   11/1/2023 112 251 403 141   11/2/2023 352 304 385 172   11/3/2023 96 302 402 217   11/4/2023 73 284 304 236   11/5/2023 73 171 220 166   11/6/2023 75      Average 129 292 371 175     Date FBG Lunch Dinner Bedtime   11/6/2023 75 224 345 130   11/7/2023 82 389 231 150   11/8/2023 86 294 240 203   11/9/2023 84      Average 82 302 272 161     Fasting Glucose Average: 82 mg/dL  Lunch Glucose Average: 302 mg/dL   Dinner Glucose Average: 272 mg/dL   Bedtime Glucose Average: 161 mg/dL     Date FBG Lunch Dinner Bedtime   11/9/2023 84 271 188 262   11/10/2023 70 221 340 114   11/11/2023 60 210 358 109   11/12/2023 57 177 350 268   11/13/2023 66      Average 67 219 309 188     Fasting Glucose Average: 67 mg/dL  Lunch Glucose Average: 219 mg/dL   Dinner Glucose Average: 309 mg/dL   Bedtime Glucose Average: 188 mg/dL     Date FBG Lunch Dinner Bedtime   11/13/2023 66 228 330 141   11/14/2023 62 234 282 133   11/15/2023 67 249 319 141   11/16/2023 84      Average 70 237 310 138     Fasting Glucose Average: 70 mg/dL  Lunch Glucose Average: 237 mg/dL   Dinner Glucose Average: 310 mg/dL   Bedtime Glucose Average: 138 mg/dL     Date FBG Lunch Dinner Bedtime   11/16/2023 84 189 272 104   11/17/2023 68 305 278 137   11/18/2023 89 242 210 143   11/19/2023 91 249 232 195   11/20/2023 140        Fasting Glucose Average: 94 mg/dL  Lunch Glucose Average: 246 mg/dL   Dinner Glucose Average: 248 mg/dL   Bedtime Glucose Average: 145 mg/dL     Date FBG Lunch Dinner Bedtime   11/20/2023 140 245 273 185  "  11/21/2023 83 288 217 196   11/22/2023 85 244 217 157   11/23/2023 80 380 288 212   11/24/2023 109 235 334 205   11/25/2023 88 233 142 111   11/26/2023 77 250 225 130   11/27/2023 75 255       Fasting Glucose Average: 92 mg/dL  Lunch Glucose Average: 266 mg/dL   Dinner Glucose Average: 242 mg/dL   Bedtime Glucose Average: 170 mg/dL     Date FBG Lunch Dinner Bedtime   11/27/2023 75 255 219 203   11/28/2023 76 324 127 170   11/29/2023 68 206 418 181   11/30/2023 60        Fasting Glucose Average: 69 mg/dL  Lunch Glucose Average: 262 mg/dL   Dinner Glucose Average: 255 mg/dL   Bedtime Glucose Average: 185 mg/dL  Objective     There were no vitals taken for this visit.    Diabetes Pharmacotherapy:   Toujeo Solostar 100 units/mL: Inject 16 units under the skin once daily in the morning   Metformin  mg: Take three tablets daily  Humalog Kwikpen 100 units/mL: Inject as directed per sliding scale. 12-16-10 plus SSI.  <150 mg/dL - 0 units  151-200 mg/dL - 4 units  201-250 mg/dL - 6 units  251-300 mg/dL - 8 units  301-350 mg/dL - 10 units  351-400 mg/dL - 12 units    Steroid Therapy:   - Prednisone 20 mg: Take three tablets by mouth once daily (60 mg total dose taken at 7 am)    Lab Review  Lab Results   Component Value Date    BILITOT 0.5 12/08/2021    CALCIUM 9.0 10/13/2023    CO2 22 10/13/2023     10/13/2023    CREATININE 1.36 (H) 10/13/2023    GLUCOSE 376 (H) 10/13/2023    ALKPHOS 89 12/08/2021    K 4.3 10/13/2023    PROT 7.1 12/08/2021     10/13/2023    AST 21 12/08/2021    ALT 21 12/08/2021    BUN 28 (H) 10/13/2023    ANIONGAP 18 10/13/2023    ALBUMIN 4.3 12/08/2021     No results found for: \"TRIG\", \"CHOL\", \"LDLCALC\", \"HDL\"    Lab Results   Component Value Date    HGBA1C 6.3 (H) 07/23/2023    HGBA1C 6.8 (H) 05/25/2023    HGBA1C 6.2 (H) 05/18/2022     The ASCVD Risk score (Raj WEINBERG, et al., 2019) failed to calculate for the following reasons:    The patient has a prior MI or stroke diagnosis    Drug " Interactions:  No drug-drug interactions that require change in therapy    Assessment/Plan   Problem List Items Addressed This Visit       Hyperglycemia    Relevant Orders    Follow Up In [facility] - Pharmacy    Steroid-induced hyperglycemia    Relevant Orders    Follow Up In [facility] Pharmacy     Pharmacotherapy:  Change:   Humalog Kwikpen 100 units/mL: Inject as 12-16-10 units TID plus sliding scale - Base scale kept the same for todays encounter.   <150 mg/dL - 0 units  151-200 mg/dL - 4 units  201-250 mg/dL - 6 units  251-300 mg/dL - 8 units  301-350 mg/dL - 10 units  351-400 mg/dL - 12 units  >400 mg/dL > 14 units, call MD  Increase:   Metformin  mg: Take three tablets daily (Increased from 2 tablets daily)  Continue  Toujeo Solostar 100 units/mL: Inject 16 units under the skin once daily in the morning with breakfast   Metformin properly dosed for current renal function  Patient is on therapy with an intermediate acting steroid - Prednisone 60 mg TDD once daily   Patient was not on baseline pharmacotherapy for diabetes mellitus or prediabetes. Patient does not note a history of anitdiabetic agents.   Recommended to continue to test glucose QID - FBG, Pre-Lunch, Pre-Dinner, and Bedtime  Consideration for CGM would allow for more complete data to review if nocturnal or morning hypoglycemia occurs.   Patient advised to call with s/sx hypoglycemia or glucose <70 mg/dL. Additionally advised to call with glucose >400 mg/dL.  Compliance at present is estimated to be good.   Follow-up: I recommend diabetes care be  12/4/2023 at 10:30 am  .    Lior Bowser, PharmD    Continue all meds under the continuation of care with the referring provider and clinical pharmacy team.

## 2023-12-04 ENCOUNTER — CLINICAL SUPPORT (OUTPATIENT)
Dept: WOUND CARE | Facility: CLINIC | Age: 78
End: 2023-12-04
Payer: MEDICARE

## 2023-12-04 PROCEDURE — 99212 OFFICE O/P EST SF 10 MIN: CPT | Mod: 25

## 2023-12-07 ENCOUNTER — TELEMEDICINE (OUTPATIENT)
Dept: PHARMACY | Facility: HOSPITAL | Age: 78
End: 2023-12-07
Payer: MEDICARE

## 2023-12-07 DIAGNOSIS — R73.9 HYPERGLYCEMIA: ICD-10-CM

## 2023-12-07 DIAGNOSIS — R73.9 STEROID-INDUCED HYPERGLYCEMIA: Primary | ICD-10-CM

## 2023-12-07 DIAGNOSIS — T38.0X5A STEROID-INDUCED HYPERGLYCEMIA: Primary | ICD-10-CM

## 2023-12-07 NOTE — PROGRESS NOTES
Patient is sent at the request of Madi Sloan DO for my opinion regarding steroid-induced hyperglycemia.  My final recommendations will be communicated back to the requesting provider by way of shared medical record.    Subjective     Allergies   Allergen Reactions    Sulfa (Sulfonamide Antibiotics) Unknown and Rash     Current monitoring regimen:   Patient is using: glucometer     Date FBG Lunch Dinner Bedtime   10/31/2023 124 441 515 121   11/1/2023 112 251 403 141   11/2/2023 352 304 385 172   11/3/2023 96 302 402 217   11/4/2023 73 284 304 236   11/5/2023 73 171 220 166   11/6/2023 75      Average 129 292 371 175     Date FBG Lunch Dinner Bedtime   11/6/2023 75 224 345 130   11/7/2023 82 389 231 150   11/8/2023 86 294 240 203   11/9/2023 84      Average 82 302 272 161     Fasting Glucose Average: 82 mg/dL  Lunch Glucose Average: 302 mg/dL   Dinner Glucose Average: 272 mg/dL   Bedtime Glucose Average: 161 mg/dL     Date FBG Lunch Dinner Bedtime   11/9/2023 84 271 188 262   11/10/2023 70 221 340 114   11/11/2023 60 210 358 109   11/12/2023 57 177 350 268   11/13/2023 66      Average 67 219 309 188     Fasting Glucose Average: 67 mg/dL  Lunch Glucose Average: 219 mg/dL   Dinner Glucose Average: 309 mg/dL   Bedtime Glucose Average: 188 mg/dL     Date FBG Lunch Dinner Bedtime   11/13/2023 66 228 330 141   11/14/2023 62 234 282 133   11/15/2023 67 249 319 141   11/16/2023 84      Average 70 237 310 138     Fasting Glucose Average: 70 mg/dL  Lunch Glucose Average: 237 mg/dL   Dinner Glucose Average: 310 mg/dL   Bedtime Glucose Average: 138 mg/dL     Date FBG Lunch Dinner Bedtime   11/16/2023 84 189 272 104   11/17/2023 68 305 278 137   11/18/2023 89 242 210 143   11/19/2023 91 249 232 195   11/20/2023 140        Fasting Glucose Average: 94 mg/dL  Lunch Glucose Average: 246 mg/dL   Dinner Glucose Average: 248 mg/dL   Bedtime Glucose Average: 145 mg/dL     Date FBG Lunch Dinner Bedtime   11/20/2023 140 245 273 185    11/21/2023 83 288 217 196   11/22/2023 85 244 217 157   11/23/2023 80 380 288 212   11/24/2023 109 235 334 205   11/25/2023 88 233 142 111   11/26/2023 77 250 225 130   11/27/2023 75 255       Fasting Glucose Average: 92 mg/dL  Lunch Glucose Average: 266 mg/dL   Dinner Glucose Average: 242 mg/dL   Bedtime Glucose Average: 170 mg/dL     Date FBG Lunch Dinner Bedtime   11/27/2023 75 255 219 203   11/28/2023 76 324 127 170   11/29/2023 68 206 418 181   11/30/2023 60        Fasting Glucose Average: 69 mg/dL  Lunch Glucose Average: 262 mg/dL   Dinner Glucose Average: 255 mg/dL   Bedtime Glucose Average: 185 mg/dL    Date FBG Lunch Dinner Bedtime   12/1/2023 81 217 223 123   12/2/2023 97 158 174 230   12/3/2023 107 307 255 250   12/4/2023 72 171 210 169   12/5/2023 66 240 230 191   12/6/2023 71 189 264 149   12/7/2023 88 193       Current Regimen:   Humalog Kwikpen 100 units/mL: Inject as directed per sliding scale. 12-16-10 plus SSI.  Toujeo Solostar 100 units/mL: Inject 16 units under the skin once daily in the morning   Metformin ER 1500 mg  Fasting Glucose Average: 83 mg/dL  Lunch Glucose Average: 210 mg/dL   Dinner Glucose Average: 226 mg/dL   Bedtime Glucose Average: 185 mg/dL    Objective     There were no vitals taken for this visit.    Diabetes Pharmacotherapy:   Toujeo Solostar 100 units/mL: Inject 16 units under the skin once daily in the morning   Metformin  mg: Take three tablets daily  Humalog Kwikpen 100 units/mL: Inject as directed per sliding scale. 12-16-10 plus SSI.  <150 mg/dL - 0 units  151-200 mg/dL - 4 units  201-250 mg/dL - 6 units  251-300 mg/dL - 8 units  301-350 mg/dL - 10 units  351-400 mg/dL - 12 units    Steroid Therapy:   - Prednisone 20 mg: Take three tablets by mouth once daily (60 mg total dose taken at 7 am)    Lab Review  Lab Results   Component Value Date    BILITOT 0.5 12/08/2021    CALCIUM 9.0 10/13/2023    CO2 22 10/13/2023     10/13/2023    CREATININE 1.36 (H)  "10/13/2023    GLUCOSE 376 (H) 10/13/2023    ALKPHOS 89 12/08/2021    K 4.3 10/13/2023    PROT 7.1 12/08/2021     10/13/2023    AST 21 12/08/2021    ALT 21 12/08/2021    BUN 28 (H) 10/13/2023    ANIONGAP 18 10/13/2023    ALBUMIN 4.3 12/08/2021     No results found for: \"TRIG\", \"CHOL\", \"LDLCALC\", \"HDL\"    Lab Results   Component Value Date    HGBA1C 6.3 (H) 07/23/2023    HGBA1C 6.8 (H) 05/25/2023    HGBA1C 6.2 (H) 05/18/2022     The ASCVD Risk score (Raj WEINBERG, et al., 2019) failed to calculate for the following reasons:    The patient has a prior MI or stroke diagnosis    Drug Interactions:  No drug-drug interactions that require change in therapy    Assessment/Plan   Problem List Items Addressed This Visit       Hyperglycemia    Relevant Orders    Follow Up In [facility] - Pharmacy    Steroid-induced hyperglycemia - Primary    Relevant Orders    Follow Up In [facility] - Pharmacy     Pharmacotherapy:  Change:   Humalog Kwikpen 100 units/mL: Inject as 14-20-14 units TID plus sliding scale   <150 mg/dL - 0 units  151-200 mg/dL - 4 units  201-250 mg/dL - 6 units  251-300 mg/dL - 8 units  301-350 mg/dL - 10 units  351-400 mg/dL - 12 units  >400 mg/dL > 14 units, call MD  Sara Toroostar 100 units/mL: Inject 16 units under the skin once daily in the morning with breakfast   Metformin  mg: Take three tablets daily (Increased from 2 tablets daily)  Metformin properly dosed for current renal function  Patient is on therapy with an intermediate acting steroid - Prednisone 60 mg TDD once daily   Patient was not on baseline pharmacotherapy for diabetes mellitus or prediabetes. Patient does not note a history of anitdiabetic agents.   Recommended to continue to test glucose QID - FBG, Pre-Lunch, Pre-Dinner, and Bedtime  Consideration for CGM would allow for more complete data to review if nocturnal or morning hypoglycemia occurs.   Patient advised to call with s/sx hypoglycemia or " glucose <70 mg/dL. Additionally advised to call with glucose >400 mg/dL.  Compliance at present is estimated to be good.   Follow-up: I recommend diabetes care be  12/14/2023 at 10:30 am  .    Lior Bowser, PharmD    Continue all meds under the continuation of care with the referring provider and clinical pharmacy team.

## 2023-12-13 ENCOUNTER — OFFICE VISIT (OUTPATIENT)
Dept: WOUND CARE | Facility: CLINIC | Age: 78
End: 2023-12-13
Payer: MEDICARE

## 2023-12-13 ENCOUNTER — OFFICE VISIT (OUTPATIENT)
Dept: PRIMARY CARE | Facility: CLINIC | Age: 78
End: 2023-12-13
Payer: MEDICARE

## 2023-12-13 VITALS
BODY MASS INDEX: 25.05 KG/M2 | DIASTOLIC BLOOD PRESSURE: 78 MMHG | TEMPERATURE: 96.7 F | RESPIRATION RATE: 16 BRPM | WEIGHT: 175 LBS | SYSTOLIC BLOOD PRESSURE: 122 MMHG | OXYGEN SATURATION: 98 % | HEART RATE: 71 BPM | HEIGHT: 70 IN

## 2023-12-13 DIAGNOSIS — Z98.1 S/P LUMBAR SPINAL FUSION: ICD-10-CM

## 2023-12-13 DIAGNOSIS — Z00.00 ROUTINE HEALTH MAINTENANCE: ICD-10-CM

## 2023-12-13 DIAGNOSIS — G70.01 MYASTHENIA GRAVIS WITH (ACUTE) EXACERBATION (MULTI): ICD-10-CM

## 2023-12-13 DIAGNOSIS — E78.2 MIXED HYPERLIPIDEMIA: ICD-10-CM

## 2023-12-13 DIAGNOSIS — C61 PROSTATE CANCER (MULTI): ICD-10-CM

## 2023-12-13 DIAGNOSIS — N20.0 KIDNEY STONES: ICD-10-CM

## 2023-12-13 DIAGNOSIS — D64.9 ANEMIA, UNSPECIFIED TYPE: ICD-10-CM

## 2023-12-13 DIAGNOSIS — R60.0 BILATERAL LOWER EXTREMITY EDEMA: ICD-10-CM

## 2023-12-13 DIAGNOSIS — K76.0 HEPATIC STEATOSIS: ICD-10-CM

## 2023-12-13 DIAGNOSIS — R73.9 HYPERGLYCEMIA: ICD-10-CM

## 2023-12-13 DIAGNOSIS — N18.2 STAGE 2 CHRONIC KIDNEY DISEASE: ICD-10-CM

## 2023-12-13 DIAGNOSIS — N28.1 BILATERAL RENAL CYSTS: ICD-10-CM

## 2023-12-13 DIAGNOSIS — M10.9 GOUT, UNSPECIFIED CAUSE, UNSPECIFIED CHRONICITY, UNSPECIFIED SITE: Primary | ICD-10-CM

## 2023-12-13 DIAGNOSIS — I10 PRIMARY HYPERTENSION: ICD-10-CM

## 2023-12-13 DIAGNOSIS — I25.10 CORONARY ARTERY DISEASE INVOLVING NATIVE CORONARY ARTERY OF NATIVE HEART WITHOUT ANGINA PECTORIS: ICD-10-CM

## 2023-12-13 PROCEDURE — 3078F DIAST BP <80 MM HG: CPT | Performed by: STUDENT IN AN ORGANIZED HEALTH CARE EDUCATION/TRAINING PROGRAM

## 2023-12-13 PROCEDURE — 3074F SYST BP LT 130 MM HG: CPT | Performed by: STUDENT IN AN ORGANIZED HEALTH CARE EDUCATION/TRAINING PROGRAM

## 2023-12-13 PROCEDURE — 99214 OFFICE O/P EST MOD 30 MIN: CPT | Performed by: STUDENT IN AN ORGANIZED HEALTH CARE EDUCATION/TRAINING PROGRAM

## 2023-12-13 PROCEDURE — 1159F MED LIST DOCD IN RCRD: CPT | Performed by: STUDENT IN AN ORGANIZED HEALTH CARE EDUCATION/TRAINING PROGRAM

## 2023-12-13 PROCEDURE — 1036F TOBACCO NON-USER: CPT | Performed by: STUDENT IN AN ORGANIZED HEALTH CARE EDUCATION/TRAINING PROGRAM

## 2023-12-13 PROCEDURE — 1160F RVW MEDS BY RX/DR IN RCRD: CPT | Performed by: STUDENT IN AN ORGANIZED HEALTH CARE EDUCATION/TRAINING PROGRAM

## 2023-12-13 PROCEDURE — 11042 DBRDMT SUBQ TIS 1ST 20SQCM/<: CPT

## 2023-12-13 PROCEDURE — 1125F AMNT PAIN NOTED PAIN PRSNT: CPT | Performed by: STUDENT IN AN ORGANIZED HEALTH CARE EDUCATION/TRAINING PROGRAM

## 2023-12-13 ASSESSMENT — ENCOUNTER SYMPTOMS
DIARRHEA: 0
DIAPHORESIS: 0
DYSURIA: 0
LIGHT-HEADEDNESS: 0
SHORTNESS OF BREATH: 0
NAUSEA: 0
DIZZINESS: 0
FEVER: 0
VOMITING: 0
CHILLS: 0

## 2023-12-13 NOTE — PROGRESS NOTES
"Subjective   Patient ID: Fan Cope is a 78 y.o. male who presents for Follow-up.    Pt is here today to follow up.         MG symptoms are up and down. Feels symptoms are somewhat worse.    Seeing wound center for bilateral LE edema.     No other acute concerns today.     Review of Systems   Constitutional:  Negative for chills, diaphoresis and fever.   HENT:  Negative for hearing loss.    Eyes:  Negative for visual disturbance.   Respiratory:  Negative for shortness of breath.    Cardiovascular:  Negative for chest pain.   Gastrointestinal:  Negative for diarrhea, nausea and vomiting.   Endocrine: Negative for cold intolerance and heat intolerance.   Genitourinary:  Negative for dysuria.   Skin:  Negative for rash.   Neurological:  Negative for dizziness and light-headedness.       Objective   /78   Pulse 71   Temp 35.9 °C (96.7 °F) (Tympanic)   Resp 16   Ht 1.778 m (5' 10\")   Wt 79.4 kg (175 lb)   SpO2 98%   BMI 25.11 kg/m²     Physical Exam  Vitals reviewed.   Constitutional:       General: He is not in acute distress.     Appearance: Normal appearance.   HENT:      Head: Normocephalic.   Cardiovascular:      Rate and Rhythm: Normal rate and regular rhythm.   Pulmonary:      Effort: Pulmonary effort is normal. No respiratory distress.      Breath sounds: Normal breath sounds.   Abdominal:      General: There is no distension.   Musculoskeletal:         General: Swelling (bilateral LE) present. No deformity.   Skin:     Coloration: Skin is not jaundiced.   Neurological:      Mental Status: He is alert.         Assessment/Plan   Problem List Items Addressed This Visit       Anemia    Relevant Orders    CBC (Completed)    Kidney stones    S/P lumbar spinal fusion    Prostate cancer (CMS/HCC)    Primary hypertension    Mixed hyperlipidemia    Coronary artery disease involving native coronary artery of native heart without angina pectoris    Routine health maintenance    Hyperglycemia    Relevant Orders "    Hemoglobin A1c (Completed)    Myasthenia gravis with (acute) exacerbation (CMS/HCC)    Stage 2 chronic kidney disease    Relevant Orders    Basic metabolic panel (Completed)    Gout - Primary    Relevant Orders    Uric acid (Completed)    Bilateral lower extremity edema    Bilateral renal cysts    Hepatic steatosis     Mysathenia gravis  -Multiple admissions at [facility] in last 2 months  -Interval history per Dr. Caicedo with neurology: He was admitted at  from 8/27/23 to 9/9/23 for MG flare, presented with worsening of speech(losing his voice). He tried IVIG (2 doses) without benefit, had worsening of secretions and hypoxia needing intubation(x5 days). Had 5 cycles of PLEX (8/31 to 9/11/23) which helped, started on mycophenolate (500 mg bid).   -Currently on prednisone, cellcept. Plan to reduce prednisone and central line was removed for plex. Was recommended to start vygart IV.   -Continue f/u neurology    Lower extremity edema  -Connected with his cardiologist Dr. Roque  -Was diuresed and repeat echo showed EF 55%, repeat in 1-2 years.  -Continue f/u wound center.   -Repeat BMP     Left sided hearing loss  -Referred to audiology and ENT. Resolved with 2 days of benadryl. Had outside hearing test done and told needs hearing aids.      Right buttock hematoma  -Reports this resolved.      Status post lumbar fusion  -Continue follow-up per Naz Murphy and Dr. Main, plan per them.  Previously advised to discuss low back soreness with them and his sports medicine doctor.   -Per last note plan is for PT and f/u with Xray around November 2023 which has passed. He will connect with their office to discuss next steps      Primary hypertension  -Continue home losartan 100 mg a day, amlodipine 10 mg a day, follow-up with cardiology.  -Controlled today     Coronary artery disease  Hyperlipidemia  -Continue management per cardiology including rosuvastatin 20 mg a day, aspirin 81 mg a day. Deferred routine EKG to  cardiology appt  -Will see Dr. Roque     Kidney stones  -On allopurinol  -Reordered uric acid level to monitor      Renal cyst  -Seen on imaging in hospitalization, advise f/u with urology     Prostate cancer  - Continue follow-up with Dr. aFy.  Flomax, Myrbetriq, and further management per urology.     Stage IIIa chronic kidney disease  - CKD noted in EMR, GFR is 37 as of most recent  -Repeat BMP ordered     Hyperglycemia  -Referred to [facility] pharmacy clinic to assist with monitoring glucose while on prednisone.  - Discussed referral to endo if staying on insulin long term.      Cuadra's esophagus  Hiatal hernia  -on dexilant  -Hiatal hernia seen on CT chest 7/23  -Reflux controlled.      Normocytic anemia  -Stable since discharge around 9-9.5. On cellcept.       Mild hepatic steatosis seen on on U/s abdomen and spleen. Referred to hepatology previously.        Health Maintenance  -Prostate Cancer Screening: Via urology  -Vaccinations: Reports UTD pneumonia vaccine last winter, flu vaccine, shingles, covid vaccination and booster, UTD TDAP 2026  -Lung Cancer Screening: Not indicated due to age  -AAA Screening: Not inducated due to age  -Colonoscopy: 2025

## 2023-12-14 ENCOUNTER — LAB (OUTPATIENT)
Dept: LAB | Facility: LAB | Age: 78
End: 2023-12-14
Payer: MEDICARE

## 2023-12-14 ENCOUNTER — TELEMEDICINE (OUTPATIENT)
Dept: PHARMACY | Facility: HOSPITAL | Age: 78
End: 2023-12-14
Payer: MEDICARE

## 2023-12-14 DIAGNOSIS — R73.9 HYPERGLYCEMIA: ICD-10-CM

## 2023-12-14 DIAGNOSIS — T38.0X5A STEROID-INDUCED HYPERGLYCEMIA: Primary | ICD-10-CM

## 2023-12-14 DIAGNOSIS — M10.9 GOUT, UNSPECIFIED CAUSE, UNSPECIFIED CHRONICITY, UNSPECIFIED SITE: ICD-10-CM

## 2023-12-14 DIAGNOSIS — D64.9 ANEMIA, UNSPECIFIED TYPE: ICD-10-CM

## 2023-12-14 DIAGNOSIS — R73.9 STEROID-INDUCED HYPERGLYCEMIA: Primary | ICD-10-CM

## 2023-12-14 DIAGNOSIS — N18.2 STAGE 2 CHRONIC KIDNEY DISEASE: ICD-10-CM

## 2023-12-14 LAB
ANION GAP SERPL CALC-SCNC: 17 MMOL/L (ref 10–20)
BUN SERPL-MCNC: 35 MG/DL (ref 6–23)
CALCIUM SERPL-MCNC: 8.9 MG/DL (ref 8.6–10.3)
CHLORIDE SERPL-SCNC: 104 MMOL/L (ref 98–107)
CO2 SERPL-SCNC: 23 MMOL/L (ref 21–32)
CREAT SERPL-MCNC: 1.73 MG/DL (ref 0.5–1.3)
ERYTHROCYTE [DISTWIDTH] IN BLOOD BY AUTOMATED COUNT: 16.1 % (ref 11.5–14.5)
EST. AVERAGE GLUCOSE BLD GHB EST-MCNC: 65 MG/DL
GFR SERPL CREATININE-BSD FRML MDRD: 40 ML/MIN/1.73M*2
GLUCOSE SERPL-MCNC: 214 MG/DL (ref 74–99)
HBA1C MFR BLD: 3.9 %
HCT VFR BLD AUTO: 32.1 % (ref 41–52)
HGB BLD-MCNC: 9.5 G/DL (ref 13.5–17.5)
MCH RBC QN AUTO: 30.6 PG (ref 26–34)
MCHC RBC AUTO-ENTMCNC: 29.6 G/DL (ref 32–36)
MCV RBC AUTO: 104 FL (ref 80–100)
NRBC BLD-RTO: 0.4 /100 WBCS (ref 0–0)
PLATELET # BLD AUTO: 179 X10*3/UL (ref 150–450)
POTASSIUM SERPL-SCNC: 5.2 MMOL/L (ref 3.5–5.3)
RBC # BLD AUTO: 3.1 X10*6/UL (ref 4.5–5.9)
SODIUM SERPL-SCNC: 139 MMOL/L (ref 136–145)
URATE SERPL-MCNC: 4.6 MG/DL (ref 4–7.5)
WBC # BLD AUTO: 8.4 X10*3/UL (ref 4.4–11.3)

## 2023-12-14 PROCEDURE — 36415 COLL VENOUS BLD VENIPUNCTURE: CPT

## 2023-12-14 PROCEDURE — 83036 HEMOGLOBIN GLYCOSYLATED A1C: CPT

## 2023-12-14 PROCEDURE — 85027 COMPLETE CBC AUTOMATED: CPT

## 2023-12-14 PROCEDURE — 84550 ASSAY OF BLOOD/URIC ACID: CPT

## 2023-12-14 PROCEDURE — 80048 BASIC METABOLIC PNL TOTAL CA: CPT

## 2023-12-14 NOTE — PROGRESS NOTES
Patient is sent at the request of Madi Sloan DO for my opinion regarding steroid-induced hyperglycemia.  My final recommendations will be communicated back to the requesting provider by way of shared medical record.    Subjective     Allergies   Allergen Reactions    Sulfa (Sulfonamide Antibiotics) Unknown and Rash     Current monitoring regimen:   Patient is using: glucometer     Date FBG Lunch Dinner Bedtime   10/31/2023 124 441 515 121   11/1/2023 112 251 403 141   11/2/2023 352 304 385 172   11/3/2023 96 302 402 217   11/4/2023 73 284 304 236   11/5/2023 73 171 220 166   11/6/2023 75      Average 129 292 371 175     Date FBG Lunch Dinner Bedtime   11/6/2023 75 224 345 130   11/7/2023 82 389 231 150   11/8/2023 86 294 240 203   11/9/2023 84      Average 82 302 272 161     Fasting Glucose Average: 82 mg/dL  Lunch Glucose Average: 302 mg/dL   Dinner Glucose Average: 272 mg/dL   Bedtime Glucose Average: 161 mg/dL     Date FBG Lunch Dinner Bedtime   11/9/2023 84 271 188 262   11/10/2023 70 221 340 114   11/11/2023 60 210 358 109   11/12/2023 57 177 350 268   11/13/2023 66      Average 67 219 309 188     Fasting Glucose Average: 67 mg/dL  Lunch Glucose Average: 219 mg/dL   Dinner Glucose Average: 309 mg/dL   Bedtime Glucose Average: 188 mg/dL     Date FBG Lunch Dinner Bedtime   11/13/2023 66 228 330 141   11/14/2023 62 234 282 133   11/15/2023 67 249 319 141   11/16/2023 84      Average 70 237 310 138     Fasting Glucose Average: 70 mg/dL  Lunch Glucose Average: 237 mg/dL   Dinner Glucose Average: 310 mg/dL   Bedtime Glucose Average: 138 mg/dL     Date FBG Lunch Dinner Bedtime   11/16/2023 84 189 272 104   11/17/2023 68 305 278 137   11/18/2023 89 242 210 143   11/19/2023 91 249 232 195   11/20/2023 140        Fasting Glucose Average: 94 mg/dL  Lunch Glucose Average: 246 mg/dL   Dinner Glucose Average: 248 mg/dL   Bedtime Glucose Average: 145 mg/dL     Date FBG Lunch Dinner Bedtime   11/20/2023 140 245 273 185    11/21/2023 83 288 217 196   11/22/2023 85 244 217 157   11/23/2023 80 380 288 212   11/24/2023 109 235 334 205   11/25/2023 88 233 142 111   11/26/2023 77 250 225 130   11/27/2023 75 255       Fasting Glucose Average: 92 mg/dL  Lunch Glucose Average: 266 mg/dL   Dinner Glucose Average: 242 mg/dL   Bedtime Glucose Average: 170 mg/dL     Date FBG Lunch Dinner Bedtime   11/27/2023 75 255 219 203   11/28/2023 76 324 127 170   11/29/2023 68 206 418 181   11/30/2023 60        Fasting Glucose Average: 69 mg/dL  Lunch Glucose Average: 262 mg/dL   Dinner Glucose Average: 255 mg/dL   Bedtime Glucose Average: 185 mg/dL    Date FBG Lunch Dinner Bedtime   12/1/2023 81 217 223 123   12/2/2023 97 158 174 230   12/3/2023 107 307 255 250   12/4/2023 72 171 210 169   12/5/2023 66 240 230 191   12/6/2023 71 189 264 149   12/7/2023 88 193       Fasting Glucose Average: 83 mg/dL  Lunch Glucose Average: 210 mg/dL   Dinner Glucose Average: 226 mg/dL   Bedtime Glucose Average: 185 mg/dL    Date FBG Lunch Dinner Bedtime   12/8/2023 86 250 150 122   12/9/2023 100 227 144 104   12/10/2023 71 82 240 151   12/11/2023 86 215 224 140   12/12/2023 84 299 124 114   12/13/2023 72 129 325 84   12/14/2023 70      Average 81 200 201 119     Fasting Glucose Average: 81 mg/dL  Lunch Glucose Average: 200 mg/dL   Dinner Glucose Average: 201 mg/dL   Bedtime Glucose Average: 119 mg/dL    Objective     There were no vitals taken for this visit.    Diabetes Pharmacotherapy:   Toujeo Solostar 100 units/mL: Inject 16 units under the skin once daily in the morning   Metformin  mg: Take three tablets daily  Humalog Kwikpen 100 units/mL: Inject as 14-20-14 units TID plus sliding scale   <150 mg/dL - 0 units  151-200 mg/dL - 4 units  201-250 mg/dL - 6 units  251-300 mg/dL - 8 units  301-350 mg/dL - 10 units  351-400 mg/dL - 12 units  >400 mg/dL > 14 units, call MD    Steroid Therapy:   - Prednisone 20 mg: Take three tablets by mouth once daily (60 mg total  "dose taken at 7 am)    Lab Review  Lab Results   Component Value Date    BILITOT 0.5 12/08/2021    CALCIUM 9.0 10/13/2023    CO2 22 10/13/2023     10/13/2023    CREATININE 1.36 (H) 10/13/2023    GLUCOSE 376 (H) 10/13/2023    ALKPHOS 89 12/08/2021    K 4.3 10/13/2023    PROT 7.1 12/08/2021     10/13/2023    AST 21 12/08/2021    ALT 21 12/08/2021    BUN 28 (H) 10/13/2023    ANIONGAP 18 10/13/2023    ALBUMIN 4.3 12/08/2021     No results found for: \"TRIG\", \"CHOL\", \"LDLCALC\", \"HDL\"    Lab Results   Component Value Date    HGBA1C 6.3 (H) 07/23/2023    HGBA1C 6.8 (H) 05/25/2023    HGBA1C 6.2 (H) 05/18/2022     The ASCVD Risk score (Raj WEINBERG, et al., 2019) failed to calculate for the following reasons:    The patient has a prior MI or stroke diagnosis    Drug Interactions:  No drug-drug interactions that require change in therapy    Assessment/Plan   Problem List Items Addressed This Visit       Hyperglycemia    Steroid-induced hyperglycemia - Primary     Pharmacotherapy:  Continue:   Toujeo Solostar 100 units/mL: Inject 16 units under the skin once daily in the morning with breakfast   Humalog Kwikpen 100 units/mL: Inject as 14-20-14 units TID plus sliding scale   <150 mg/dL - 0 units  151-200 mg/dL - 4 units  201-250 mg/dL - 6 units  251-300 mg/dL - 8 units  301-350 mg/dL - 10 units  351-400 mg/dL - 12 units  >400 mg/dL > 14 units, call MD  Metformin  mg: Take three tablets daily   Metformin properly dosed for current renal function  Patient is on therapy with an intermediate acting steroid - Prednisone 60 mg TDD once daily   Patient was not on baseline pharmacotherapy for diabetes mellitus or prediabetes. Patient does not note a history of anitdiabetic agents.   Patient is completing updated labs on 12/14/2023   Recommended to continue to test glucose QID - FBG, Pre-Lunch, Pre-Dinner, and Bedtime  Consideration for CGM would allow for more complete data to review if nocturnal or morning hypoglycemia " occurs.   Patient advised to call with s/sx hypoglycemia or glucose <70 mg/dL. Additionally advised to call with glucose >400 mg/dL.  Compliance at present is estimated to be good.   Follow-up: I recommend diabetes care be  12/21/2023 at 10:30 am  .    Lior Bowser, PharmD    Continue all meds under the continuation of care with the referring provider and clinical pharmacy team.

## 2023-12-16 PROBLEM — M10.9 GOUT: Status: ACTIVE | Noted: 2023-12-16

## 2023-12-16 PROBLEM — R60.0 BILATERAL LOWER EXTREMITY EDEMA: Status: ACTIVE | Noted: 2023-12-16

## 2023-12-16 PROBLEM — N28.1 BILATERAL RENAL CYSTS: Status: ACTIVE | Noted: 2023-12-16

## 2023-12-16 PROBLEM — N18.2 STAGE 2 CHRONIC KIDNEY DISEASE: Status: ACTIVE | Noted: 2023-12-16

## 2023-12-16 PROBLEM — K76.0 HEPATIC STEATOSIS: Status: ACTIVE | Noted: 2023-12-16

## 2023-12-20 ENCOUNTER — OFFICE VISIT (OUTPATIENT)
Dept: WOUND CARE | Facility: CLINIC | Age: 78
End: 2023-12-20
Payer: MEDICARE

## 2023-12-20 PROCEDURE — 99213 OFFICE O/P EST LOW 20 MIN: CPT | Mod: 25

## 2023-12-21 ENCOUNTER — TELEMEDICINE (OUTPATIENT)
Dept: PHARMACY | Facility: HOSPITAL | Age: 78
End: 2023-12-21
Payer: MEDICARE

## 2023-12-21 DIAGNOSIS — R73.9 HYPERGLYCEMIA: ICD-10-CM

## 2023-12-21 DIAGNOSIS — T38.0X5A STEROID-INDUCED HYPERGLYCEMIA: ICD-10-CM

## 2023-12-21 DIAGNOSIS — R73.9 STEROID-INDUCED HYPERGLYCEMIA: ICD-10-CM

## 2023-12-21 RX ORDER — BLOOD-GLUCOSE SENSOR
EACH MISCELLANEOUS
Qty: 2 EACH | Refills: 11 | Status: SHIPPED | OUTPATIENT
Start: 2023-12-21 | End: 2023-12-22 | Stop reason: SDUPTHER

## 2023-12-21 NOTE — PROGRESS NOTES
Patient is sent at the request of Madi Sloan DO for my opinion regarding steroid-induced hyperglycemia.  My final recommendations will be communicated back to the requesting provider by way of shared medical record.    Subjective     Allergies   Allergen Reactions    Sulfa (Sulfonamide Antibiotics) Unknown and Rash     Current monitoring regimen:   Patient is using: glucometer     Date FBG Lunch Dinner Bedtime   10/31/2023 124 441 515 121   11/1/2023 112 251 403 141   11/2/2023 352 304 385 172   11/3/2023 96 302 402 217   11/4/2023 73 284 304 236   11/5/2023 73 171 220 166   11/6/2023 75      Average 129 292 371 175     Date FBG Lunch Dinner Bedtime   11/6/2023 75 224 345 130   11/7/2023 82 389 231 150   11/8/2023 86 294 240 203   11/9/2023 84      Average 82 302 272 161     Fasting Glucose Average: 82 mg/dL  Lunch Glucose Average: 302 mg/dL   Dinner Glucose Average: 272 mg/dL   Bedtime Glucose Average: 161 mg/dL     Date FBG Lunch Dinner Bedtime   11/9/2023 84 271 188 262   11/10/2023 70 221 340 114   11/11/2023 60 210 358 109   11/12/2023 57 177 350 268   11/13/2023 66      Average 67 219 309 188     Fasting Glucose Average: 67 mg/dL  Lunch Glucose Average: 219 mg/dL   Dinner Glucose Average: 309 mg/dL   Bedtime Glucose Average: 188 mg/dL     Date FBG Lunch Dinner Bedtime   11/13/2023 66 228 330 141   11/14/2023 62 234 282 133   11/15/2023 67 249 319 141   11/16/2023 84      Average 70 237 310 138     Fasting Glucose Average: 70 mg/dL  Lunch Glucose Average: 237 mg/dL   Dinner Glucose Average: 310 mg/dL   Bedtime Glucose Average: 138 mg/dL     Date FBG Lunch Dinner Bedtime   11/16/2023 84 189 272 104   11/17/2023 68 305 278 137   11/18/2023 89 242 210 143   11/19/2023 91 249 232 195   11/20/2023 140        Fasting Glucose Average: 94 mg/dL  Lunch Glucose Average: 246 mg/dL   Dinner Glucose Average: 248 mg/dL   Bedtime Glucose Average: 145 mg/dL     Date FBG Lunch Dinner Bedtime   11/20/2023 140 245 273 185    11/21/2023 83 288 217 196   11/22/2023 85 244 217 157   11/23/2023 80 380 288 212   11/24/2023 109 235 334 205   11/25/2023 88 233 142 111   11/26/2023 77 250 225 130   11/27/2023 75 255       Fasting Glucose Average: 92 mg/dL  Lunch Glucose Average: 266 mg/dL   Dinner Glucose Average: 242 mg/dL   Bedtime Glucose Average: 170 mg/dL     Date FBG Lunch Dinner Bedtime   11/27/2023 75 255 219 203   11/28/2023 76 324 127 170   11/29/2023 68 206 418 181   11/30/2023 60        Fasting Glucose Average: 69 mg/dL  Lunch Glucose Average: 262 mg/dL   Dinner Glucose Average: 255 mg/dL   Bedtime Glucose Average: 185 mg/dL    Date FBG Lunch Dinner Bedtime   12/1/2023 81 217 223 123   12/2/2023 97 158 174 230   12/3/2023 107 307 255 250   12/4/2023 72 171 210 169   12/5/2023 66 240 230 191   12/6/2023 71 189 264 149   12/7/2023 88 193       Fasting Glucose Average: 83 mg/dL  Lunch Glucose Average: 210 mg/dL   Dinner Glucose Average: 226 mg/dL   Bedtime Glucose Average: 185 mg/dL    Date FBG Lunch Dinner Bedtime   12/8/2023 86 250 150 122   12/9/2023 100 227 144 104   12/10/2023 71 82 240 151   12/11/2023 86 215 224 140   12/12/2023 84 299 124 114   12/13/2023 72 129 325 84   12/14/2023 70      Average 81 200 201 119     Fasting Glucose Average: 81 mg/dL  Lunch Glucose Average: 200 mg/dL   Dinner Glucose Average: 201 mg/dL   Bedtime Glucose Average: 119 mg/dL    Date FBG Lunch Dinner Bedtime   12/14/2023 70 288 88 139   12/15/2023 94 232 137 124   12/16/2023 100 101 89 178   12/17/2023 158 86 95 309   12/18/2023 187 56 192 135   12/19/2023 144 178 155 149   12/20/2023 290 74 99 182   12/21/2023 142      Average 148 145 122 173     Fasting Glucose Average: 148 mg/dL  Lunch Glucose Average: 145 mg/dL   Dinner Glucose Average: 122 mg/dL   Bedtime Glucose Average: 173 mg/dL    Hypoglycemia: 1 episode at 56 mg/dL - Patient notes this was not symptomatic. We reviewed the rule of 15 today.     Objective     There were no vitals taken for  "this visit.    Diabetes Pharmacotherapy:   Toujeo Solostar 100 units/mL: Inject 16 units under the skin once daily in the morning   Metformin  mg: Take three tablets daily  Humalog Kwikpen 100 units/mL: Inject as 14-20-14 units TID plus sliding scale   <150 mg/dL - 0 units  151-200 mg/dL - 4 units  201-250 mg/dL - 6 units  251-300 mg/dL - 8 units  301-350 mg/dL - 10 units  351-400 mg/dL - 12 units  >400 mg/dL > 14 units, call MD    Steroid Therapy:   - Prednisone 20 mg: Take 2.5 tablets by mouth once daily (60 mg decreased to 50 mg as of 1 week ago after our visit)     Lab Review  Lab Results   Component Value Date    BILITOT 0.5 12/08/2021    CALCIUM 8.9 12/14/2023    CO2 23 12/14/2023     12/14/2023    CREATININE 1.73 (H) 12/14/2023    GLUCOSE 214 (H) 12/14/2023    ALKPHOS 89 12/08/2021    K 5.2 12/14/2023    PROT 7.1 12/08/2021     12/14/2023    AST 21 12/08/2021    ALT 21 12/08/2021    BUN 35 (H) 12/14/2023    ANIONGAP 17 12/14/2023    ALBUMIN 4.3 12/08/2021     No results found for: \"TRIG\", \"CHOL\", \"LDLCALC\", \"HDL\"    Lab Results   Component Value Date    HGBA1C 3.9 12/14/2023    HGBA1C 6.3 (H) 07/23/2023    HGBA1C 6.8 (H) 05/25/2023     The ASCVD Risk score (Raj DK, et al., 2019) failed to calculate for the following reasons:    The patient has a prior MI or stroke diagnosis    Drug Interactions:  No drug-drug interactions that require change in therapy    Assessment/Plan   Problem List Items Addressed This Visit       Hyperglycemia    Relevant Medications    blood-glucose sensor (FreeStyle Donta 3 Sensor) device    Other Relevant Orders    Follow Up In [facility] - Pharmacy    Steroid-induced hyperglycemia    Relevant Medications    blood-glucose sensor (FreeStyle Donta 3 Sensor) device    Other Relevant Orders    Follow Up In [facility] - Pharmacy     Pharmacotherapy:  Patient completed updated labs on 12/14/2023. At this time his hemoglobin A1c reported to be 3.9% " with a hemoglobin of 9.5 g/dL. This may be a falsely low hemoglobin A1c, however this is still a concerning measurement. I have sent for a Donta 3 sensor to be filled at his local pharmacy. It is likely that this is required to be billed through a DME supplier and/or a part B filling pharmacy. His glucose was elevated at 214 mg/dL on his BMP on this date and has reported glucose values averaging >120 mg/dL for the past week. I am continuing with his regimen as directed today.   Continue:   Toujeo Solostar 100 units/mL: Inject 16 units under the skin once daily in the morning with breakfast   Humalog Kwikpen 100 units/mL: Inject as 14-20-14 units TID plus sliding scale   <150 mg/dL - 0 units  151-200 mg/dL - 4 units  201-250 mg/dL - 6 units  251-300 mg/dL - 8 units  301-350 mg/dL - 10 units  351-400 mg/dL - 12 units  >400 mg/dL > 14 units, call MD  Metformin  mg: Take three tablets daily   Metformin properly dosed for current renal function  Patient is on therapy with an intermediate acting steroid - Prednisone 50 mg TDD once daily   Patient was not on baseline pharmacotherapy for diabetes mellitus or prediabetes. Patient does not note a history of anitdiabetic agents.   Recommended to continue to test glucose QID - FBG, Pre-Lunch, Pre-Dinner, and Bedtime  Compliance at present is estimated to be good.   Follow-up: I recommend diabetes care be  12/28/2023 at 9:00 am  .    Lior Bowser, PharmD    Continue all meds under the continuation of care with the referring provider and clinical pharmacy team.

## 2023-12-21 NOTE — PATIENT INSTRUCTIONS
HYPOGLYCEMIA CAUSES  Low blood glucose can happen if you:  - Take too much insulin  - Take too much of oral diabetes medications that cause your body to release more insulin (eg, sulfonylureas, meglitinides)  - Do not eat enough food  - Exercise vigorously without eating a snack or decreasing your insulin dose beforehand  - Wait too long between meals  - Drink a lot of alcohol    HYPOGLYCEMIA SYMPTOMS  The symptoms of low blood glucose vary from person to person and can change over time. During the early stages of low blood glucose, you may:  - Sweat  - Tremble  - Feel hungry  - Feel anxious    If untreated, symptoms can become more severe and can include:  - Difficulty walking  - Weakness  - Difficulty seeing clearly  - Bizarre behavior or personality changes  - Confusion  - Passing out or having a seizure    HYPOGLYCEMIA TREATMENT  To treat low blood glucose, eat 15 to 20 grams of fast-acting carbohydrate. You can do this by eating 3 or 4 glucose tablets or 6 to 8 hard candies, or drinking 1/2 cup of fruit juice.    This amount of food is usually enough to raise your blood glucose into a safe range without causing it to get too high. Avoid foods that contain fat (like candy bars) or protein (such as cheese) initially, since they slow down your body's ability to absorb glucose.    Check your blood glucose again after 15 minutes and repeat treatment if your level is still low. Monitor your blood glucose levels more frequently for the next few hours to ensure your blood glucose levels are not low.

## 2023-12-22 DIAGNOSIS — R73.9 HYPERGLYCEMIA: ICD-10-CM

## 2023-12-22 DIAGNOSIS — R73.9 STEROID-INDUCED HYPERGLYCEMIA: ICD-10-CM

## 2023-12-22 DIAGNOSIS — T38.0X5A STEROID-INDUCED HYPERGLYCEMIA: ICD-10-CM

## 2023-12-22 RX ORDER — BLOOD-GLUCOSE SENSOR
EACH MISCELLANEOUS
Qty: 2 EACH | Refills: 11 | Status: SHIPPED | OUTPATIENT
Start: 2023-12-22

## 2023-12-22 NOTE — PROGRESS NOTES
CGM covered at [facility] with no copay - covered through Medicare part B. Patient will come to the office on 12/26/2023 at 9 am for an in person demonstration and connection of sensor to office [employer].

## 2023-12-26 ENCOUNTER — CLINICAL SUPPORT (OUTPATIENT)
Dept: PRIMARY CARE | Facility: CLINIC | Age: 78
End: 2023-12-26
Payer: MEDICARE

## 2023-12-26 DIAGNOSIS — R73.9 HYPERGLYCEMIA: ICD-10-CM

## 2023-12-26 DIAGNOSIS — T38.0X5A STEROID-INDUCED HYPERGLYCEMIA: ICD-10-CM

## 2023-12-26 DIAGNOSIS — R73.9 STEROID-INDUCED HYPERGLYCEMIA: ICD-10-CM

## 2023-12-26 NOTE — PROGRESS NOTES
Patient is sent at the request of Madi Sloan DO for my opinion regarding steroid-induced hyperglycemia.  My final recommendations will be communicated back to the requesting provider by way of shared medical record.    Subjective     Allergies   Allergen Reactions    Sulfa (Sulfonamide Antibiotics) Unknown and Rash     Current monitoring regimen:   Patient is using: glucometer     Date FBG Lunch Dinner Bedtime   10/31/2023 124 441 515 121   11/1/2023 112 251 403 141   11/2/2023 352 304 385 172   11/3/2023 96 302 402 217   11/4/2023 73 284 304 236   11/5/2023 73 171 220 166   11/6/2023 75      Average 129 292 371 175     Date FBG Lunch Dinner Bedtime   11/6/2023 75 224 345 130   11/7/2023 82 389 231 150   11/8/2023 86 294 240 203   11/9/2023 84      Average 82 302 272 161     Fasting Glucose Average: 82 mg/dL  Lunch Glucose Average: 302 mg/dL   Dinner Glucose Average: 272 mg/dL   Bedtime Glucose Average: 161 mg/dL     Date FBG Lunch Dinner Bedtime   11/9/2023 84 271 188 262   11/10/2023 70 221 340 114   11/11/2023 60 210 358 109   11/12/2023 57 177 350 268   11/13/2023 66      Average 67 219 309 188     Fasting Glucose Average: 67 mg/dL  Lunch Glucose Average: 219 mg/dL   Dinner Glucose Average: 309 mg/dL   Bedtime Glucose Average: 188 mg/dL     Date FBG Lunch Dinner Bedtime   11/13/2023 66 228 330 141   11/14/2023 62 234 282 133   11/15/2023 67 249 319 141   11/16/2023 84      Average 70 237 310 138     Fasting Glucose Average: 70 mg/dL  Lunch Glucose Average: 237 mg/dL   Dinner Glucose Average: 310 mg/dL   Bedtime Glucose Average: 138 mg/dL     Date FBG Lunch Dinner Bedtime   11/16/2023 84 189 272 104   11/17/2023 68 305 278 137   11/18/2023 89 242 210 143   11/19/2023 91 249 232 195   11/20/2023 140        Fasting Glucose Average: 94 mg/dL  Lunch Glucose Average: 246 mg/dL   Dinner Glucose Average: 248 mg/dL   Bedtime Glucose Average: 145 mg/dL     Date FBG Lunch Dinner Bedtime   11/20/2023 140 245 273 185    11/21/2023 83 288 217 196   11/22/2023 85 244 217 157   11/23/2023 80 380 288 212   11/24/2023 109 235 334 205   11/25/2023 88 233 142 111   11/26/2023 77 250 225 130   11/27/2023 75 255       Fasting Glucose Average: 92 mg/dL  Lunch Glucose Average: 266 mg/dL   Dinner Glucose Average: 242 mg/dL   Bedtime Glucose Average: 170 mg/dL     Date FBG Lunch Dinner Bedtime   11/27/2023 75 255 219 203   11/28/2023 76 324 127 170   11/29/2023 68 206 418 181   11/30/2023 60        Fasting Glucose Average: 69 mg/dL  Lunch Glucose Average: 262 mg/dL   Dinner Glucose Average: 255 mg/dL   Bedtime Glucose Average: 185 mg/dL    Date FBG Lunch Dinner Bedtime   12/1/2023 81 217 223 123   12/2/2023 97 158 174 230   12/3/2023 107 307 255 250   12/4/2023 72 171 210 169   12/5/2023 66 240 230 191   12/6/2023 71 189 264 149   12/7/2023 88 193       Fasting Glucose Average: 83 mg/dL  Lunch Glucose Average: 210 mg/dL   Dinner Glucose Average: 226 mg/dL   Bedtime Glucose Average: 185 mg/dL    Date FBG Lunch Dinner Bedtime   12/8/2023 86 250 150 122   12/9/2023 100 227 144 104   12/10/2023 71 82 240 151   12/11/2023 86 215 224 140   12/12/2023 84 299 124 114   12/13/2023 72 129 325 84   12/14/2023 70      Average 81 200 201 119     Fasting Glucose Average: 81 mg/dL  Lunch Glucose Average: 200 mg/dL   Dinner Glucose Average: 201 mg/dL   Bedtime Glucose Average: 119 mg/dL    Date FBG Lunch Dinner Bedtime   12/14/2023 70 288 88 139   12/15/2023 94 232 137 124   12/16/2023 100 101 89 178   12/17/2023 158 86 95 309   12/18/2023 187 56 192 135   12/19/2023 144 178 155 149   12/20/2023 290 74 99 182   12/21/2023 142      Average 148 145 122 173     Fasting Glucose Average: 148 mg/dL  Lunch Glucose Average: 145 mg/dL   Dinner Glucose Average: 122 mg/dL   Bedtime Glucose Average: 173 mg/dL    Date FBG Lunch  Dinner Bedtime   12/20/2023 290 74 99 182   12/21/2023 142 123 70 190   12/22/2023 182 120 156 140   12/23/2023 139 15 20 147   12/24/2023 89  "112 143 250   12/25/2023 87 212 249 123   12/26/2023 78      Average 144 109 123 172     Fasting Glucose Average: 144  mg/dL  Lunch Glucose Average: 109 mg/dL   Dinner Glucose Average: 123 mg/dL   Bedtime Glucose Average: 172 mg/dL    Objective     There were no vitals taken for this visit.    Diabetes Pharmacotherapy:   Toujeo Solostar 100 units/mL: Inject 16 units under the skin once daily in the morning   Metformin  mg: Take three tablets daily  Humalog Kwikpen 100 units/mL: Inject as 14-20-14 units TID plus sliding scale   <150 mg/dL - 0 units  151-200 mg/dL - 4 units  201-250 mg/dL - 6 units  251-300 mg/dL - 8 units  301-350 mg/dL - 10 units  351-400 mg/dL - 12 units  >400 mg/dL > 14 units, call MD    Steroid Therapy:   Prednisone 20 mg: Take 2.5 tablets by mouth once daily    Lab Review  Lab Results   Component Value Date    BILITOT 0.5 12/08/2021    CALCIUM 8.9 12/14/2023    CO2 23 12/14/2023     12/14/2023    CREATININE 1.73 (H) 12/14/2023    GLUCOSE 214 (H) 12/14/2023    ALKPHOS 89 12/08/2021    K 5.2 12/14/2023    PROT 7.1 12/08/2021     12/14/2023    AST 21 12/08/2021    ALT 21 12/08/2021    BUN 35 (H) 12/14/2023    ANIONGAP 17 12/14/2023    ALBUMIN 4.3 12/08/2021     No results found for: \"TRIG\", \"CHOL\", \"LDLCALC\", \"HDL\"    Lab Results   Component Value Date    HGBA1C 3.9 12/14/2023    HGBA1C 6.3 (H) 07/23/2023    HGBA1C 6.8 (H) 05/25/2023     The ASCVD Risk score (Raj DK, et al., 2019) failed to calculate for the following reasons:    The patient has a prior MI or stroke diagnosis    Drug Interactions:  No drug-drug interactions that require change in therapy    Assessment/Plan   Problem List Items Addressed This Visit       Hyperglycemia    Relevant Orders    Follow Up In [facility] - Pharmacy    Steroid-induced hyperglycemia    Relevant Orders    Follow Up In [facility] - Pharmacy     Pharmacotherapy:  Continue:   Toujeo Solostar 100 units/mL: Inject 16 units " under the skin once daily in the morning with breakfast   Humalog Kwikpen 100 units/mL: Inject as 14-20-14 units TID plus sliding scale   <150 mg/dL - 0 units  151-200 mg/dL - 4 units  201-250 mg/dL - 6 units  251-300 mg/dL - 8 units  301-350 mg/dL - 10 units  351-400 mg/dL - 12 units  >400 mg/dL > 14 units, call MD  Metformin  mg: Take three tablets daily   Metformin properly dosed for current renal function  Donta 3 covered at no copay at [facility] pharmacy through Medicare Part B plan   Patient is on therapy with an intermediate acting steroid - Prednisone 50 mg TDD once daily   Patient was not on baseline pharmacotherapy for diabetes mellitus or prediabetes. Patient does not note a history of anitdiabetic agents.   During appointment today, I completed the patient training, application and startup of the Freestyle Donta 3 sensor.    Pt cleaned arm, assembled donta sensor application kit, and applied sensor  Created donta account through [employer]  Provided link for pt to allow access for provider through [employer] portal  Provided education on the following  Frequency of sensor changes  What to do if a sensor falls off  How to interpret readings from Freestyle Donta (arrows, magnifying glass with blood drop)  Discussed difference between serum glucose and blood glucose levels  Discussed what to do if pt feels like she is having hypoglycemic symptoms but CGM is not showing a hypoglycemic episode  Discussed how often a patient should scan sensor at a minimum  Discussed that [employer] portal does NOT alert provider to readings. Pt needs to call or send my chart message if pt needs provider to look at glucose readings  Reviewed data that is available through [employer] and target goals  Avg glucoses  Time in range  Sensor capture rate  Low glucose events  Logbook  Compliance at present is estimated to be good.   Follow-up: I recommend diabetes care be  1/4/2024 at 9:00 am  .    Lior Bowser, PharmD    Continue all meds  under the continuation of care with the referring provider and clinical pharmacy team.

## 2023-12-26 NOTE — Clinical Note
During our encounter today Fan noted notable bilateral leg edema, he was wondering about potential for initiation with a diuretic. Thank you!

## 2023-12-27 ENCOUNTER — OFFICE VISIT (OUTPATIENT)
Dept: WOUND CARE | Facility: CLINIC | Age: 78
End: 2023-12-27
Payer: MEDICARE

## 2023-12-27 PROCEDURE — 11042 DBRDMT SUBQ TIS 1ST 20SQCM/<: CPT

## 2023-12-28 ENCOUNTER — APPOINTMENT (OUTPATIENT)
Dept: PHARMACY | Facility: HOSPITAL | Age: 78
End: 2023-12-28
Payer: MEDICARE

## 2024-01-03 ENCOUNTER — OFFICE VISIT (OUTPATIENT)
Dept: WOUND CARE | Facility: CLINIC | Age: 79
End: 2024-01-03
Payer: MEDICARE

## 2024-01-03 PROCEDURE — 11042 DBRDMT SUBQ TIS 1ST 20SQCM/<: CPT

## 2024-01-04 ENCOUNTER — TELEMEDICINE (OUTPATIENT)
Dept: PHARMACY | Facility: HOSPITAL | Age: 79
End: 2024-01-04
Payer: MEDICARE

## 2024-01-04 DIAGNOSIS — R73.9 HYPERGLYCEMIA: ICD-10-CM

## 2024-01-04 DIAGNOSIS — T38.0X5A STEROID-INDUCED HYPERGLYCEMIA: Primary | ICD-10-CM

## 2024-01-04 DIAGNOSIS — R73.9 STEROID-INDUCED HYPERGLYCEMIA: Primary | ICD-10-CM

## 2024-01-04 NOTE — PROGRESS NOTES
Patient is sent at the request of Madi Sloan DO for my opinion regarding steroid-induced hyperglycemia.  My final recommendations will be communicated back to the requesting provider by way of shared medical record.    Subjective     Allergies   Allergen Reactions    Sulfa (Sulfonamide Antibiotics) Unknown and Rash     Current monitoring regimen:   Patient is using: glucometer / CGM - Freestyle Donta 3     Connected to office [employer] portal         December 1, 2023 - December 26, 2023 Glucometer readings    Date FBG Lunch Dinner Bedtime   12/1/2023 81 217 223 123   12/2/2023 97 158 174 230   12/3/2023 107 307 255 250   12/4/2023 72 171 210 169   12/5/2023 66 240 230 191   12/6/2023 71 189 264 149   12/7/2023 88 193     12/8/2023 86 250 150 122   12/9/2023 100 227 144 104   12/10/2023 71 82 240 151   12/11/2023 86 215 224 140   12/12/2023 84 299 124 114   12/13/2023 72 129 325 84   12/14/2023 70 288 88 139   12/15/2023 94 232 137 124   12/16/2023 100 101 89 178   12/17/2023 158 86 95 309   12/18/2023 187 56 192 135   12/19/2023 144 178 155 149   12/20/2023 290 74 99 182   12/21/2023 142 123 70 190   12/22/2023 182 120 156 140   12/23/2023 139 15 20 147   12/24/2023 89 112 143 250   12/25/2023 87 212 249 123   12/26/2023 78      Average 109 171 169 162     Fasting Glucose Average: 109  mg/dL  Lunch Glucose Average: 171 mg/dL   Dinner Glucose Average: 169 mg/dL   Bedtime Glucose Average: 162 mg/dL    Objective     There were no vitals taken for this visit.    Diabetes Pharmacotherapy:   Toujeo Solostar 100 units/mL: Inject 16 units under the skin once daily in the morning   Metformin  mg: Take three tablets daily  Humalog Kwikpen 100 units/mL: Inject as 14-20-14 units TID plus sliding scale   <150 mg/dL - 0 units  151-200 mg/dL - 4 units  201-250 mg/dL - 6 units  251-300 mg/dL - 8 units  301-350 mg/dL - 10 units  351-400 mg/dL - 12 units  >400 mg/dL > 14 units, call MD    Steroid Therapy:   Prednisone 20  "mg: Take 2 tablets by mouth once daily    Lab Review  Lab Results   Component Value Date    BILITOT 0.5 12/08/2021    CALCIUM 8.9 12/14/2023    CO2 23 12/14/2023     12/14/2023    CREATININE 1.73 (H) 12/14/2023    GLUCOSE 214 (H) 12/14/2023    ALKPHOS 89 12/08/2021    K 5.2 12/14/2023    PROT 7.1 12/08/2021     12/14/2023    AST 21 12/08/2021    ALT 21 12/08/2021    BUN 35 (H) 12/14/2023    ANIONGAP 17 12/14/2023    ALBUMIN 4.3 12/08/2021     No results found for: \"TRIG\", \"CHOL\", \"LDLCALC\", \"HDL\"    Lab Results   Component Value Date    HGBA1C 3.9 12/14/2023    HGBA1C 6.3 (H) 07/23/2023    HGBA1C 6.8 (H) 05/25/2023     The ASCVD Risk score (Raj WEINBERG, et al., 2019) failed to calculate for the following reasons:    The patient has a prior MI or stroke diagnosis    Drug Interactions:  No drug-drug interactions that require change in therapy    Assessment/Plan   Problem List Items Addressed This Visit       Hyperglycemia    Relevant Orders    Follow Up In [facility] - Pharmacy    Steroid-induced hyperglycemia - Primary    Relevant Orders    Follow Up In [facility] - Pharmacy     Pharmacotherapy:  Continue:   Toujeo Solostar 100 units/mL: Inject 10 units under the skin once daily in the morning with breakfast   Decreased from 16 units to 10 units daily based on overnight lows  Humalog Kwikpen 100 units/mL: Inject as 14-22-14 units TID plus sliding scale   <150 mg/dL - 0 units  151-200 mg/dL - 4 units  201-250 mg/dL - 6 units  251-300 mg/dL - 8 units  301-350 mg/dL - 10 units  351-400 mg/dL - 12 units  >400 mg/dL > 14 units, call MD  Metformin  mg: Take three tablets daily   Metformin properly dosed for current renal function  Donta 3 covered at no copay at [facility] pharmacy through Medicare Part B plan   Patient is on therapy with an intermediate acting steroid - Prednisone 40 mg TDD once daily   Patient was not on baseline pharmacotherapy for diabetes mellitus or prediabetes. Patient " does not note a history of anitdiabetic agents.  Compliance at present is estimated to be good.   Follow-up: I recommend diabetes care be  1/11/2024 at 9:00 am  .    Lior Bowser, PharmD    Continue all meds under the continuation of care with the referring provider and clinical pharmacy team.

## 2024-01-10 ENCOUNTER — OFFICE VISIT (OUTPATIENT)
Dept: WOUND CARE | Facility: CLINIC | Age: 79
End: 2024-01-10
Payer: MEDICARE

## 2024-01-10 PROCEDURE — 11042 DBRDMT SUBQ TIS 1ST 20SQCM/<: CPT

## 2024-01-10 PROCEDURE — 11721 DEBRIDE NAIL 6 OR MORE: CPT

## 2024-01-11 ENCOUNTER — TELEMEDICINE (OUTPATIENT)
Dept: PHARMACY | Facility: HOSPITAL | Age: 79
End: 2024-01-11
Payer: MEDICARE

## 2024-01-11 DIAGNOSIS — T38.0X5A STEROID-INDUCED HYPERGLYCEMIA: ICD-10-CM

## 2024-01-11 DIAGNOSIS — R73.9 STEROID-INDUCED HYPERGLYCEMIA: ICD-10-CM

## 2024-01-11 DIAGNOSIS — R73.9 HYPERGLYCEMIA: ICD-10-CM

## 2024-01-11 NOTE — PROGRESS NOTES
"Patient is sent at the request of Madi Sloan DO for my opinion regarding steroid-induced hyperglycemia.  My final recommendations will be communicated back to the requesting provider by way of shared medical record.    Subjective     Allergies   Allergen Reactions    Sulfa (Sulfonamide Antibiotics) Unknown and Rash     Current monitoring regimen:   Patient is using: glucometer / CGM - Freestyle Donta 3     Connected to office [employer] portal             Objective     There were no vitals taken for this visit.    Diabetes Pharmacotherapy:   Toujeo Solostar 100 units/mL: Inject 14 units under the skin once daily in the morning   Metformin  mg: Take three tablets daily  Humalog Kwikpen 100 units/mL: Inject as 14-22-14 units TID plus sliding scale   <150 mg/dL - 0 units  151-200 mg/dL - 4 units  201-250 mg/dL - 6 units  251-300 mg/dL - 8 units  301-350 mg/dL - 10 units  351-400 mg/dL - 12 units  >400 mg/dL > 14 units, call MD    Steroid Therapy:   Prednisone 20 mg: Take 2 tablets by mouth once daily    Lab Review  Lab Results   Component Value Date    BILITOT 0.5 12/08/2021    CALCIUM 8.9 12/14/2023    CO2 23 12/14/2023     12/14/2023    CREATININE 1.73 (H) 12/14/2023    GLUCOSE 214 (H) 12/14/2023    ALKPHOS 89 12/08/2021    K 5.2 12/14/2023    PROT 7.1 12/08/2021     12/14/2023    AST 21 12/08/2021    ALT 21 12/08/2021    BUN 35 (H) 12/14/2023    ANIONGAP 17 12/14/2023    ALBUMIN 4.3 12/08/2021     No results found for: \"TRIG\", \"CHOL\", \"LDLCALC\", \"HDL\"    Lab Results   Component Value Date    HGBA1C 3.9 12/14/2023    HGBA1C 6.3 (H) 07/23/2023    HGBA1C 6.8 (H) 05/25/2023     The ASCVD Risk score (Raj DK, et al., 2019) failed to calculate for the following reasons:    The patient has a prior MI or stroke diagnosis    Drug Interactions:  No drug-drug interactions that require change in therapy    Assessment/Plan   Problem List Items Addressed This Visit       Hyperglycemia    Relevant Orders    " Follow Up In [facility]    Steroid-induced hyperglycemia    Relevant Orders    Follow Up In [facility]     Pharmacotherapy:  Continue:   Toujeo Solostar 100 units/mL: Inject 14 units under the skin once daily in the morning with breakfast   Humalog Kwikpen 100 units/mL: Inject as 14-26-14 units TID plus sliding scale   <150 mg/dL - 0 units  151-200 mg/dL - 4 units  201-250 mg/dL - 6 units  251-300 mg/dL - 8 units  301-350 mg/dL - 10 units  351-400 mg/dL - 12 units  >400 mg/dL > 14 units, call MD  Metformin  mg: Take three tablets daily   Metformin properly dosed for current renal function  Donta 3 covered at no copay at [facility] pharmacy through Medicare Part B plan   Patient is on therapy with an intermediate acting steroid - Prednisone 40 mg TDD once daily   Patient was not on baseline pharmacotherapy for diabetes mellitus or prediabetes. Patient does not note a history of anitdiabetic agents.  Compliance at present is estimated to be good.   Follow-up: I recommend diabetes care be  1/18/2024 at 10:30 am  .    Lior Bowser, PharmD    Continue all meds under the continuation of care with the referring provider and clinical pharmacy team.

## 2024-01-15 DIAGNOSIS — T38.0X5A STEROID-INDUCED HYPERGLYCEMIA: ICD-10-CM

## 2024-01-15 DIAGNOSIS — R73.9 STEROID-INDUCED HYPERGLYCEMIA: ICD-10-CM

## 2024-01-15 RX ORDER — INSULIN LISPRO 100 [IU]/ML
INJECTION, SOLUTION INTRAVENOUS; SUBCUTANEOUS
Qty: 15 ML | Refills: 5 | Status: SHIPPED | OUTPATIENT
Start: 2024-01-15

## 2024-01-15 NOTE — PROGRESS NOTES
Patient called for updated prescription for Humalog, prescription sent. Additionally reported glycemic spike despite not eating or drinking any sugar/carbohydrate containing foods. Patient injected 34 units of Humalog with glucose spike to 400 mg/dL. Advised to call me if glucose does not return to 150-200 mg/dL around 6-8 PM.         Lior Bowser, PharmD

## 2024-01-16 ENCOUNTER — ANCILLARY PROCEDURE (OUTPATIENT)
Dept: RADIOLOGY | Facility: CLINIC | Age: 79
End: 2024-01-16
Payer: MEDICARE

## 2024-01-16 ENCOUNTER — OFFICE VISIT (OUTPATIENT)
Dept: PRIMARY CARE | Facility: CLINIC | Age: 79
End: 2024-01-16
Payer: MEDICARE

## 2024-01-16 VITALS
BODY MASS INDEX: 25.48 KG/M2 | SYSTOLIC BLOOD PRESSURE: 125 MMHG | DIASTOLIC BLOOD PRESSURE: 87 MMHG | HEIGHT: 70 IN | OXYGEN SATURATION: 97 % | WEIGHT: 178 LBS | HEART RATE: 81 BPM

## 2024-01-16 DIAGNOSIS — M79.671 RIGHT FOOT PAIN: ICD-10-CM

## 2024-01-16 DIAGNOSIS — R22.41 LOCALIZED SWELLING OF RIGHT FOOT: Primary | ICD-10-CM

## 2024-01-16 DIAGNOSIS — R22.41 LOCALIZED SWELLING OF RIGHT FOOT: ICD-10-CM

## 2024-01-16 DIAGNOSIS — G47.00 INSOMNIA, UNSPECIFIED TYPE: ICD-10-CM

## 2024-01-16 PROBLEM — R42 DIZZINESS: Status: ACTIVE | Noted: 2023-10-04

## 2024-01-16 PROBLEM — M51.34 DEGENERATION OF INTERVERTEBRAL DISC OF THORACIC REGION WITH OSTEOPHYTE: Status: ACTIVE | Noted: 2024-01-16

## 2024-01-16 PROBLEM — R60.0 LOCALIZED EDEMA: Status: ACTIVE | Noted: 2023-11-14

## 2024-01-16 PROBLEM — M25.78 DEGENERATION OF INTERVERTEBRAL DISC OF THORACIC REGION WITH OSTEOPHYTE: Status: ACTIVE | Noted: 2024-01-16

## 2024-01-16 PROBLEM — R22.43 LOCALIZED SWELLING OF BOTH LOWER LEGS: Status: ACTIVE | Noted: 2023-10-13

## 2024-01-16 PROBLEM — R53.83 FATIGUE: Status: ACTIVE | Noted: 2023-10-13

## 2024-01-16 PROBLEM — S39.92XA INJURY OF BUTTOCK: Status: ACTIVE | Noted: 2023-11-14

## 2024-01-16 PROBLEM — M79.18 PAIN IN BUTTOCK: Status: ACTIVE | Noted: 2023-11-14
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
## 2024-01-16 PROBLEM — H91.92 HEARING LOSS OF LEFT EAR: Status: ACTIVE | Noted: 2024-01-16

## 2024-01-16 PROCEDURE — 1159F MED LIST DOCD IN RCRD: CPT | Performed by: STUDENT IN AN ORGANIZED HEALTH CARE EDUCATION/TRAINING PROGRAM

## 2024-01-16 PROCEDURE — 1160F RVW MEDS BY RX/DR IN RCRD: CPT | Performed by: STUDENT IN AN ORGANIZED HEALTH CARE EDUCATION/TRAINING PROGRAM

## 2024-01-16 PROCEDURE — 1125F AMNT PAIN NOTED PAIN PRSNT: CPT | Performed by: STUDENT IN AN ORGANIZED HEALTH CARE EDUCATION/TRAINING PROGRAM

## 2024-01-16 PROCEDURE — 1036F TOBACCO NON-USER: CPT | Performed by: STUDENT IN AN ORGANIZED HEALTH CARE EDUCATION/TRAINING PROGRAM

## 2024-01-16 PROCEDURE — 73630 X-RAY EXAM OF FOOT: CPT | Mod: RIGHT SIDE | Performed by: RADIOLOGY

## 2024-01-16 PROCEDURE — 3079F DIAST BP 80-89 MM HG: CPT | Performed by: STUDENT IN AN ORGANIZED HEALTH CARE EDUCATION/TRAINING PROGRAM

## 2024-01-16 PROCEDURE — 73630 X-RAY EXAM OF FOOT: CPT | Mod: RT

## 2024-01-16 PROCEDURE — 99214 OFFICE O/P EST MOD 30 MIN: CPT | Performed by: STUDENT IN AN ORGANIZED HEALTH CARE EDUCATION/TRAINING PROGRAM

## 2024-01-16 PROCEDURE — 3074F SYST BP LT 130 MM HG: CPT | Performed by: STUDENT IN AN ORGANIZED HEALTH CARE EDUCATION/TRAINING PROGRAM

## 2024-01-16 ASSESSMENT — ENCOUNTER SYMPTOMS
VOMITING: 0
FEVER: 0
DIARRHEA: 0
NAUSEA: 0
DIZZINESS: 0
DIAPHORESIS: 0
DYSURIA: 0
SHORTNESS OF BREATH: 0
CHILLS: 0
LIGHT-HEADEDNESS: 0

## 2024-01-16 ASSESSMENT — PATIENT HEALTH QUESTIONNAIRE - PHQ9
2. FEELING DOWN, DEPRESSED OR HOPELESS: NOT AT ALL
SUM OF ALL RESPONSES TO PHQ9 QUESTIONS 1 AND 2: 0
1. LITTLE INTEREST OR PLEASURE IN DOING THINGS: NOT AT ALL

## 2024-01-16 NOTE — PROGRESS NOTES
"Subjective   Patient ID: Guero Cope \"Fan\" is a 78 y.o. male who presents for Right foot swelling  (Patient said that this has been occurring for about 3 days now. Patient said that it is also painful and keeping him up at night. ).  He is here for right foot pain. Denies any obvious cause of this pain. Noticed this in middle of night. Pain usually in top part of foot and goes to large toe. There is swelling in right foot but this is better.   Denies having this in the past. He started vyvgart IVIG infusions.    Has been off diuretic after talking with Dr. Roque and today will be the first day it is stopped.     Has tried melatonin 10mg without much benefit. Goes to sleep at midnight and then wakes up at 1:30AM, trouble staying asleep. He is not sure what wakes him up at night. No trouble initiating sleep. Denies PND but has dry mouth at night. Wife notices he snores at night.         Review of Systems   Constitutional:  Negative for chills, diaphoresis and fever.   Respiratory:  Negative for shortness of breath.    Cardiovascular:  Negative for chest pain.   Gastrointestinal:  Negative for diarrhea, nausea and vomiting.   Genitourinary:  Negative for dysuria.   Neurological:  Negative for dizziness and light-headedness.       /87 (BP Location: Left arm, Patient Position: Sitting, BP Cuff Size: Large adult)   Pulse 81   Ht 1.778 m (5' 10\")   Wt 80.7 kg (178 lb)   SpO2 97%   BMI 25.54 kg/m²     Objective   Physical Exam  Vitals reviewed.   Constitutional:       General: He is not in acute distress.     Appearance: Normal appearance.   HENT:      Head: Normocephalic.   Cardiovascular:      Rate and Rhythm: Normal rate and regular rhythm.   Pulmonary:      Effort: Pulmonary effort is normal. No respiratory distress.      Breath sounds: Normal breath sounds.   Abdominal:      General: There is no distension.   Musculoskeletal:         General: No deformity.      Comments: Right dorsal foot swelling, " redness, mild warmth.  Area of redness and swelling approximately 2 x 5 cm in diameter over the dorsal medial surface of the right foot.  Area is tender to palpation.   Skin:     Coloration: Skin is not jaundiced.   Neurological:      Mental Status: He is alert.         Assessment/Plan   Problem List Items Addressed This Visit    None  Visit Diagnoses       Localized swelling of right foot    -  Primary    Relevant Orders    Vascular US lower extremity venous duplex right    XR foot right 3+ views (Completed)    Right foot pain        Relevant Orders    Vascular US lower extremity venous duplex right    XR foot right 3+ views (Completed)    Insomnia, unspecified type        Relevant Orders    Referral to Adult Sleep Medicine        Localized right foot swelling  Right foot pain  - X-ray of the foot to rule out fracture  - Stat lower extremity ultrasound to rule out clot given the symptoms appear different than his symptoms from last month.  - Advised ER for any chest pain, shortness of breath, chest heaviness, worsening symptoms.  - Consider antibiotics if workup negative  - Advised to keep myasthenia gravis team in the loop regarding his foot pain.    Insomnia  - Discussed hesitancy to start sedating medications as likely will not provide restorative sleep.  Discussed possibility of anxiety or sleep apnea playing a role.  - Referred to sleep medicine to establish care.    Follow-up with me as previously scheduled, sooner if needed.

## 2024-01-17 ENCOUNTER — HOSPITAL ENCOUNTER (OUTPATIENT)
Dept: CARDIOLOGY | Facility: HOSPITAL | Age: 79
Discharge: HOME | End: 2024-01-17
Payer: MEDICARE

## 2024-01-17 DIAGNOSIS — R22.41 LOCALIZED SWELLING OF RIGHT FOOT: ICD-10-CM

## 2024-01-17 DIAGNOSIS — M79.671 RIGHT FOOT PAIN: ICD-10-CM

## 2024-01-17 PROCEDURE — 93971 EXTREMITY STUDY: CPT | Performed by: SURGERY

## 2024-01-17 PROCEDURE — 93971 EXTREMITY STUDY: CPT

## 2024-01-18 ENCOUNTER — TELEMEDICINE (OUTPATIENT)
Dept: PHARMACY | Facility: HOSPITAL | Age: 79
End: 2024-01-18
Payer: MEDICARE

## 2024-01-18 ENCOUNTER — PATIENT MESSAGE (OUTPATIENT)
Dept: PRIMARY CARE | Facility: CLINIC | Age: 79
End: 2024-01-18

## 2024-01-18 DIAGNOSIS — R73.9 STEROID-INDUCED HYPERGLYCEMIA: ICD-10-CM

## 2024-01-18 DIAGNOSIS — L03.90 CELLULITIS, UNSPECIFIED CELLULITIS SITE: Primary | ICD-10-CM

## 2024-01-18 DIAGNOSIS — T38.0X5A STEROID-INDUCED HYPERGLYCEMIA: ICD-10-CM

## 2024-01-18 DIAGNOSIS — R73.9 HYPERGLYCEMIA: ICD-10-CM

## 2024-01-18 RX ORDER — CEPHALEXIN 500 MG/1
500 CAPSULE ORAL 2 TIMES DAILY
Qty: 14 CAPSULE | Refills: 0 | Status: SHIPPED | OUTPATIENT
Start: 2024-01-18 | End: 2024-01-25

## 2024-01-18 NOTE — PROGRESS NOTES
"Patient is sent at the request of Madi Sloan DO for my opinion regarding steroid-induced hyperglycemia.  My final recommendations will be communicated back to the requesting provider by way of shared medical record.    Subjective     Allergies   Allergen Reactions    Sulfa (Sulfonamide Antibiotics) Rash     Current monitoring regimen:   Patient is using: glucometer / CGM - Freestyle Donta 3     Connected to office [facility] portal                 Objective     There were no vitals taken for this visit.    Diabetes Pharmacotherapy:   Toujeo Solostar 100 units/mL: Inject 14 units under the skin once daily in the morning   Metformin  mg: Take three tablets daily  Humalog Kwikpen 100 units/mL: Inject as 14-22-14 units TID plus sliding scale   <150 mg/dL - 0 units  151-200 mg/dL - 4 units  201-250 mg/dL - 6 units  251-300 mg/dL - 8 units  301-350 mg/dL - 10 units  351-400 mg/dL - 12 units  >400 mg/dL > 14 units, call MD    Steroid Therapy:   Prednisone 20 mg: Take 2 tablets by mouth once daily    Lab Review  Lab Results   Component Value Date    BILITOT 0.5 12/08/2021    CALCIUM 8.9 12/14/2023    CO2 23 12/14/2023     12/14/2023    CREATININE 1.73 (H) 12/14/2023    GLUCOSE 214 (H) 12/14/2023    ALKPHOS 89 12/08/2021    K 5.2 12/14/2023    PROT 7.1 12/08/2021     12/14/2023    AST 21 12/08/2021    ALT 21 12/08/2021    BUN 35 (H) 12/14/2023    ANIONGAP 17 12/14/2023    ALBUMIN 4.3 12/08/2021     No results found for: \"TRIG\", \"CHOL\", \"LDLCALC\", \"HDL\"    Lab Results   Component Value Date    HGBA1C 3.9 12/14/2023    HGBA1C 6.3 (H) 07/23/2023    HGBA1C 6.8 (H) 05/25/2023     The ASCVD Risk score (Raj DK, et al., 2019) failed to calculate for the following reasons:    The patient has a prior MI or stroke diagnosis    Drug Interactions:  No drug-drug interactions that require change in therapy    Assessment/Plan   Problem List Items Addressed This Visit       Hyperglycemia    Steroid-induced " hyperglycemia     Pharmacotherapy: No changes to medication therapy today.   Continue:   Toujeo Solostar 100 units/mL: Inject 14 units under the skin once daily in the morning with breakfast   Humalog Kwikpen 100 units/mL: Inject as 14-26-14 units TID plus sliding scale   <150 mg/dL - 0 units  151-200 mg/dL - 4 units  201-250 mg/dL - 6 units  251-300 mg/dL - 8 units  301-350 mg/dL - 10 units  351-400 mg/dL - 12 units  >400 mg/dL > 14 units, call MD  Metformin  mg: Take three tablets daily   Metformin properly dosed for current renal function  Donta 3 covered at no copay at [facility] pharmacy through Medicare Part B plan   Patient is on therapy with an intermediate acting steroid - Prednisone 40 mg TDD once daily   Patient was not on baseline pharmacotherapy for diabetes mellitus or prediabetes. Patient does not note a history of anitdiabetic agents.  Compliance at present is estimated to be good.   Follow-up: I recommend diabetes care be  02/01/2024 at 10 am  .    Lior Bowser, PharmD    Continue all meds under the continuation of care with the referring provider and clinical pharmacy team.

## 2024-01-24 ENCOUNTER — OFFICE VISIT (OUTPATIENT)
Dept: WOUND CARE | Facility: CLINIC | Age: 79
End: 2024-01-24
Payer: MEDICARE

## 2024-01-24 PROCEDURE — 99213 OFFICE O/P EST LOW 20 MIN: CPT | Mod: 25

## 2024-02-01 ENCOUNTER — TELEMEDICINE (OUTPATIENT)
Dept: PHARMACY | Facility: HOSPITAL | Age: 79
End: 2024-02-01
Payer: MEDICARE

## 2024-02-01 DIAGNOSIS — T38.0X5A STEROID-INDUCED HYPERGLYCEMIA: ICD-10-CM

## 2024-02-01 DIAGNOSIS — R73.9 HYPERGLYCEMIA: ICD-10-CM

## 2024-02-01 DIAGNOSIS — R73.9 STEROID-INDUCED HYPERGLYCEMIA: ICD-10-CM

## 2024-02-01 NOTE — PROGRESS NOTES
"Patient is sent at the request of Madi Sloan DO for my opinion regarding steroid-induced hyperglycemia.  My final recommendations will be communicated back to the requesting provider by way of shared medical record.    Subjective     Allergies   Allergen Reactions    Sulfa (Sulfonamide Antibiotics) Rash     Current monitoring regimen:   Patient is using: glucometer / CGM - Freestyle Donta 3     Connected to office "[employer] portal                     Objective     There were no vitals taken for this visit.    Diabetes Pharmacotherapy:   Toujeo Solostar 100 units/mL: Inject 14 units under the skin once daily in the morning   Metformin  mg: Take three tablets daily  Humalog Kwikpen 100 units/mL: Inject as 14-22-14 units TID plus sliding scale   <150 mg/dL - 0 units  151-200 mg/dL - 4 units  201-250 mg/dL - 6 units  251-300 mg/dL - 8 units  301-350 mg/dL - 10 units  351-400 mg/dL - 12 units  >400 mg/dL > 14 units, call MD    Steroid Therapy:   Prednisone 20 mg: Take 2 tablets by mouth once daily    Lab Review  Lab Results   Component Value Date    BILITOT 0.5 12/08/2021    CALCIUM 8.9 12/14/2023    CO2 23 12/14/2023     12/14/2023    CREATININE 1.73 (H) 12/14/2023    GLUCOSE 214 (H) 12/14/2023    ALKPHOS 89 12/08/2021    K 5.2 12/14/2023    PROT 7.1 12/08/2021     12/14/2023    AST 21 12/08/2021    ALT 21 12/08/2021    BUN 35 (H) 12/14/2023    ANIONGAP 17 12/14/2023    ALBUMIN 4.3 12/08/2021     No results found for: \"TRIG\", \"CHOL\", \"LDLCALC\", \"HDL\"    Lab Results   Component Value Date    HGBA1C 3.9 12/14/2023    HGBA1C 6.3 (H) 07/23/2023    HGBA1C 6.8 (H) 05/25/2023     The ASCVD Risk score (Raj DK, et al., 2019) failed to calculate for the following reasons:    The patient has a prior MI or stroke diagnosis    Drug Interactions:  No drug-drug interactions that require change in therapy    Assessment/Plan   Problem List Items Addressed This Visit       Hyperglycemia    Steroid-induced " hyperglycemia     Pharmacotherapy: No changes to medication therapy today.   Continue:   Toujeo Solostar 100 units/mL: Inject 14 units under the skin once daily in the morning with breakfast   Humalog Kwikpen 100 units/mL: Inject as 14-26-14 units TID plus sliding scale   <150 mg/dL - 0 units  151-200 mg/dL - 4 units  201-250 mg/dL - 6 units  251-300 mg/dL - 8 units  301-350 mg/dL - 10 units  351-400 mg/dL - 12 units  >400 mg/dL > 14 units, call MD  Metformin  mg: Take three tablets daily   Metformin properly dosed for current renal function  Donta 3 covered at no copay at [facility] pharmacy through Medicare Part B plan   Patient is on therapy with an intermediate acting steroid - Prednisone 40 mg TDD once daily   Patient was not on baseline pharmacotherapy for diabetes mellitus or prediabetes. Patient does not note a history of anitdiabetic agents.  Compliance at present is estimated to be good.     Lior Bowser, PharmD    Continue all meds under the continuation of care with the referring provider and clinical pharmacy team.

## 2024-02-16 ENCOUNTER — PATIENT MESSAGE (OUTPATIENT)
Dept: PRIMARY CARE | Facility: CLINIC | Age: 79
End: 2024-02-16
Payer: MEDICARE

## 2024-02-19 DIAGNOSIS — R73.9 STEROID-INDUCED HYPERGLYCEMIA: ICD-10-CM

## 2024-02-19 DIAGNOSIS — T38.0X5A STEROID-INDUCED HYPERGLYCEMIA: ICD-10-CM

## 2024-02-19 RX ORDER — METFORMIN HYDROCHLORIDE 500 MG/1
1500 TABLET, EXTENDED RELEASE ORAL
Qty: 270 TABLET | Refills: 1 | Status: SHIPPED | OUTPATIENT
Start: 2024-02-19 | End: 2024-08-17

## 2024-03-13 ENCOUNTER — OFFICE VISIT (OUTPATIENT)
Dept: PRIMARY CARE | Facility: CLINIC | Age: 79
End: 2024-03-13
Payer: MEDICARE

## 2024-03-13 ENCOUNTER — HOSPITAL ENCOUNTER (OUTPATIENT)
Dept: RADIOLOGY | Facility: CLINIC | Age: 79
Discharge: HOME | End: 2024-03-13
Payer: MEDICARE

## 2024-03-13 VITALS
WEIGHT: 175 LBS | OXYGEN SATURATION: 98 % | SYSTOLIC BLOOD PRESSURE: 124 MMHG | BODY MASS INDEX: 25.05 KG/M2 | RESPIRATION RATE: 14 BRPM | DIASTOLIC BLOOD PRESSURE: 78 MMHG | HEART RATE: 79 BPM | TEMPERATURE: 96.8 F | HEIGHT: 70 IN

## 2024-03-13 DIAGNOSIS — I25.10 CORONARY ARTERY DISEASE INVOLVING NATIVE CORONARY ARTERY OF NATIVE HEART WITHOUT ANGINA PECTORIS: ICD-10-CM

## 2024-03-13 DIAGNOSIS — S61.209A OPEN WOUND OF FINGER, INITIAL ENCOUNTER: ICD-10-CM

## 2024-03-13 DIAGNOSIS — K44.9 HIATAL HERNIA: ICD-10-CM

## 2024-03-13 DIAGNOSIS — M43.25 FUSION OF SPINE OF THORACOLUMBAR REGION: ICD-10-CM

## 2024-03-13 DIAGNOSIS — S61.209A OPEN WOUND OF FINGER, INITIAL ENCOUNTER: Primary | ICD-10-CM

## 2024-03-13 DIAGNOSIS — C61 PROSTATE CANCER (MULTI): ICD-10-CM

## 2024-03-13 DIAGNOSIS — S81.811A NONINFECTED SKIN TEAR OF RIGHT LOWER EXTREMITY, INITIAL ENCOUNTER: ICD-10-CM

## 2024-03-13 DIAGNOSIS — N28.1 BILATERAL RENAL CYSTS: ICD-10-CM

## 2024-03-13 DIAGNOSIS — T38.0X5A STEROID-INDUCED HYPERGLYCEMIA: ICD-10-CM

## 2024-03-13 DIAGNOSIS — E78.2 MIXED HYPERLIPIDEMIA: ICD-10-CM

## 2024-03-13 DIAGNOSIS — I10 PRIMARY HYPERTENSION: ICD-10-CM

## 2024-03-13 DIAGNOSIS — R73.9 STEROID-INDUCED HYPERGLYCEMIA: ICD-10-CM

## 2024-03-13 DIAGNOSIS — N20.0 KIDNEY STONES: ICD-10-CM

## 2024-03-13 DIAGNOSIS — G70.00 MYASTHENIA GRAVIS (MULTI): ICD-10-CM

## 2024-03-13 DIAGNOSIS — N18.2 CKD (CHRONIC KIDNEY DISEASE) STAGE 2, GFR 60-89 ML/MIN: Chronic | ICD-10-CM

## 2024-03-13 DIAGNOSIS — R60.0 BILATERAL LOWER EXTREMITY EDEMA: ICD-10-CM

## 2024-03-13 PROCEDURE — 1160F RVW MEDS BY RX/DR IN RCRD: CPT | Performed by: STUDENT IN AN ORGANIZED HEALTH CARE EDUCATION/TRAINING PROGRAM

## 2024-03-13 PROCEDURE — 3074F SYST BP LT 130 MM HG: CPT | Performed by: STUDENT IN AN ORGANIZED HEALTH CARE EDUCATION/TRAINING PROGRAM

## 2024-03-13 PROCEDURE — 1036F TOBACCO NON-USER: CPT | Performed by: STUDENT IN AN ORGANIZED HEALTH CARE EDUCATION/TRAINING PROGRAM

## 2024-03-13 PROCEDURE — 73140 X-RAY EXAM OF FINGER(S): CPT | Mod: LT

## 2024-03-13 PROCEDURE — 99214 OFFICE O/P EST MOD 30 MIN: CPT | Performed by: STUDENT IN AN ORGANIZED HEALTH CARE EDUCATION/TRAINING PROGRAM

## 2024-03-13 PROCEDURE — 1157F ADVNC CARE PLAN IN RCRD: CPT | Performed by: STUDENT IN AN ORGANIZED HEALTH CARE EDUCATION/TRAINING PROGRAM

## 2024-03-13 PROCEDURE — 73140 X-RAY EXAM OF FINGER(S): CPT | Mod: LEFT SIDE | Performed by: RADIOLOGY

## 2024-03-13 PROCEDURE — 3078F DIAST BP <80 MM HG: CPT | Performed by: STUDENT IN AN ORGANIZED HEALTH CARE EDUCATION/TRAINING PROGRAM

## 2024-03-13 PROCEDURE — 1159F MED LIST DOCD IN RCRD: CPT | Performed by: STUDENT IN AN ORGANIZED HEALTH CARE EDUCATION/TRAINING PROGRAM

## 2024-03-13 ASSESSMENT — ENCOUNTER SYMPTOMS
NAUSEA: 0
DIAPHORESIS: 0
FEVER: 0
DYSURIA: 0
LIGHT-HEADEDNESS: 0
DIZZINESS: 0
CHILLS: 0
VOMITING: 0
DIARRHEA: 0
SHORTNESS OF BREATH: 0

## 2024-03-13 NOTE — PROGRESS NOTES
"Subjective   Patient ID: Guero Cope \"Fan\" is a 78 y.o. male who presents for Follow-up and Wound Check (X3 weeks pt has c.o a wound on his left index finger that he would like checked. /Pt also has a round on his right lower leg that he would like checked. ).  HPI  Has wound on left index finger after scraping into a wooden drawer. It has a scab over it but unchanged in size. Using neosporin for last week. No discharge. Has moderate tenderness to palpation. Moving hand ok with out issue.     On right leg he has a wound that developed in middle of night. He denies any injury to the leg. Denies any leg or ankle swelling. This has been present for about a week.          Review of Systems   Constitutional:  Negative for chills, diaphoresis and fever.   Respiratory:  Negative for shortness of breath.    Cardiovascular:  Negative for chest pain.   Gastrointestinal:  Negative for diarrhea, nausea and vomiting.   Genitourinary:  Negative for dysuria.   Neurological:  Negative for dizziness and light-headedness.       /78   Pulse 79   Temp 36 °C (96.8 °F) (Temporal)   Resp 14   Ht 1.778 m (5' 10\")   Wt 79.4 kg (175 lb)   SpO2 98%   BMI 25.11 kg/m²     Objective   Physical Exam  Vitals reviewed.   Constitutional:       General: He is not in acute distress.     Appearance: Normal appearance.   HENT:      Head: Normocephalic.   Cardiovascular:      Rate and Rhythm: Normal rate and regular rhythm.   Pulmonary:      Effort: Pulmonary effort is normal. No respiratory distress.      Breath sounds: Normal breath sounds.   Abdominal:      General: There is no distension.   Musculoskeletal:         General: No deformity.   Skin:     Coloration: Skin is not jaundiced.      Comments: RLE skin tear present, no crepitus or discharge. Left index finger wound present about 1-2 cm in diameter with erythematous base, no discharge or crepitus.    Neurological:      Mental Status: He is alert.         Assessment/Plan   Problem " List Items Addressed This Visit       CKD (chronic kidney disease) stage 2, GFR 60-89 ml/min (Chronic)    Relevant Orders    CBC and Auto Differential    Comprehensive metabolic panel    Kidney stones    Prostate cancer (CMS/HCC)    Primary hypertension    Mixed hyperlipidemia    Coronary artery disease involving native coronary artery of native heart without angina pectoris    Fusion of spine of thoracolumbar region    Steroid-induced hyperglycemia    Bilateral lower extremity edema    Bilateral renal cysts    Noninfected skin tear of right leg    Open wnd of finger - Primary    Relevant Orders    XR fingers left 2+ views    CBC and Auto Differential    Comprehensive metabolic panel    XR fingers left 2+ views (Completed)    Myasthenia gravis (CMS/HCC)    Hiatal hernia       RLE skin tear  -Refer to wound center    Left index finger wound  -Stop neosporin  -Xray ordered  -Referred to wound center  -Labs ordered    Myasthenia gravis  -Multiple admissions at [facility]  -Interval history per Dr. Caicedo with neurology: He was admitted at  from 8/27/23 to 9/9/23 for MG flare, presented with worsening of speech(losing his voice). He tried IVIG (2 doses) without benefit, had worsening of secretions and hypoxia needing intubation(x5 days). Had 5 cycles of PLEX (8/31 to 9/11/23) which helped, started on mycophenolate (500 mg bid).   -S/p removal of right sided TDC  -  Per neuro weaning prednisone on CellCept 1 g twice daily.  He is on vygart for 2 months while reducing prednisone.  -Continue f/u neurology     Lower extremity edema  -Connected with his cardiologist Dr. Roque  -Was diuresed and repeat echo showed EF 55%, repeat in 1-2 years.  -Much improved.      Left sided hearing loss  -Referred to audiology and ENT. Resolved with 2 days of benadryl. Had outside hearing test done and told needs hearing aids.   -Resolved     Right buttock hematoma-resolved  -Reports this resolved.      Status post lumbar fusion  -Continue  follow-up per Naz Murphy and Dr. Main, plan per them.    -Saw 1/22/24 and will see 5 months with xrays.      Primary hypertension  -Continue home losartan 100 mg a day, amlodipine 10 mg a day, follow-up with cardiology.  -Controlled today     Coronary artery disease  Hyperlipidemia  -Continue management per cardiology including rosuvastatin 20 mg a day, aspirin 81 mg a day.   -Will see Dr. Roque     Kidney stones  -On allopurinol  -Reordered uric acid level to monitor      Renal cyst  -Seen on imaging in hospitalization, advise f/u with urology     Prostate cancer  - Continue follow-up with Dr. Fay.  Flomax, Myrbetriq, and further management per urology.     Stage IIIa chronic kidney disease  - CKD noted in EMR, GFR is 37 as of most recent  -Repeat BMP ordered     Hyperglycemia  -Continue follow-up with [facility] pharmacy staff.  -Seeing endocrinology today, Dr. Greene  - On insulin glargine 16 units a day, Humalog with sliding scale, metformin     Insomnia  -Better, defers sleep med referral.     Cuadra's esophagus  Hiatal hernia  -on dexilant  -Hiatal hernia seen on CT chest 7/23  -Reflux controlled.      Normocytic anemia  -Stable since discharge around 9-9.5. On cellcept.  -Repeat CBC      Mild hepatic steatosis seen on on U/s abdomen and spleen. Referred to hepatology previously.        Health Maintenance  -Prostate Cancer Screening: Via urology  -Vaccinations: Reports UTD pneumonia vaccine last winter, flu vaccine, shingles, covid vaccination and booster, UTD TDAP 2026  -Lung Cancer Screening: Not indicated due to age  -AAA Screening: Not inducated due to age  -Colonoscopy: 2025    3 month f/u  Sooner PRN

## 2024-03-14 ENCOUNTER — OFFICE VISIT (OUTPATIENT)
Dept: WOUND CARE | Facility: CLINIC | Age: 79
End: 2024-03-14
Payer: MEDICARE

## 2024-03-14 PROCEDURE — 99213 OFFICE O/P EST LOW 20 MIN: CPT | Mod: 25

## 2024-03-14 PROCEDURE — 11042 DBRDMT SUBQ TIS 1ST 20SQCM/<: CPT

## 2024-03-21 ENCOUNTER — APPOINTMENT (OUTPATIENT)
Dept: SLEEP MEDICINE | Facility: CLINIC | Age: 79
End: 2024-03-21
Payer: MEDICARE

## 2024-03-21 ENCOUNTER — OFFICE VISIT (OUTPATIENT)
Dept: WOUND CARE | Facility: CLINIC | Age: 79
End: 2024-03-21
Payer: MEDICARE

## 2024-03-21 PROCEDURE — 11042 DBRDMT SUBQ TIS 1ST 20SQCM/<: CPT

## 2024-03-23 PROBLEM — S81.811A NONINFECTED SKIN TEAR OF RIGHT LEG: Status: ACTIVE | Noted: 2024-03-23

## 2024-03-23 PROBLEM — S61.209A OPEN WND OF FINGER: Status: ACTIVE | Noted: 2024-03-23

## 2024-03-23 PROBLEM — K44.9 HIATAL HERNIA: Status: ACTIVE | Noted: 2024-03-23

## 2024-03-23 PROBLEM — G70.00 MYASTHENIA GRAVIS (MULTI): Status: ACTIVE | Noted: 2024-03-23

## 2024-03-25 ENCOUNTER — LAB (OUTPATIENT)
Dept: LAB | Facility: LAB | Age: 79
End: 2024-03-25
Payer: MEDICARE

## 2024-03-25 DIAGNOSIS — E11.9 TYPE 2 DIABETES MELLITUS WITHOUT COMPLICATION, WITHOUT LONG-TERM CURRENT USE OF INSULIN (MULTI): ICD-10-CM

## 2024-03-25 DIAGNOSIS — R73.9 HYPERGLYCEMIA: ICD-10-CM

## 2024-03-25 DIAGNOSIS — N13.2 URETERAL STONE WITH HYDRONEPHROSIS: ICD-10-CM

## 2024-03-25 DIAGNOSIS — N18.2 CKD (CHRONIC KIDNEY DISEASE) STAGE 2, GFR 60-89 ML/MIN: Chronic | ICD-10-CM

## 2024-03-25 DIAGNOSIS — N20.0 KIDNEY STONES: ICD-10-CM

## 2024-03-25 DIAGNOSIS — Z00.00 ROUTINE HEALTH MAINTENANCE: ICD-10-CM

## 2024-03-25 DIAGNOSIS — Z00.00 HEALTH CARE MAINTENANCE: ICD-10-CM

## 2024-03-25 DIAGNOSIS — S61.209A OPEN WOUND OF FINGER, INITIAL ENCOUNTER: ICD-10-CM

## 2024-03-25 DIAGNOSIS — E03.8 SUBCLINICAL HYPOTHYROIDISM: ICD-10-CM

## 2024-03-25 DIAGNOSIS — D64.9 NORMOCYTIC ANEMIA: ICD-10-CM

## 2024-03-25 DIAGNOSIS — R79.9 ELEVATED BUN: ICD-10-CM

## 2024-03-25 LAB
ALBUMIN SERPL BCP-MCNC: 4.4 G/DL (ref 3.4–5)
ALP SERPL-CCNC: 37 U/L (ref 33–136)
ALT SERPL W P-5'-P-CCNC: 14 U/L (ref 10–52)
ANION GAP SERPL CALC-SCNC: 18 MMOL/L (ref 10–20)
AST SERPL W P-5'-P-CCNC: 15 U/L (ref 9–39)
BASOPHILS # BLD AUTO: 0.02 X10*3/UL (ref 0–0.1)
BASOPHILS NFR BLD AUTO: 0.3 %
BILIRUB SERPL-MCNC: 0.5 MG/DL (ref 0–1.2)
BUN SERPL-MCNC: 32 MG/DL (ref 6–23)
CALCIUM SERPL-MCNC: 9.9 MG/DL (ref 8.6–10.6)
CHLORIDE SERPL-SCNC: 105 MMOL/L (ref 98–107)
CO2 SERPL-SCNC: 22 MMOL/L (ref 21–32)
CREAT SERPL-MCNC: 1.48 MG/DL (ref 0.5–1.3)
EGFRCR SERPLBLD CKD-EPI 2021: 48 ML/MIN/1.73M*2
EOSINOPHIL # BLD AUTO: 0.01 X10*3/UL (ref 0–0.4)
EOSINOPHIL NFR BLD AUTO: 0.1 %
ERYTHROCYTE [DISTWIDTH] IN BLOOD BY AUTOMATED COUNT: 16.1 % (ref 11.5–14.5)
GLUCOSE SERPL-MCNC: 136 MG/DL (ref 74–99)
HCT VFR BLD AUTO: 37.3 % (ref 41–52)
HGB BLD-MCNC: 11.4 G/DL (ref 13.5–17.5)
IMM GRANULOCYTES # BLD AUTO: 0.18 X10*3/UL (ref 0–0.5)
IMM GRANULOCYTES NFR BLD AUTO: 2.5 % (ref 0–0.9)
LYMPHOCYTES # BLD AUTO: 0.63 X10*3/UL (ref 0.8–3)
LYMPHOCYTES NFR BLD AUTO: 8.6 %
MCH RBC QN AUTO: 26.9 PG (ref 26–34)
MCHC RBC AUTO-ENTMCNC: 30.6 G/DL (ref 32–36)
MCV RBC AUTO: 88 FL (ref 80–100)
MONOCYTES # BLD AUTO: 0.31 X10*3/UL (ref 0.05–0.8)
MONOCYTES NFR BLD AUTO: 4.3 %
NEUTROPHILS # BLD AUTO: 6.14 X10*3/UL (ref 1.6–5.5)
NEUTROPHILS NFR BLD AUTO: 84.2 %
NRBC BLD-RTO: 0 /100 WBCS (ref 0–0)
PLATELET # BLD AUTO: 136 X10*3/UL (ref 150–450)
POTASSIUM SERPL-SCNC: 5.2 MMOL/L (ref 3.5–5.3)
PROT SERPL-MCNC: 6.3 G/DL (ref 6.4–8.2)
RBC # BLD AUTO: 4.24 X10*6/UL (ref 4.5–5.9)
SODIUM SERPL-SCNC: 140 MMOL/L (ref 136–145)
TSH SERPL-ACNC: 2.06 MIU/L (ref 0.44–3.98)
URATE SERPL-MCNC: 4.7 MG/DL (ref 4–7.5)
WBC # BLD AUTO: 7.3 X10*3/UL (ref 4.4–11.3)

## 2024-03-25 PROCEDURE — 80053 COMPREHEN METABOLIC PANEL: CPT

## 2024-03-25 PROCEDURE — 85025 COMPLETE CBC W/AUTO DIFF WBC: CPT

## 2024-03-25 PROCEDURE — 36415 COLL VENOUS BLD VENIPUNCTURE: CPT

## 2024-03-25 PROCEDURE — 83036 HEMOGLOBIN GLYCOSYLATED A1C: CPT

## 2024-03-25 PROCEDURE — 84550 ASSAY OF BLOOD/URIC ACID: CPT

## 2024-03-25 PROCEDURE — 84443 ASSAY THYROID STIM HORMONE: CPT

## 2024-03-26 LAB
EST. AVERAGE GLUCOSE BLD GHB EST-MCNC: 143 MG/DL
HBA1C MFR BLD: 6.6 %

## 2024-03-27 DIAGNOSIS — Z00.00 ROUTINE HEALTH MAINTENANCE: Primary | ICD-10-CM

## 2024-03-27 DIAGNOSIS — D64.9 ANEMIA, UNSPECIFIED TYPE: ICD-10-CM

## 2024-03-28 ENCOUNTER — OFFICE VISIT (OUTPATIENT)
Dept: WOUND CARE | Facility: CLINIC | Age: 79
End: 2024-03-28
Payer: MEDICARE

## 2024-03-28 PROCEDURE — 11042 DBRDMT SUBQ TIS 1ST 20SQCM/<: CPT

## 2024-03-29 ENCOUNTER — OFFICE VISIT (OUTPATIENT)
Dept: PRIMARY CARE | Facility: CLINIC | Age: 79
End: 2024-03-29
Payer: MEDICARE

## 2024-03-29 VITALS
OXYGEN SATURATION: 98 % | SYSTOLIC BLOOD PRESSURE: 142 MMHG | BODY MASS INDEX: 25.11 KG/M2 | TEMPERATURE: 98 F | RESPIRATION RATE: 16 BRPM | HEART RATE: 124 BPM | DIASTOLIC BLOOD PRESSURE: 80 MMHG | WEIGHT: 175 LBS

## 2024-03-29 DIAGNOSIS — I25.10 CORONARY ARTERY DISEASE INVOLVING NATIVE CORONARY ARTERY OF NATIVE HEART WITHOUT ANGINA PECTORIS: ICD-10-CM

## 2024-03-29 DIAGNOSIS — I10 PRIMARY HYPERTENSION: ICD-10-CM

## 2024-03-29 DIAGNOSIS — R00.0 TACHYCARDIA: Primary | ICD-10-CM

## 2024-03-29 DIAGNOSIS — S05.11XA CONTUSION OF RIGHT ORBITAL TISSUES, INITIAL ENCOUNTER: ICD-10-CM

## 2024-03-29 DIAGNOSIS — E11.9 DIABETES MELLITUS WITHOUT COMPLICATION (MULTI): ICD-10-CM

## 2024-03-29 DIAGNOSIS — G70.00 MYASTHENIA GRAVIS (MULTI): ICD-10-CM

## 2024-03-29 PROBLEM — G70.01: Status: RESOLVED | Noted: 2023-07-31 | Resolved: 2024-03-29

## 2024-03-29 PROBLEM — Z79.52 IMMUNOSUPPRESSION DUE TO CHRONIC STEROID USE (MULTI): Status: ACTIVE | Noted: 2024-03-29

## 2024-03-29 PROBLEM — I48.91 ATRIAL FIBRILLATION WITH RVR (MULTI): Status: ACTIVE | Noted: 2024-03-29

## 2024-03-29 PROBLEM — I73.9 PAD (PERIPHERAL ARTERY DISEASE) (CMS-HCC): Status: ACTIVE | Noted: 2024-03-29

## 2024-03-29 PROBLEM — Z99.11 ON MECHANICALLY ASSISTED VENTILATION (MULTI): Status: ACTIVE | Noted: 2024-03-29

## 2024-03-29 PROBLEM — T38.0X5A IMMUNOSUPPRESSION DUE TO CHRONIC STEROID USE (MULTI): Status: ACTIVE | Noted: 2024-03-29

## 2024-03-29 PROBLEM — D84.821 IMMUNOSUPPRESSION DUE TO CHRONIC STEROID USE (MULTI): Status: ACTIVE | Noted: 2024-03-29

## 2024-03-29 PROCEDURE — 99214 OFFICE O/P EST MOD 30 MIN: CPT | Performed by: INTERNAL MEDICINE

## 2024-03-29 PROCEDURE — 93000 ELECTROCARDIOGRAM COMPLETE: CPT | Performed by: INTERNAL MEDICINE

## 2024-03-29 PROCEDURE — 1160F RVW MEDS BY RX/DR IN RCRD: CPT | Performed by: INTERNAL MEDICINE

## 2024-03-29 PROCEDURE — 1157F ADVNC CARE PLAN IN RCRD: CPT | Performed by: INTERNAL MEDICINE

## 2024-03-29 PROCEDURE — 1036F TOBACCO NON-USER: CPT | Performed by: INTERNAL MEDICINE

## 2024-03-29 PROCEDURE — 1159F MED LIST DOCD IN RCRD: CPT | Performed by: INTERNAL MEDICINE

## 2024-03-29 PROCEDURE — 3077F SYST BP >= 140 MM HG: CPT | Performed by: INTERNAL MEDICINE

## 2024-03-29 PROCEDURE — 3079F DIAST BP 80-89 MM HG: CPT | Performed by: INTERNAL MEDICINE

## 2024-03-29 ASSESSMENT — PATIENT HEALTH QUESTIONNAIRE - PHQ9
SUM OF ALL RESPONSES TO PHQ9 QUESTIONS 1 AND 2: 0
2. FEELING DOWN, DEPRESSED OR HOPELESS: NOT AT ALL
2. FEELING DOWN, DEPRESSED OR HOPELESS: NOT AT ALL
SUM OF ALL RESPONSES TO PHQ9 QUESTIONS 1 AND 2: 0
1. LITTLE INTEREST OR PLEASURE IN DOING THINGS: NOT AT ALL
1. LITTLE INTEREST OR PLEASURE IN DOING THINGS: NOT AT ALL

## 2024-03-29 ASSESSMENT — ENCOUNTER SYMPTOMS
PALPITATIONS: 0
SORE THROAT: 0
NECK STIFFNESS: 0
ALLERGIC/IMMUNOLOGIC NEGATIVE: 1
SEIZURES: 0
POLYDIPSIA: 0
HEADACHES: 0
TROUBLE SWALLOWING: 0
PSYCHIATRIC NEGATIVE: 1
COLOR CHANGE: 1
EYE DISCHARGE: 0
MUSCULOSKELETAL NEGATIVE: 1
BACK PAIN: 0
DYSURIA: 0
AGITATION: 0
ACTIVITY CHANGE: 0
DIZZINESS: 0
NECK PAIN: 0
VOMITING: 0
WHEEZING: 0
HALLUCINATIONS: 0
COUGH: 0
GASTROINTESTINAL NEGATIVE: 1
RESPIRATORY NEGATIVE: 1
CONSTITUTIONAL NEGATIVE: 1
STRIDOR: 0
FLANK PAIN: 0
CHEST TIGHTNESS: 0
HEMATOLOGIC/LYMPHATIC NEGATIVE: 1
BRUISES/BLEEDS EASILY: 0
SINUS PAIN: 0
APPETITE CHANGE: 0
CARDIOVASCULAR NEGATIVE: 1
VOICE CHANGE: 0
ABDOMINAL PAIN: 0
NERVOUS/ANXIOUS: 0
DIARRHEA: 0
ADENOPATHY: 0
LIGHT-HEADEDNESS: 0
FREQUENCY: 0
POLYPHAGIA: 0
SINUS PRESSURE: 0
WEAKNESS: 0
SHORTNESS OF BREATH: 0
TREMORS: 0
SLEEP DISTURBANCE: 0
WOUND: 0
EYES NEGATIVE: 1
UNEXPECTED WEIGHT CHANGE: 0
DIFFICULTY URINATING: 0
EYE REDNESS: 0
EYE PAIN: 0
NUMBNESS: 0
FACIAL ASYMMETRY: 0
NEUROLOGICAL NEGATIVE: 1
BLOOD IN STOOL: 0
NAUSEA: 0
SPEECH DIFFICULTY: 0
JOINT SWELLING: 0
ARTHRALGIAS: 0
ENDOCRINE NEGATIVE: 1
CONFUSION: 0
MYALGIAS: 0
CONSTIPATION: 0

## 2024-03-29 NOTE — ASSESSMENT & PLAN NOTE
Obtain CT scan head STAT as discussed (scheduled for tomorrow at 2 pm). Go to ER if having any new neurological symptoms.

## 2024-03-29 NOTE — PROGRESS NOTES
"Subjective   Patient ID: Guero Cope \"Colby" is a 78 y.o. male who presents for Fall (Patient is here today due to a fall at home last week and hit his head, may need xray order Dr. Sloan Patient).  Fall  Pertinent negatives include no abdominal pain, headaches, nausea, numbness or vomiting.       77 yo male, patient of Dr. Sloan with PMH:  MG, DM, HTN, CAD, prostate ca, CAD, vertebral artery stenosis who comes for visit today after he accidentally fell at home a week ago.   His dog pulled him and he lost his balance, landed on carpet, sustained right sided face contusion.  Significant swelling still present around right orbital area, superficial skin abrasion (in process of healing) with bruising extending to lower aspect of right side of face. He has been taking Aspirin 81 mg daily.   Last Td vaccine: 2016  Vision not affected, did not notice any new focal neurological deficits.     Fast, irregular heart rhythm noted on exam today.   Patient follows with cardiologist Dr. Roque,  last visit 2 months ago. Spironolactone added, Lasix increased.     Patient has been following with wound care regarding previous wound on right leg, no new wounds as result of recent fall.  He has been ambulating as before.    Review of Systems   Constitutional: Negative.  Negative for activity change, appetite change and unexpected weight change.   HENT: Negative.  Negative for congestion, ear discharge, ear pain, hearing loss, mouth sores, nosebleeds, sinus pressure, sinus pain, sore throat, trouble swallowing and voice change.    Eyes: Negative.  Negative for pain, discharge, redness and visual disturbance.   Respiratory: Negative.  Negative for cough, chest tightness, shortness of breath, wheezing and stridor.    Cardiovascular: Negative.  Negative for chest pain, palpitations and leg swelling.   Gastrointestinal: Negative.  Negative for abdominal pain, blood in stool, constipation, diarrhea, nausea and vomiting.   Endocrine: " Negative.  Negative for polydipsia, polyphagia and polyuria.   Genitourinary: Negative.  Negative for difficulty urinating, dysuria, flank pain, frequency and urgency.   Musculoskeletal: Negative.  Negative for arthralgias, back pain, gait problem, joint swelling, myalgias, neck pain and neck stiffness.   Skin:  Positive for color change. Negative for rash and wound.   Allergic/Immunologic: Negative.  Negative for environmental allergies, food allergies and immunocompromised state.   Neurological: Negative.  Negative for dizziness, tremors, seizures, syncope, facial asymmetry, speech difficulty, weakness, light-headedness, numbness and headaches.   Hematological: Negative.  Negative for adenopathy. Does not bruise/bleed easily.   Psychiatric/Behavioral: Negative.  Negative for agitation, behavioral problems, confusion, hallucinations, sleep disturbance and suicidal ideas. The patient is not nervous/anxious.    All other systems reviewed and are negative.      Objective     Review of systems was performed and all systems were negative except what in HPI    /80 (BP Location: Left arm, Patient Position: Sitting, BP Cuff Size: Adult)   Pulse (!) 124   Temp 36.7 °C (98 °F) (Temporal)   Resp 16   Wt 79.4 kg (175 lb)   SpO2 98%   BMI 25.11 kg/m²    Physical Exam  Vitals and nursing note reviewed.   Constitutional:       General: He is not in acute distress.     Appearance: Normal appearance.   HENT:      Head: Normocephalic and atraumatic.      Right Ear: Tympanic membrane, ear canal and external ear normal.      Left Ear: Tympanic membrane, ear canal and external ear normal.      Nose: Nose normal. No congestion or rhinorrhea.      Mouth/Throat:      Mouth: Mucous membranes are moist.      Pharynx: Oropharynx is clear.   Eyes:      General:         Right eye: No discharge.         Left eye: No discharge.      Extraocular Movements: Extraocular movements intact.      Conjunctiva/sclera: Conjunctivae normal.       Pupils: Pupils are equal, round, and reactive to light.   Cardiovascular:      Rate and Rhythm: Normal rate and regular rhythm.      Pulses: Normal pulses.      Heart sounds: Normal heart sounds. No murmur heard.     No friction rub. No gallop.   Pulmonary:      Effort: Pulmonary effort is normal. No respiratory distress.      Breath sounds: Normal breath sounds. No stridor. No wheezing, rhonchi or rales.   Chest:      Chest wall: No tenderness.   Abdominal:      General: Bowel sounds are normal.      Palpations: Abdomen is soft. There is no mass.      Tenderness: There is no abdominal tenderness. There is no guarding or rebound.   Musculoskeletal:         General: No swelling or deformity. Normal range of motion.      Cervical back: Normal range of motion and neck supple. No rigidity or tenderness.      Right lower leg: No edema.      Left lower leg: No edema.   Lymphadenopathy:      Cervical: No cervical adenopathy.   Skin:     General: Skin is warm and dry.      Coloration: Skin is not jaundiced.      Findings: Bruising present. No erythema.      Comments: Swelling and bruising noted in right orbital area.  Superficial skin abrasion in process of healing.  Bruise is extending to lower aspect of right side of face.   Neurological:      General: No focal deficit present.      Mental Status: He is alert and oriented to person, place, and time. Mental status is at baseline.      Cranial Nerves: No cranial nerve deficit.      Motor: No weakness.      Coordination: Coordination normal.      Gait: Gait normal.   Psychiatric:         Mood and Affect: Mood normal.         Behavior: Behavior normal.         Thought Content: Thought content normal.         Judgment: Judgment normal.         Assessment/Plan   Problem List Items Addressed This Visit             ICD-10-CM       Cardiac and Vasculature    Primary hypertension I10     Continue current medications.         Coronary artery disease involving native coronary artery  of native heart without angina pectoris I25.10     Clinically stable. F/up with cardiology Dr. Roque.         Tachycardia - Primary R00.0     Sinus tachycardia with sinus arrhythmia. F/up with cardiology.          Relevant Orders    ECG 12 lead (Clinic Performed) (Completed)       Endocrine/Metabolic    Diabetes mellitus without complication (CMS/[facility]) E11.9     Clinically stable. Continue current treatment.             Eye    Contusion of right orbital tissues S05.11XA     Obtain CT scan head STAT as discussed (scheduled for tomorrow at 2 pm). Go to ER if having any new neurological symptoms.          Relevant Orders    CT head wo IV contrast       Neuro    Myasthenia gravis (CMS/[facility]) G70.00     Clinically stable. F/up with neurology.           It was a pleasure to see you today.  I would like to remind you about importance of a healthy lifestyle in order to improve your well-being and live longer.  Try to engage in physical activities for at least 150 minutes per week.  Eat about 10 servings of fruits and vegetables daily. My advice is 2 servings of fruits and 8 servings of vegetables.  For vegetables choose at least half of them green and at least half of them fresh.  Please avoid sugar, salt, fried food and saturated fat.  I spent a total of 30 minutes on the date of service for follow up visit, which included preparing to see the patient, face-to-face patient care, completing clinical documentation, obtaining and/or reviewing separately obtained history, performing a medically appropriate examination, counseling and educating the patient/family/caregiver, ordering medications, tests, or procedures, communicating with other health care providers (not separately reported), independently interpreting results (not separately reported), communicating results to the patient/family/caregiver, and care coordination (not separately reported).    F/up with Dr. Sloan in 1 month or sooner if needed.

## 2024-03-30 ENCOUNTER — HOSPITAL ENCOUNTER (OUTPATIENT)
Dept: RADIOLOGY | Facility: HOSPITAL | Age: 79
Discharge: HOME | End: 2024-03-30
Payer: MEDICARE

## 2024-03-30 DIAGNOSIS — S05.11XA CONTUSION OF RIGHT ORBITAL TISSUES, INITIAL ENCOUNTER: ICD-10-CM

## 2024-03-30 PROCEDURE — 70450 CT HEAD/BRAIN W/O DYE: CPT

## 2024-03-30 PROCEDURE — 70450 CT HEAD/BRAIN W/O DYE: CPT | Performed by: RADIOLOGY

## 2024-04-04 ENCOUNTER — OFFICE VISIT (OUTPATIENT)
Dept: WOUND CARE | Facility: CLINIC | Age: 79
End: 2024-04-04
Payer: MEDICARE

## 2024-04-04 ENCOUNTER — LAB REQUISITION (OUTPATIENT)
Dept: LAB | Facility: HOSPITAL | Age: 79
End: 2024-04-04
Payer: MEDICARE

## 2024-04-04 DIAGNOSIS — S61.211A LACERATION WITHOUT FOREIGN BODY OF LEFT INDEX FINGER WITHOUT DAMAGE TO NAIL, INITIAL ENCOUNTER: ICD-10-CM

## 2024-04-04 PROCEDURE — 87070 CULTURE OTHR SPECIMN AEROBIC: CPT

## 2024-04-04 PROCEDURE — 87186 SC STD MICRODIL/AGAR DIL: CPT

## 2024-04-04 PROCEDURE — 87075 CULTR BACTERIA EXCEPT BLOOD: CPT

## 2024-04-04 PROCEDURE — 11042 DBRDMT SUBQ TIS 1ST 20SQCM/<: CPT

## 2024-04-04 PROCEDURE — 87205 SMEAR GRAM STAIN: CPT

## 2024-04-07 LAB
BACTERIA SPEC CULT: ABNORMAL
BACTERIA SPEC CULT: ABNORMAL
GRAM STN SPEC: ABNORMAL
GRAM STN SPEC: ABNORMAL

## 2024-04-09 ENCOUNTER — OFFICE VISIT (OUTPATIENT)
Dept: WOUND CARE | Facility: CLINIC | Age: 79
End: 2024-04-09
Payer: MEDICARE

## 2024-04-09 PROCEDURE — 99212 OFFICE O/P EST SF 10 MIN: CPT

## 2024-04-15 ENCOUNTER — LAB (OUTPATIENT)
Dept: LAB | Facility: LAB | Age: 79
End: 2024-04-15
Payer: MEDICARE

## 2024-04-15 ENCOUNTER — TELEPHONE (OUTPATIENT)
Dept: PHARMACY | Facility: HOSPITAL | Age: 79
End: 2024-04-15

## 2024-04-15 DIAGNOSIS — T38.0X5A STEROID-INDUCED HYPERGLYCEMIA: Primary | ICD-10-CM

## 2024-04-15 DIAGNOSIS — R73.9 HYPERGLYCEMIA: ICD-10-CM

## 2024-04-15 DIAGNOSIS — R73.9 STEROID-INDUCED HYPERGLYCEMIA: Primary | ICD-10-CM

## 2024-04-15 DIAGNOSIS — D64.9 ANEMIA, UNSPECIFIED TYPE: ICD-10-CM

## 2024-04-15 LAB
ERYTHROCYTE [DISTWIDTH] IN BLOOD BY AUTOMATED COUNT: 17.2 % (ref 11.5–14.5)
HCT VFR BLD AUTO: 36.4 % (ref 41–52)
HGB BLD-MCNC: 11.3 G/DL (ref 13.5–17.5)
MCH RBC QN AUTO: 27.4 PG (ref 26–34)
MCHC RBC AUTO-ENTMCNC: 31 G/DL (ref 32–36)
MCV RBC AUTO: 88 FL (ref 80–100)
NRBC BLD-RTO: 0 /100 WBCS (ref 0–0)
PLATELET # BLD AUTO: 174 X10*3/UL (ref 150–450)
RBC # BLD AUTO: 4.13 X10*6/UL (ref 4.5–5.9)
WBC # BLD AUTO: 9.9 X10*3/UL (ref 4.4–11.3)

## 2024-04-15 PROCEDURE — 85027 COMPLETE CBC AUTOMATED: CPT

## 2024-04-15 PROCEDURE — 36415 COLL VENOUS BLD VENIPUNCTURE: CPT

## 2024-04-15 NOTE — TELEPHONE ENCOUNTER
Updated patient appointment scheduled. Called received from patient's local pharmacy for new prescription of his short acting insulin. We will discuss alternative therapy as appropriate.

## 2024-04-16 ENCOUNTER — TELEMEDICINE (OUTPATIENT)
Dept: PHARMACY | Facility: HOSPITAL | Age: 79
End: 2024-04-16
Payer: MEDICARE

## 2024-04-16 ENCOUNTER — APPOINTMENT (OUTPATIENT)
Dept: PHARMACY | Facility: HOSPITAL | Age: 79
End: 2024-04-16
Payer: MEDICARE

## 2024-04-16 DIAGNOSIS — R73.9 STEROID-INDUCED HYPERGLYCEMIA: ICD-10-CM

## 2024-04-16 DIAGNOSIS — R73.9 HYPERGLYCEMIA: ICD-10-CM

## 2024-04-16 DIAGNOSIS — T38.0X5A STEROID-INDUCED HYPERGLYCEMIA: ICD-10-CM

## 2024-04-16 RX ORDER — FUROSEMIDE 20 MG/1
20 TABLET ORAL DAILY
COMMUNITY
Start: 2024-03-29

## 2024-04-16 RX ORDER — SPIRONOLACTONE 25 MG/1
25 TABLET ORAL DAILY
COMMUNITY
Start: 2024-03-29

## 2024-04-16 RX ORDER — MYCOPHENOLATE MOFETIL 250 MG/1
250 CAPSULE ORAL 2 TIMES DAILY
COMMUNITY

## 2024-04-16 RX ORDER — AMOXICILLIN 500 MG/1
2000 TABLET, FILM COATED ORAL
COMMUNITY
Start: 2024-03-27

## 2024-04-16 NOTE — PROGRESS NOTES
Patient is sent at the request of Madi Sloan DO for my opinion regarding steroid-induced hyperglycemia.  My final recommendations will be communicated back to the requesting provider by way of shared medical record.    Subjective     Guero Cope is a 78 y.o. year old male patient with steroid induced hyperglycemia referred for hyperglycemia management. Today we are following up to discuss glycemic control since our previous encounter.       Allergies   Allergen Reactions    Sulfa (Sulfonamide Antibiotics) Rash     Current monitoring regimen:   Patient is using: glucometer / CGM - Freestyle Donta 3     Connected to office [facility] portal                         Objective     BP Readings from Last 4 Encounters:   24 142/80   24 124/78   24 125/87   23 122/78     BMI Readings from Last 4 Encounters:   24 25.11 kg/m²   24 25.11 kg/m²   24 25.54 kg/m²   23 25.11 kg/m²     Wt Readings from Last 4 Encounters:   24 79.4 kg (175 lb)   24 79.4 kg (175 lb)   24 80.7 kg (178 lb)   23 79.4 kg (175 lb)     MEDICATION RECONCILIATION  Added:   Spironolactone 25 mg daily  Furosemide 20 mg daily   Amoxicillin 500 m capsules prior to dental appointment   Patient no longer taking:   Pyridostigmine   Dapsone  Toujeo Max solostar  Vitamin C  Multivitamin    Diabetes Pharmacotherapy:   Toujeo Solostar 100 units/mL: Inject 14 units under the skin once daily in the morning (Not taking)  Metformin  mg: Take three tablets daily  Humalog Kwikpen 100 units/mL: Inject as 10-20-10 units TID plus sliding scale   <150 mg/dL - 0 units  151-200 mg/dL - 4 units  201-250 mg/dL - 6 units  251-300 mg/dL - 8 units  301-350 mg/dL - 10 units  351-400 mg/dL - 12 units  >400 mg/dL > 14 units, call MD    Steroid Therapy:   Prednisone 20 mg: Take 1 tablet by mouth once daily (Decreased from 2 tablets daily to 1 tablet daily on 3/18/2024)    Lab Review  Lab Results  "  Component Value Date    BILITOT 0.5 03/25/2024    CALCIUM 9.9 03/25/2024    CO2 22 03/25/2024     03/25/2024    CREATININE 1.48 (H) 03/25/2024    GLUCOSE 136 (H) 03/25/2024    ALKPHOS 37 03/25/2024    K 5.2 03/25/2024    PROT 6.3 (L) 03/25/2024     03/25/2024    AST 15 03/25/2024    ALT 14 03/25/2024    BUN 32 (H) 03/25/2024    ANIONGAP 18 03/25/2024    ALBUMIN 4.4 03/25/2024     No results found for: \"TRIG\", \"CHOL\", \"LDLCALC\", \"HDL\"    Lab Results   Component Value Date    HGBA1C 6.6 (H) 03/25/2024    HGBA1C 3.9 12/14/2023    HGBA1C 6.3 (H) 07/23/2023     The ASCVD Risk score (Raj WEINBERG, et al., 2019) failed to calculate for the following reasons:    The patient has a prior MI or stroke diagnosis    Drug Interactions:  No drug-drug interactions that require change in therapy    Assessment/Plan   Problem List Items Addressed This Visit       Hyperglycemia    Steroid-induced hyperglycemia     Pharmacotherapy: Decrease lunch dose from 20 units to 16 units   Continue:   Humalog Kwikpen 100 units/mL: Inject as 10-16-10 units TID plus sliding scale   <150 mg/dL - 0 units  151-200 mg/dL - 4 units  201-250 mg/dL - 6 units  251-300 mg/dL - 8 units  301-350 mg/dL - 10 units  351-400 mg/dL - 12 units  >400 mg/dL > 14 units, call MD  Metformin  mg: Take three tablets daily   Metformin properly dosed for current renal function  Donta 3 covered at no copay at [facility] pharmacy through Medicare Part B plan   Patient is on therapy with an intermediate acting steroid - Prednisone 20 mg TDD  Patient was not on baseline pharmacotherapy for diabetes mellitus or prediabetes. Patient does not note a history of anitdiabetic agents.  Discontinue:   Toujeo Solostar 100 units/mL: Inject 14 units under the skin once daily in the morning with breakfast   Compliance at present is estimated to be good.   Follow Up: 5/1/2024 at 9:30 am     Lior Bowser, PharmD    Continue all meds under the continuation of care with the " referring provider and clinical pharmacy team.

## 2024-04-18 ENCOUNTER — OFFICE VISIT (OUTPATIENT)
Dept: WOUND CARE | Facility: CLINIC | Age: 79
End: 2024-04-18
Payer: MEDICARE

## 2024-04-18 PROCEDURE — 11042 DBRDMT SUBQ TIS 1ST 20SQCM/<: CPT

## 2024-04-25 ENCOUNTER — OFFICE VISIT (OUTPATIENT)
Dept: PRIMARY CARE | Facility: CLINIC | Age: 79
End: 2024-04-25
Payer: MEDICARE

## 2024-04-25 ENCOUNTER — LAB (OUTPATIENT)
Dept: LAB | Facility: LAB | Age: 79
End: 2024-04-25
Payer: MEDICARE

## 2024-04-25 ENCOUNTER — OFFICE VISIT (OUTPATIENT)
Dept: WOUND CARE | Facility: CLINIC | Age: 79
End: 2024-04-25
Payer: MEDICARE

## 2024-04-25 ENCOUNTER — HOSPITAL ENCOUNTER (OUTPATIENT)
Dept: RADIOLOGY | Facility: CLINIC | Age: 79
Discharge: HOME | End: 2024-04-25
Payer: MEDICARE

## 2024-04-25 VITALS
BODY MASS INDEX: 25.05 KG/M2 | SYSTOLIC BLOOD PRESSURE: 116 MMHG | HEART RATE: 78 BPM | DIASTOLIC BLOOD PRESSURE: 62 MMHG | RESPIRATION RATE: 16 BRPM | WEIGHT: 175 LBS | OXYGEN SATURATION: 99 % | TEMPERATURE: 97.4 F | HEIGHT: 70 IN

## 2024-04-25 DIAGNOSIS — C61 PROSTATE CANCER (MULTI): ICD-10-CM

## 2024-04-25 DIAGNOSIS — E78.2 MIXED HYPERLIPIDEMIA: ICD-10-CM

## 2024-04-25 DIAGNOSIS — J06.9 ACUTE URI: ICD-10-CM

## 2024-04-25 DIAGNOSIS — G70.00 MYASTHENIA GRAVIS (MULTI): ICD-10-CM

## 2024-04-25 DIAGNOSIS — I10 PRIMARY HYPERTENSION: ICD-10-CM

## 2024-04-25 DIAGNOSIS — S61.209D OPEN WOUND OF FINGER, SUBSEQUENT ENCOUNTER: ICD-10-CM

## 2024-04-25 DIAGNOSIS — D64.9 ANEMIA, UNSPECIFIED TYPE: ICD-10-CM

## 2024-04-25 DIAGNOSIS — M43.25 FUSION OF SPINE OF THORACOLUMBAR REGION: ICD-10-CM

## 2024-04-25 DIAGNOSIS — I25.10 CORONARY ARTERY DISEASE INVOLVING NATIVE CORONARY ARTERY OF NATIVE HEART WITHOUT ANGINA PECTORIS: ICD-10-CM

## 2024-04-25 DIAGNOSIS — K44.9 HIATAL HERNIA: ICD-10-CM

## 2024-04-25 DIAGNOSIS — N28.1 BILATERAL RENAL CYSTS: ICD-10-CM

## 2024-04-25 DIAGNOSIS — R22.43 LOCALIZED SWELLING OF BOTH LOWER LEGS: ICD-10-CM

## 2024-04-25 DIAGNOSIS — R05.1 ACUTE COUGH: ICD-10-CM

## 2024-04-25 DIAGNOSIS — S81.811D NONINFECTED SKIN TEAR OF RIGHT LOWER EXTREMITY, SUBSEQUENT ENCOUNTER: ICD-10-CM

## 2024-04-25 DIAGNOSIS — K76.0 HEPATIC STEATOSIS: ICD-10-CM

## 2024-04-25 DIAGNOSIS — N20.0 KIDNEY STONES: ICD-10-CM

## 2024-04-25 DIAGNOSIS — R79.89 ELEVATED SERUM CREATININE: ICD-10-CM

## 2024-04-25 DIAGNOSIS — R73.9 HYPERGLYCEMIA: ICD-10-CM

## 2024-04-25 DIAGNOSIS — R79.89 ELEVATED SERUM CREATININE: Primary | ICD-10-CM

## 2024-04-25 LAB
ANION GAP SERPL CALC-SCNC: 16 MMOL/L (ref 10–20)
BUN SERPL-MCNC: 37 MG/DL (ref 6–23)
CALCIUM SERPL-MCNC: 9.9 MG/DL (ref 8.6–10.6)
CHLORIDE SERPL-SCNC: 106 MMOL/L (ref 98–107)
CO2 SERPL-SCNC: 22 MMOL/L (ref 21–32)
CREAT SERPL-MCNC: 1.61 MG/DL (ref 0.5–1.3)
EGFRCR SERPLBLD CKD-EPI 2021: 44 ML/MIN/1.73M*2
GLUCOSE SERPL-MCNC: 69 MG/DL (ref 74–99)
POTASSIUM SERPL-SCNC: 5.1 MMOL/L (ref 3.5–5.3)
SODIUM SERPL-SCNC: 139 MMOL/L (ref 136–145)

## 2024-04-25 PROCEDURE — 1159F MED LIST DOCD IN RCRD: CPT | Performed by: STUDENT IN AN ORGANIZED HEALTH CARE EDUCATION/TRAINING PROGRAM

## 2024-04-25 PROCEDURE — 71046 X-RAY EXAM CHEST 2 VIEWS: CPT

## 2024-04-25 PROCEDURE — 80048 BASIC METABOLIC PNL TOTAL CA: CPT

## 2024-04-25 PROCEDURE — 99214 OFFICE O/P EST MOD 30 MIN: CPT | Performed by: STUDENT IN AN ORGANIZED HEALTH CARE EDUCATION/TRAINING PROGRAM

## 2024-04-25 PROCEDURE — 3074F SYST BP LT 130 MM HG: CPT | Performed by: STUDENT IN AN ORGANIZED HEALTH CARE EDUCATION/TRAINING PROGRAM

## 2024-04-25 PROCEDURE — 3078F DIAST BP <80 MM HG: CPT | Performed by: STUDENT IN AN ORGANIZED HEALTH CARE EDUCATION/TRAINING PROGRAM

## 2024-04-25 PROCEDURE — 1160F RVW MEDS BY RX/DR IN RCRD: CPT | Performed by: STUDENT IN AN ORGANIZED HEALTH CARE EDUCATION/TRAINING PROGRAM

## 2024-04-25 PROCEDURE — 11042 DBRDMT SUBQ TIS 1ST 20SQCM/<: CPT

## 2024-04-25 PROCEDURE — 1036F TOBACCO NON-USER: CPT | Performed by: STUDENT IN AN ORGANIZED HEALTH CARE EDUCATION/TRAINING PROGRAM

## 2024-04-25 PROCEDURE — 71046 X-RAY EXAM CHEST 2 VIEWS: CPT | Performed by: RADIOLOGY

## 2024-04-25 PROCEDURE — 1157F ADVNC CARE PLAN IN RCRD: CPT | Performed by: STUDENT IN AN ORGANIZED HEALTH CARE EDUCATION/TRAINING PROGRAM

## 2024-04-25 PROCEDURE — G2211 COMPLEX E/M VISIT ADD ON: HCPCS | Performed by: STUDENT IN AN ORGANIZED HEALTH CARE EDUCATION/TRAINING PROGRAM

## 2024-04-25 PROCEDURE — 36415 COLL VENOUS BLD VENIPUNCTURE: CPT

## 2024-04-25 RX ORDER — AMOXICILLIN 875 MG/1
875 TABLET, FILM COATED ORAL 2 TIMES DAILY
Qty: 14 TABLET | Refills: 0 | Status: SHIPPED | OUTPATIENT
Start: 2024-04-25 | End: 2024-05-02

## 2024-04-25 ASSESSMENT — ENCOUNTER SYMPTOMS
SORE THROAT: 0
NAUSEA: 0
LIGHT-HEADEDNESS: 0
DIZZINESS: 0
DIAPHORESIS: 0
VOMITING: 0
CHILLS: 0
FEVER: 0
DIARRHEA: 0
SHORTNESS OF BREATH: 0
RHINORRHEA: 0

## 2024-04-25 NOTE — PROGRESS NOTES
"Subjective   Patient ID: Guero Cope \"Colby" is a 78 y.o. male who presents for Follow-up.  HPI    Since our last visit he saw Dr. Ashley who ordered stat head CT for contusion of right orbital tissues.     Has been having elevated creatinine and lasix is being decreased by cardiology and spironolactone increased. HE states he has stopped lasix completely. Last pill of lasix was 7 days ago.     Has had cough for last 10 days. Non productive cough. No fever, chills, sweats. Has fatigue. No rhinorrhea or PND. Tried mucinex DM for cough which helped some. Cough intermittent. Reports nasal congestion. Sometimes wakes him up at night. No symptoms after eating food. Was on lasix when cough started.     Face swelling has resolved after fall. Balance hasn't been great at times. Going around corners has been difficult.         Review of Systems   Constitutional:  Negative for chills, diaphoresis and fever.   HENT:  Negative for hearing loss, postnasal drip, rhinorrhea and sore throat.    Eyes:  Negative for visual disturbance.   Respiratory:  Negative for shortness of breath.    Cardiovascular:  Negative for chest pain.   Gastrointestinal:  Negative for diarrhea, nausea and vomiting.   Neurological:  Negative for dizziness and light-headedness.       /62   Pulse 78   Temp 36.3 °C (97.4 °F) (Temporal)   Resp 16   Ht 1.778 m (5' 10\")   Wt 79.4 kg (175 lb)   SpO2 99%   BMI 25.11 kg/m²     Objective   Physical Exam  Vitals reviewed.   Constitutional:       General: He is not in acute distress.     Appearance: Normal appearance.   HENT:      Head: Normocephalic.   Cardiovascular:      Rate and Rhythm: Normal rate and regular rhythm.   Pulmonary:      Effort: Pulmonary effort is normal. No respiratory distress.      Breath sounds: Normal breath sounds.   Abdominal:      General: There is no distension.   Musculoskeletal:         General: No deformity.   Skin:     Coloration: Skin is not jaundiced.   Neurological:      " Mental Status: He is alert.         Assessment/Plan   Problem List Items Addressed This Visit       Anemia    Kidney stones    Prostate cancer (Multi)    Mixed hyperlipidemia    Hyperglycemia    Elevated serum creatinine - Primary    Relevant Orders    Basic metabolic panel    Fusion of spine of thoracolumbar region    Bilateral renal cysts    Hepatic steatosis    Localized swelling of both lower legs    Noninfected skin tear of right leg    Open wnd of finger    Hiatal hernia    Primary hypertension    Coronary artery disease involving native coronary artery of native heart without angina pectoris    Myasthenia gravis (Multi)    Acute URI    Relevant Medications    amoxicillin (Amoxil) 875 mg tablet    Acute cough    Relevant Medications    amoxicillin (Amoxil) 875 mg tablet    Other Relevant Orders    XR chest 2 views       Acute cough  - Chest x-ray ordered today  - Amoxicillin for 7 days sent to pharmacy  - Try nasal saline rinses to help with nasal congestion which may help with symptoms.  - Follow-up in 4 to 6 weeks if still persistent-let us know if any worsening symptoms.    Elevated creatinine  - Repeat BMP today. If still high needs to see nephrology    RLE skin tear  Left index finger wound  -F/u and management per wound center     Myasthenia gravis  -Multiple admissions at [facility]  -Interval history per Dr. Caicedo with neurology: He was admitted at  from 8/27/23 to 9/9/23 for MG flare, presented with worsening of speech(losing his voice). He tried IVIG (2 doses) without benefit, had worsening of secretions and hypoxia needing intubation(x5 days). Had 5 cycles of PLEX (8/31 to 9/11/23) which helped, started on mycophenolate (500 mg bid).   -S/p removal of right sided TDC  -  Per neuro weaning prednisone on CellCept 1 g twice daily.  He is on vygart for 2 months while reducing prednisone.  -Continue f/u neurology     Lower extremity edema  -Connected with his cardiologist Dr. Roque  -Was diuresed and repeat  echo showed EF 55%, repeat in 1-2 years.  -Much improved.      Left sided hearing loss  -Referred to audiology and ENT. Resolved with 2 days of benadryl. Had outside hearing test done and told needs hearing aids.   -Resolved     Right buttock hematoma-resolved  -Reports this resolved.      Status post lumbar fusion  -Continue follow-up per Naz Murphy and Dr. Main, plan per them.    -Saw 1/22/24 and will see 5 months with xrays.   -No low back pain.     Primary hypertension  -Continue home losartan 100 mg a day, amlodipine 10 mg a day, follow-up with cardiology.  -Controlled today     Coronary artery disease  Hyperlipidemia  -Continue management per cardiology including rosuvastatin 20 mg a day, aspirin 81 mg a day.   -Management per Dr. Roque     Kidney stones  -On allopurinol  -Normal 3/24     Renal cyst  -Seen on imaging in hospitalization, advise f/u with urology previously     Prostate cancer  - Continue follow-up with Dr. Fay.  Flomax, Myrbetriq, and further management per urology.     Stage IIIa chronic kidney disease  - CKD noted in EMR, GFR is 37 as of most recent  -See above elevated creatinine    Hyperglycemia  -Continue follow-up with [facility] pharmacy staff.  -Seeing endocrinology today, Dr. Greene  - On insulin glargine 16 units a day, Humalog with sliding scale, metformin  -Recheck BMP today, may need to reduce metformin to 500mg BID while kidney function is impaired.      Insomnia  -Better, defers sleep med referral.      Cuadra's esophagus  Hiatal hernia  -on dexilant  -Hiatal hernia seen on CT chest 7/23  -Reflux controlled.      Normocytic anemia  -Stable since discharge around 9-9.5. On cellcept.  -Neuro monitoring with CBC and has been improving.      Mild hepatic steatosis seen on on U/s abdomen and spleen. Referred to hepatology previously.        Health Maintenance  -Prostate Cancer Screening: Via urology  -Vaccinations: Reports UTD pneumonia vaccine last winter, flu vaccine,  shingles, covid vaccination and booster, UTD TDAP 2026  -Lung Cancer Screening: Not indicated due to age  -AAA Screening: Not inducated due to age  -Colonoscopy: 2025     3 month f/u  Sooner PRN

## 2024-05-01 ENCOUNTER — TELEMEDICINE (OUTPATIENT)
Dept: PHARMACY | Facility: HOSPITAL | Age: 79
End: 2024-05-01
Payer: MEDICARE

## 2024-05-01 DIAGNOSIS — R73.9 HYPERGLYCEMIA: ICD-10-CM

## 2024-05-01 RX ORDER — INSULIN ASPART INJECTION 100 [IU]/ML
INJECTION, SOLUTION SUBCUTANEOUS
COMMUNITY
Start: 2024-04-27

## 2024-05-01 NOTE — PROGRESS NOTES
Patient is sent at the request of Madi Sloan DO for my opinion regarding steroid-induced hyperglycemia.  My final recommendations will be communicated back to the requesting provider by way of shared medical record.    Subjective     Guero Cope is a 78 y.o. year old male patient with steroid induced hyperglycemia referred for hyperglycemia management. Today we are following up to discuss glycemic control with current insulin therapy and steroid use.       Allergies   Allergen Reactions    Sulfa (Sulfonamide Antibiotics) Rash     Current monitoring regimen:   Patient is using: glucometer / CGM - Freestyle Donta 3     Connected to office [employer] portal                             Objective     BP Readings from Last 4 Encounters:   04/25/24 116/62   03/29/24 142/80   03/13/24 124/78   01/16/24 125/87     BMI Readings from Last 4 Encounters:   04/25/24 25.11 kg/m²   03/29/24 25.11 kg/m²   03/13/24 25.11 kg/m²   01/16/24 25.54 kg/m²     Wt Readings from Last 4 Encounters:   04/25/24 79.4 kg (175 lb)   03/29/24 79.4 kg (175 lb)   03/13/24 79.4 kg (175 lb)   01/16/24 80.7 kg (178 lb)     MEDICATION RECONCILIATION  No changes today    Diabetes Pharmacotherapy:   Fiasp Kwikpen 100 units/mL: Inject as 10-16-10 units TID plus sliding scale (2:50)  Metformin  mg: Take three tablets daily   Metformin properly dosed for current renal function    Steroid Therapy:   Prednisone 10 mg: Take 1 tablet by mouth once daily (Started on 4/28/2024 - Undetermined time regarding use of 10 mg dose)    Lab Review  Lab Results   Component Value Date    BILITOT 0.5 03/25/2024    CALCIUM 9.9 04/25/2024    CO2 22 04/25/2024     04/25/2024    CREATININE 1.61 (H) 04/25/2024    GLUCOSE 69 (L) 04/25/2024    ALKPHOS 37 03/25/2024    K 5.1 04/25/2024    PROT 6.3 (L) 03/25/2024     04/25/2024    AST 15 03/25/2024    ALT 14 03/25/2024    BUN 37 (H) 04/25/2024    ANIONGAP 16 04/25/2024    ALBUMIN 4.4 03/25/2024     No results  "found for: \"TRIG\", \"CHOL\", \"LDLCALC\", \"HDL\"    Lab Results   Component Value Date    HGBA1C 6.6 (H) 03/25/2024    HGBA1C 3.9 12/14/2023    HGBA1C 6.3 (H) 07/23/2023     The ASCVD Risk score (Raj WEINBERG, et al., 2019) failed to calculate for the following reasons:    The patient has a prior MI or stroke diagnosis    Drug Interactions:  No drug-drug interactions that require change in therapy    Assessment/Plan   Problem List Items Addressed This Visit       Hyperglycemia     Pharmacotherapy: Today we discussed continuation of therapy with mealtime insulin and metformin therapy. Prednisone has been again decreased from 20 mg/day to 10 mg/day. I have adjusted lunch time dosing to 14 units versus 16 units. We will follow up in 3 weeks and review alternative covered options.   Continue:   Fiasp Kwikpen 100 units/mL: Inject as 10-14-10 units TID plus sliding scale   Metformin  mg: Take three tablets daily   Metformin properly dosed for current renal function  Donta 3 covered at no copay at [facility] pharmacy through Medicare Part B plan   Patient is on therapy with an intermediate acting steroid - Prednisone 20 mg TDD  Patient was not on baseline pharmacotherapy for diabetes mellitus or prediabetes. Patient does not note a history of anitdiabetic agents.  Compliance at present is estimated to be good.   Follow Up: 05/22/2024 at 9:30 am     Lior Bowser, PharmD    Continue all meds under the continuation of care with the referring provider and clinical pharmacy team.  "

## 2024-05-02 ENCOUNTER — OFFICE VISIT (OUTPATIENT)
Dept: WOUND CARE | Facility: CLINIC | Age: 79
End: 2024-05-02
Payer: MEDICARE

## 2024-05-02 PROCEDURE — 11721 DEBRIDE NAIL 6 OR MORE: CPT

## 2024-05-08 ENCOUNTER — LAB (OUTPATIENT)
Dept: LAB | Facility: LAB | Age: 79
End: 2024-05-08
Payer: MEDICARE

## 2024-05-08 DIAGNOSIS — Z00.00 ROUTINE HEALTH MAINTENANCE: ICD-10-CM

## 2024-05-08 LAB
CHOLEST SERPL-MCNC: 200 MG/DL (ref 0–199)
CHOLESTEROL/HDL RATIO: 4.7
HDLC SERPL-MCNC: 42.4 MG/DL
LDLC SERPL CALC-MCNC: 110 MG/DL
NON HDL CHOLESTEROL: 158 MG/DL (ref 0–149)
TRIGL SERPL-MCNC: 236 MG/DL (ref 0–149)
VLDL: 47 MG/DL (ref 0–40)

## 2024-05-08 PROCEDURE — 36415 COLL VENOUS BLD VENIPUNCTURE: CPT

## 2024-05-08 PROCEDURE — 80061 LIPID PANEL: CPT

## 2024-05-23 ENCOUNTER — OFFICE VISIT (OUTPATIENT)
Dept: PRIMARY CARE | Facility: CLINIC | Age: 79
End: 2024-05-23
Payer: MEDICARE

## 2024-05-23 VITALS
OXYGEN SATURATION: 98 % | RESPIRATION RATE: 16 BRPM | TEMPERATURE: 96.9 F | HEIGHT: 70 IN | DIASTOLIC BLOOD PRESSURE: 68 MMHG | SYSTOLIC BLOOD PRESSURE: 126 MMHG | HEART RATE: 79 BPM | WEIGHT: 174 LBS | BODY MASS INDEX: 24.91 KG/M2

## 2024-05-23 DIAGNOSIS — G47.00 INSOMNIA, UNSPECIFIED TYPE: ICD-10-CM

## 2024-05-23 DIAGNOSIS — S81.812A NONINFECTED SKIN TEAR OF LEFT LOWER EXTREMITY, INITIAL ENCOUNTER: ICD-10-CM

## 2024-05-23 DIAGNOSIS — E11.9 DIABETES MELLITUS WITHOUT COMPLICATION (MULTI): ICD-10-CM

## 2024-05-23 DIAGNOSIS — K44.9 HIATAL HERNIA: ICD-10-CM

## 2024-05-23 DIAGNOSIS — R79.89 ELEVATED SERUM CREATININE: Primary | ICD-10-CM

## 2024-05-23 DIAGNOSIS — M10.9 GOUT, UNSPECIFIED CAUSE, UNSPECIFIED CHRONICITY, UNSPECIFIED SITE: ICD-10-CM

## 2024-05-23 DIAGNOSIS — I25.10 CORONARY ARTERY DISEASE INVOLVING NATIVE CORONARY ARTERY OF NATIVE HEART WITHOUT ANGINA PECTORIS: ICD-10-CM

## 2024-05-23 DIAGNOSIS — E78.2 MIXED HYPERLIPIDEMIA: ICD-10-CM

## 2024-05-23 DIAGNOSIS — N18.31 CKD STAGE 3A, GFR 45-59 ML/MIN (MULTI): ICD-10-CM

## 2024-05-23 DIAGNOSIS — K76.0 HEPATIC STEATOSIS: ICD-10-CM

## 2024-05-23 DIAGNOSIS — Z00.00 HEALTHCARE MAINTENANCE: ICD-10-CM

## 2024-05-23 DIAGNOSIS — G70.00 MYASTHENIA GRAVIS (MULTI): ICD-10-CM

## 2024-05-23 DIAGNOSIS — I10 PRIMARY HYPERTENSION: ICD-10-CM

## 2024-05-23 DIAGNOSIS — N28.1 BILATERAL RENAL CYSTS: ICD-10-CM

## 2024-05-23 PROCEDURE — 1170F FXNL STATUS ASSESSED: CPT | Performed by: STUDENT IN AN ORGANIZED HEALTH CARE EDUCATION/TRAINING PROGRAM

## 2024-05-23 PROCEDURE — 1160F RVW MEDS BY RX/DR IN RCRD: CPT | Performed by: STUDENT IN AN ORGANIZED HEALTH CARE EDUCATION/TRAINING PROGRAM

## 2024-05-23 PROCEDURE — G0439 PPPS, SUBSEQ VISIT: HCPCS | Performed by: STUDENT IN AN ORGANIZED HEALTH CARE EDUCATION/TRAINING PROGRAM

## 2024-05-23 PROCEDURE — 1036F TOBACCO NON-USER: CPT | Performed by: STUDENT IN AN ORGANIZED HEALTH CARE EDUCATION/TRAINING PROGRAM

## 2024-05-23 PROCEDURE — 99397 PER PM REEVAL EST PAT 65+ YR: CPT | Performed by: STUDENT IN AN ORGANIZED HEALTH CARE EDUCATION/TRAINING PROGRAM

## 2024-05-23 PROCEDURE — 3074F SYST BP LT 130 MM HG: CPT | Performed by: STUDENT IN AN ORGANIZED HEALTH CARE EDUCATION/TRAINING PROGRAM

## 2024-05-23 PROCEDURE — 1159F MED LIST DOCD IN RCRD: CPT | Performed by: STUDENT IN AN ORGANIZED HEALTH CARE EDUCATION/TRAINING PROGRAM

## 2024-05-23 PROCEDURE — 3078F DIAST BP <80 MM HG: CPT | Performed by: STUDENT IN AN ORGANIZED HEALTH CARE EDUCATION/TRAINING PROGRAM

## 2024-05-23 PROCEDURE — 99214 OFFICE O/P EST MOD 30 MIN: CPT | Performed by: STUDENT IN AN ORGANIZED HEALTH CARE EDUCATION/TRAINING PROGRAM

## 2024-05-23 PROCEDURE — 1157F ADVNC CARE PLAN IN RCRD: CPT | Performed by: STUDENT IN AN ORGANIZED HEALTH CARE EDUCATION/TRAINING PROGRAM

## 2024-05-23 ASSESSMENT — ENCOUNTER SYMPTOMS
CHILLS: 0
DYSURIA: 0
LIGHT-HEADEDNESS: 0
DEPRESSION: 0
NAUSEA: 0
SHORTNESS OF BREATH: 0
ROS SKIN COMMENTS: +BRUISING
DIAPHORESIS: 0
LOSS OF SENSATION IN FEET: 1
FEVER: 0
DIZZINESS: 0
DIARRHEA: 0
OCCASIONAL FEELINGS OF UNSTEADINESS: 0
VOMITING: 0

## 2024-05-23 ASSESSMENT — ACTIVITIES OF DAILY LIVING (ADL)
DRESSING: INDEPENDENT
DOING_HOUSEWORK: INDEPENDENT
GROCERY_SHOPPING: INDEPENDENT
MANAGING_FINANCES: INDEPENDENT
GROCERY_SHOPPING: INDEPENDENT
MANAGING_FINANCES: INDEPENDENT
TAKING_MEDICATION: INDEPENDENT
TAKING_MEDICATION: INDEPENDENT
DOING_HOUSEWORK: INDEPENDENT
BATHING: INDEPENDENT

## 2024-05-23 NOTE — PROGRESS NOTES
Subjective   Reason for Visit: Guero Cope is an 79 y.o. male here for a Medicare Wellness visit.     Past Medical, Surgical, and Family History reviewed and updated in chart.    Reviewed all medications by prescribing practitioner or clinical pharmacist (such as prescriptions, OTCs, herbal therapies and supplements) and documented in the medical record.    HPI    Since our last appointment he saw Dr. Bhatti and underwent left glenohumeral injection.  He also saw wound care and was started on topical vancomycin and at most recent visit left finger wound remained healed.  Right leg wound noted as healing.    He has been following with Dr. Roque regarding his lower extremity swelling.    His GFR improved with discontinuation of diuretics.    He saw Dr. Caicedo on May 21, 2024, was recommended to increase prednisone to 15 mg but remained on 10 mg a day and CellCept 1 g twice daily and will be getting Vygart while prednisone is reduced.    Had skin tear on LLE on car door, 1.5 weeks ago. Feels wound is healing appropriately.     Tripped over door sill when dog ran in front of him. This happened 3 months ago. Denies any residual pain or symptoms.     Blood sugars in 100s at home.  Now seeing endocrinology.      Patient Care Team:  Madi Sloan DO as PCP - General (Internal Medicine)  Madi Sloan DO as PCP - [employer]age PCP     Review of Systems   Constitutional:  Negative for chills, diaphoresis and fever.   HENT:  Negative for hearing loss.    Eyes:  Negative for visual disturbance.   Respiratory:  Negative for shortness of breath.    Cardiovascular:  Negative for chest pain.   Gastrointestinal:  Negative for diarrhea, nausea and vomiting.   Genitourinary:  Negative for dysuria, scrotal swelling and testicular pain.   Skin:  Negative for rash.        +bruising     Neurological:  Negative for dizziness and light-headedness.       Objective   Vitals:  /68   Pulse 79   Temp 36.1 °C (96.9 °F)  "(Temporal)   Resp 16   Ht 1.778 m (5' 10\")   Wt 78.9 kg (174 lb)   SpO2 98%   BMI 24.97 kg/m²       Physical Exam  Vitals reviewed.   Constitutional:       General: He is not in acute distress.     Appearance: Normal appearance.   HENT:      Head: Normocephalic.      Right Ear: Tympanic membrane, ear canal and external ear normal. There is no impacted cerumen.      Left Ear: Tympanic membrane, ear canal and external ear normal. There is no impacted cerumen.      Mouth/Throat:      Mouth: Mucous membranes are moist.      Pharynx: Oropharynx is clear. No oropharyngeal exudate or posterior oropharyngeal erythema.   Cardiovascular:      Rate and Rhythm: Normal rate and regular rhythm.   Pulmonary:      Effort: Pulmonary effort is normal. No respiratory distress.      Breath sounds: Normal breath sounds.   Abdominal:      General: Bowel sounds are normal. There is no distension.      Palpations: Abdomen is soft. There is no mass.      Tenderness: There is no abdominal tenderness. There is no guarding or rebound.   Musculoskeletal:         General: No deformity.      Cervical back: Neck supple. No tenderness.   Lymphadenopathy:      Cervical: No cervical adenopathy.   Skin:     Coloration: Skin is not jaundiced.      Comments: Approximate round 5x5cm skin tear to lateral distal left LE, erythematous base, no purulence, no significant warmth, no foul odor, no crepitus or fluctuance.    Neurological:      Mental Status: He is alert.         Assessment/Plan   Problem List Items Addressed This Visit       Mixed hyperlipidemia    Overview     Formatting of this note might be different from the original. Hyperlipidemia  Formatting of this note might be different from the original. Hyperlipidemia Last Assessment & Plan: Formatting of this note might be different from the original. - on rosuvastatin         Elevated serum creatinine - Primary    Overview     Last Assessment & Plan: Formatting of this note might be different " from the original. Assessment: Elevated serum Cr PLAN: -NaCl @ 50 mL/h x 10h -Careful with medication selection and dosing. -Monitor: signs, symptoms, disease progression, disease regression. -Evaluate: test results         Relevant Orders    US renal complete    Albumin, urine, random    Gout    Bilateral renal cysts    Hepatic steatosis    Hiatal hernia    Primary hypertension    Overview     Last Assessment & Plan: Formatting of this note might be different from the original. Assessment: HTN PLAN: -Resume losartan 100 mg PO daily on 9/23/2021 with holding parameters. -Monitor: signs, symptoms, disease progression, disease regression. -Evaluate: test results, medication effectiveness, response to treatment.  Last Assessment & Plan: Formatting of this note might be different from the original. - on amlodipine and losartan         Coronary artery disease involving native coronary artery of native heart without angina pectoris    Overview     Last Assessment & Plan: Formatting of this note might be different from the original. Assessment: CAD PLAN: -Continue home ASA 81 mg PO daily. -Continue home rosuvastatin 20 mg PO daily. -Monitor: signs, symptoms, disease progression, disease regression. -Evaluate: test results, medication effectiveness, response to treatment.  Last Assessment & Plan: Formatting of this note might be different from the original. - two stents in 2017 - on aspirin 81 mg daily         Myasthenia gravis (Multi)    Diabetes mellitus without complication (Multi)    Relevant Orders    Albumin, urine, random    CKD stage 3a, GFR 45-59 ml/min (Multi)    Insomnia    Noninfected skin tear of left leg    Relevant Orders    Referral to Wound Clinic    Healthcare maintenance     Left lower extremity skin tear  - Refer to wound care for evaluation, does not appear to be infected    Acute cough-resolved    Elevated creatinine  - Better with stopping diuretics but higher than 6-8 months ago  -U/s kidneys ordered  to better evaluate and advised should consider nephrology referral especially considering normal kidney function about 6 to 8 months ago, he defers for now.    RLE skin tear  Left index finger wound  -F/u and management per wound center  -Better     Myasthenia gravis  -Multiple admissions at [facility]  -Interval history per Dr. Caicedo with neurology: He was admitted at  from 8/27/23 to 9/9/23 for MG flare, presented with worsening of speech(losing his voice). He tried IVIG (2 doses) without benefit, had worsening of secretions and hypoxia needing intubation(x5 days). Had 5 cycles of PLEX (8/31 to 9/11/23) which helped, started on mycophenolate (500 mg bid).   -S/p removal of right sided TDC  -  Per neuro weaning prednisone on CellCept 1 g twice daily.  He is on vygart for 2 months while reducing prednisone.  -Continue f/u neurology     Lower extremity edema  -Connected with his cardiologist Dr. Roque  -Was diuresed and repeat echo showed EF 55%, repeat in 1-2 years.  -Much improved.      Left sided hearing loss  -Referred to audiology and ENT. Resolved with 2 days of benadryl. Had outside hearing test done and told needs hearing aids.   -Resolved     Right buttock hematoma-resolved  -Reports this resolved.      Status post lumbar fusion  -Continue follow-up per Naz Murphy and Dr. Main, plan per them.    -Saw 1/22/24 and will see 5 months with xrays.   -No low back pain.     Primary hypertension  -Continue home losartan 100 mg a day, amlodipine 10 mg a day, follow-up with cardiology.  -Controlled today     Coronary artery disease  Hyperlipidemia  -Continue management per cardiology including rosuvastatin 20 mg a day, aspirin 81 mg a day.   -Management per Dr. Roque     Kidney stones  -On allopurinol  -Normal uric acid 3/24  -F/u urology     Renal cyst  -Seen on imaging in hospitalization, advise f/u with urology again    Prostate cancer  - Continue follow-up with Dr. Fay.  Flomax, Myrbetriq, and further  management per urology.     Stage IIIa chronic kidney disease  - CKD noted in EMR, GFR is 46  -See above elevated creatinine  -U/s Levo League ordered     Hyperglycemia  -Continue follow-up with [facility] pharmacy staff.  -F/u endocrinology, Dr. Greene.  - On insulin glargine 10-14-10 units a day, Humalog with sliding scale, metformin  -BS well controlled.   -Urine albumin ordered  -Will need foot exam at next visit    Insomnia  -Sleep is good now     Cuadra's esophagus  Hiatal hernia  -on dexilant  -Hiatal hernia seen on CT chest 7/23  -Reflux controlled.      Normocytic anemia  -Stable since discharge around 9-9.5. On cellcept.  -Neuro monitoring with CBC and has been improving.      Mild hepatic steatosis seen on on U/s abdomen and spleen. Referred to hepatology previously.        Health Maintenance  -Prostate Cancer Screening: Per urology  -Vaccinations: Reports UTD pneumonia vaccine in last 2 years, flu vaccine, shingles, covid vaccination and booster, UTD TDAP, due 2026  -Lung Cancer Screening: Not indicated due to age  -AAA Screening: Not indicated due to age  -Colonoscopy: Due 2025    CPE completed.  Advised to keep a heart healthy, low fat  diet with fruits and veggies like Mediterranean diet.  Advised on the importance of exercise and maintaining 150 minutes of exercise per week (30 minutes per day 5 days a week).  Advised on regular eye and dental visits.  Discussed age appropriate cancer screening, immunizations and recommendations given.  Discussed avoiding illicit drugs and tobacco. Advised on appropriate use of alcohol.  Advised to wear seat belt.    Follow-up 3 months, sooner if needed

## 2024-05-28 ENCOUNTER — HOSPITAL ENCOUNTER (OUTPATIENT)
Dept: RADIOLOGY | Facility: HOSPITAL | Age: 79
Discharge: HOME | End: 2024-05-28
Payer: MEDICARE

## 2024-05-28 DIAGNOSIS — R79.89 ELEVATED SERUM CREATININE: ICD-10-CM

## 2024-05-28 PROCEDURE — 76770 US EXAM ABDO BACK WALL COMP: CPT

## 2024-05-28 PROCEDURE — 76770 US EXAM ABDO BACK WALL COMP: CPT | Performed by: RADIOLOGY

## 2024-05-30 ENCOUNTER — OFFICE VISIT (OUTPATIENT)
Dept: WOUND CARE | Facility: CLINIC | Age: 79
End: 2024-05-30
Payer: MEDICARE

## 2024-05-30 DIAGNOSIS — T38.0X5A STEROID-INDUCED HYPERGLYCEMIA: ICD-10-CM

## 2024-05-30 DIAGNOSIS — R73.9 STEROID-INDUCED HYPERGLYCEMIA: ICD-10-CM

## 2024-05-30 DIAGNOSIS — R73.9 HYPERGLYCEMIA: Primary | ICD-10-CM

## 2024-05-30 PROCEDURE — 11045 DBRDMT SUBQ TISS EACH ADDL: CPT

## 2024-05-30 PROCEDURE — 11042 DBRDMT SUBQ TIS 1ST 20SQCM/<: CPT

## 2024-06-06 ENCOUNTER — OFFICE VISIT (OUTPATIENT)
Dept: WOUND CARE | Facility: CLINIC | Age: 79
End: 2024-06-06
Payer: MEDICARE

## 2024-06-06 PROCEDURE — 11042 DBRDMT SUBQ TIS 1ST 20SQCM/<: CPT

## 2024-06-06 PROCEDURE — 11045 DBRDMT SUBQ TISS EACH ADDL: CPT

## 2024-06-12 ENCOUNTER — TELEPHONE (OUTPATIENT)
Dept: PHARMACY | Facility: HOSPITAL | Age: 79
End: 2024-06-12
Payer: MEDICARE

## 2024-06-12 NOTE — TELEPHONE ENCOUNTER
"Final Follow-Up Attempt   06/12/24    Referring Provider: Madi Sloan DO  Referral Reason: Diabetes   Outreach attempts made: 4    The Clinical Pharmacy Team was unable to reach Guero BRIGITTE Cope \"Fan\" for a follow-up assessment after multiple attempts were made. Please have patient contact Pharmacy Team for further assistance with diabetes management, if required.     Lior Bowser, PharmD   "

## 2024-06-13 ENCOUNTER — APPOINTMENT (OUTPATIENT)
Dept: RADIOLOGY | Facility: HOSPITAL | Age: 79
DRG: 603 | End: 2024-06-13
Payer: MEDICARE

## 2024-06-13 ENCOUNTER — OFFICE VISIT (OUTPATIENT)
Dept: WOUND CARE | Facility: CLINIC | Age: 79
End: 2024-06-13
Payer: MEDICARE

## 2024-06-13 ENCOUNTER — HOSPITAL ENCOUNTER (INPATIENT)
Facility: HOSPITAL | Age: 79
LOS: 1 days | Discharge: HOME HEALTH | End: 2024-06-14
Attending: EMERGENCY MEDICINE | Admitting: INTERNAL MEDICINE
Payer: MEDICARE

## 2024-06-13 DIAGNOSIS — L03.115 CELLULITIS OF RIGHT LEG: Primary | ICD-10-CM

## 2024-06-13 DIAGNOSIS — L03.115 CELLULITIS OF RIGHT LOWER EXTREMITY: ICD-10-CM

## 2024-06-13 PROBLEM — L03.90 CELLULITIS: Status: ACTIVE | Noted: 2024-06-13

## 2024-06-13 LAB
ALBUMIN SERPL BCP-MCNC: 3.7 G/DL (ref 3.4–5)
ALP SERPL-CCNC: 44 U/L (ref 33–136)
ALT SERPL W P-5'-P-CCNC: 7 U/L (ref 10–52)
ANION GAP SERPL CALC-SCNC: 16 MMOL/L (ref 10–20)
AST SERPL W P-5'-P-CCNC: 9 U/L (ref 9–39)
BASOPHILS # BLD AUTO: 0.01 X10*3/UL (ref 0–0.1)
BASOPHILS NFR BLD AUTO: 0.1 %
BILIRUB SERPL-MCNC: 0.7 MG/DL (ref 0–1.2)
BUN SERPL-MCNC: 32 MG/DL (ref 6–23)
CALCIUM SERPL-MCNC: 9 MG/DL (ref 8.6–10.3)
CHLORIDE SERPL-SCNC: 105 MMOL/L (ref 98–107)
CO2 SERPL-SCNC: 19 MMOL/L (ref 21–32)
CREAT SERPL-MCNC: 1.57 MG/DL (ref 0.5–1.3)
CRP SERPL-MCNC: 6.68 MG/DL
EGFRCR SERPLBLD CKD-EPI 2021: 45 ML/MIN/1.73M*2
EOSINOPHIL # BLD AUTO: 0 X10*3/UL (ref 0–0.4)
EOSINOPHIL NFR BLD AUTO: 0 %
ERYTHROCYTE [DISTWIDTH] IN BLOOD BY AUTOMATED COUNT: 15.7 % (ref 11.5–14.5)
ERYTHROCYTE [SEDIMENTATION RATE] IN BLOOD BY WESTERGREN METHOD: 15 MM/H (ref 0–20)
GLUCOSE BLD MANUAL STRIP-MCNC: 126 MG/DL (ref 74–99)
GLUCOSE BLD MANUAL STRIP-MCNC: 170 MG/DL (ref 74–99)
GLUCOSE SERPL-MCNC: 248 MG/DL (ref 74–99)
HCT VFR BLD AUTO: 29.5 % (ref 41–52)
HGB BLD-MCNC: 9.4 G/DL (ref 13.5–17.5)
IMM GRANULOCYTES # BLD AUTO: 0.17 X10*3/UL (ref 0–0.5)
IMM GRANULOCYTES NFR BLD AUTO: 1.5 % (ref 0–0.9)
LACTATE SERPL-SCNC: 2.7 MMOL/L (ref 0.4–2)
LACTATE SERPL-SCNC: 2.7 MMOL/L (ref 0.4–2)
LACTATE SERPL-SCNC: 2.9 MMOL/L (ref 0.4–2)
LACTATE SERPL-SCNC: 3.5 MMOL/L (ref 0.4–2)
LYMPHOCYTES # BLD AUTO: 0.29 X10*3/UL (ref 0.8–3)
LYMPHOCYTES NFR BLD AUTO: 2.6 %
MCH RBC QN AUTO: 28.5 PG (ref 26–34)
MCHC RBC AUTO-ENTMCNC: 31.9 G/DL (ref 32–36)
MCV RBC AUTO: 89 FL (ref 80–100)
MONOCYTES # BLD AUTO: 0.52 X10*3/UL (ref 0.05–0.8)
MONOCYTES NFR BLD AUTO: 4.6 %
NEUTROPHILS # BLD AUTO: 10.28 X10*3/UL (ref 1.6–5.5)
NEUTROPHILS NFR BLD AUTO: 91.2 %
NRBC BLD-RTO: 0 /100 WBCS (ref 0–0)
PLATELET # BLD AUTO: 154 X10*3/UL (ref 150–450)
POTASSIUM SERPL-SCNC: 4.9 MMOL/L (ref 3.5–5.3)
PROT SERPL-MCNC: 5.9 G/DL (ref 6.4–8.2)
RBC # BLD AUTO: 3.3 X10*6/UL (ref 4.5–5.9)
SODIUM SERPL-SCNC: 135 MMOL/L (ref 136–145)
WBC # BLD AUTO: 11.3 X10*3/UL (ref 4.4–11.3)

## 2024-06-13 PROCEDURE — 85652 RBC SED RATE AUTOMATED: CPT | Performed by: NURSE PRACTITIONER

## 2024-06-13 PROCEDURE — 2500000004 HC RX 250 GENERAL PHARMACY W/ HCPCS (ALT 636 FOR OP/ED): Performed by: NURSE PRACTITIONER

## 2024-06-13 PROCEDURE — 2500000004 HC RX 250 GENERAL PHARMACY W/ HCPCS (ALT 636 FOR OP/ED): Performed by: INTERNAL MEDICINE

## 2024-06-13 PROCEDURE — 96367 TX/PROPH/DG ADDL SEQ IV INF: CPT

## 2024-06-13 PROCEDURE — 36415 COLL VENOUS BLD VENIPUNCTURE: CPT | Performed by: NURSE PRACTITIONER

## 2024-06-13 PROCEDURE — 2500000002 HC RX 250 W HCPCS SELF ADMINISTERED DRUGS (ALT 637 FOR MEDICARE OP, ALT 636 FOR OP/ED): Performed by: INTERNAL MEDICINE

## 2024-06-13 PROCEDURE — G0378 HOSPITAL OBSERVATION PER HR: HCPCS

## 2024-06-13 PROCEDURE — 85025 COMPLETE CBC W/AUTO DIFF WBC: CPT | Performed by: NURSE PRACTITIONER

## 2024-06-13 PROCEDURE — 83605 ASSAY OF LACTIC ACID: CPT | Mod: 91 | Performed by: INTERNAL MEDICINE

## 2024-06-13 PROCEDURE — 73610 X-RAY EXAM OF ANKLE: CPT | Mod: RT

## 2024-06-13 PROCEDURE — 73610 X-RAY EXAM OF ANKLE: CPT | Mod: RIGHT SIDE | Performed by: RADIOLOGY

## 2024-06-13 PROCEDURE — 84075 ASSAY ALKALINE PHOSPHATASE: CPT | Performed by: NURSE PRACTITIONER

## 2024-06-13 PROCEDURE — 86140 C-REACTIVE PROTEIN: CPT | Performed by: NURSE PRACTITIONER

## 2024-06-13 PROCEDURE — 82947 ASSAY GLUCOSE BLOOD QUANT: CPT | Mod: 91

## 2024-06-13 PROCEDURE — 99212 OFFICE O/P EST SF 10 MIN: CPT

## 2024-06-13 PROCEDURE — 99285 EMERGENCY DEPT VISIT HI MDM: CPT | Mod: 25

## 2024-06-13 PROCEDURE — 99285 EMERGENCY DEPT VISIT HI MDM: CPT | Performed by: NURSE PRACTITIONER

## 2024-06-13 PROCEDURE — 87040 BLOOD CULTURE FOR BACTERIA: CPT | Mod: STJLAB | Performed by: NURSE PRACTITIONER

## 2024-06-13 PROCEDURE — 96361 HYDRATE IV INFUSION ADD-ON: CPT

## 2024-06-13 PROCEDURE — 83605 ASSAY OF LACTIC ACID: CPT | Performed by: NURSE PRACTITIONER

## 2024-06-13 PROCEDURE — 96365 THER/PROPH/DIAG IV INF INIT: CPT | Mod: 59

## 2024-06-13 PROCEDURE — 1100000001 HC PRIVATE ROOM DAILY

## 2024-06-13 PROCEDURE — 99222 1ST HOSP IP/OBS MODERATE 55: CPT | Performed by: INTERNAL MEDICINE

## 2024-06-13 RX ORDER — VANCOMYCIN HYDROCHLORIDE 1 G/200ML
1000 INJECTION, SOLUTION INTRAVENOUS ONCE
Status: COMPLETED | OUTPATIENT
Start: 2024-06-13 | End: 2024-06-13

## 2024-06-13 RX ORDER — TAMSULOSIN HYDROCHLORIDE 0.4 MG/1
0.8 CAPSULE ORAL DAILY
Status: DISCONTINUED | OUTPATIENT
Start: 2024-06-14 | End: 2024-06-14 | Stop reason: HOSPADM

## 2024-06-13 RX ORDER — FLUTICASONE PROPIONATE 50 MCG
1 SPRAY, SUSPENSION (ML) NASAL DAILY
Status: DISCONTINUED | OUTPATIENT
Start: 2024-06-14 | End: 2024-06-14 | Stop reason: HOSPADM

## 2024-06-13 RX ORDER — DEXTROSE 50 % IN WATER (D50W) INTRAVENOUS SYRINGE
12.5
Status: DISCONTINUED | OUTPATIENT
Start: 2024-06-13 | End: 2024-06-14 | Stop reason: HOSPADM

## 2024-06-13 RX ORDER — ASPIRIN 81 MG/1
81 TABLET ORAL DAILY
Status: DISCONTINUED | OUTPATIENT
Start: 2024-06-14 | End: 2024-06-14 | Stop reason: HOSPADM

## 2024-06-13 RX ORDER — ALLOPURINOL 300 MG/1
300 TABLET ORAL DAILY
Status: DISCONTINUED | OUTPATIENT
Start: 2024-06-14 | End: 2024-06-14 | Stop reason: HOSPADM

## 2024-06-13 RX ORDER — LOSARTAN POTASSIUM 100 MG/1
100 TABLET ORAL DAILY
Status: DISCONTINUED | OUTPATIENT
Start: 2024-06-14 | End: 2024-06-14 | Stop reason: HOSPADM

## 2024-06-13 RX ORDER — PREDNISONE 10 MG/1
10 TABLET ORAL DAILY
Status: DISCONTINUED | OUTPATIENT
Start: 2024-06-14 | End: 2024-06-14 | Stop reason: HOSPADM

## 2024-06-13 RX ORDER — CEFAZOLIN SODIUM 2 G/100ML
2 INJECTION, SOLUTION INTRAVENOUS EVERY 8 HOURS
Status: DISCONTINUED | OUTPATIENT
Start: 2024-06-13 | End: 2024-06-13

## 2024-06-13 RX ORDER — DEXTROSE 50 % IN WATER (D50W) INTRAVENOUS SYRINGE
25
Status: DISCONTINUED | OUTPATIENT
Start: 2024-06-13 | End: 2024-06-14 | Stop reason: HOSPADM

## 2024-06-13 RX ORDER — SPIRONOLACTONE 25 MG/1
25 TABLET ORAL DAILY
Status: DISCONTINUED | OUTPATIENT
Start: 2024-06-14 | End: 2024-06-14 | Stop reason: HOSPADM

## 2024-06-13 RX ORDER — ENOXAPARIN SODIUM 100 MG/ML
40 INJECTION SUBCUTANEOUS EVERY 24 HOURS
Status: DISCONTINUED | OUTPATIENT
Start: 2024-06-13 | End: 2024-06-14 | Stop reason: HOSPADM

## 2024-06-13 RX ORDER — AMLODIPINE BESYLATE 10 MG/1
10 TABLET ORAL DAILY
Status: DISCONTINUED | OUTPATIENT
Start: 2024-06-14 | End: 2024-06-14 | Stop reason: HOSPADM

## 2024-06-13 RX ORDER — INSULIN LISPRO 100 [IU]/ML
0-10 INJECTION, SOLUTION INTRAVENOUS; SUBCUTANEOUS
Status: DISCONTINUED | OUTPATIENT
Start: 2024-06-13 | End: 2024-06-14 | Stop reason: HOSPADM

## 2024-06-13 RX ORDER — INSULIN ASPART INJECTION 100 [IU]/ML
10-14 INJECTION, SOLUTION SUBCUTANEOUS
COMMUNITY

## 2024-06-13 RX ORDER — MYCOPHENOLATE MOFETIL 250 MG/1
1000 CAPSULE ORAL 2 TIMES DAILY
Status: DISCONTINUED | OUTPATIENT
Start: 2024-06-13 | End: 2024-06-14 | Stop reason: HOSPADM

## 2024-06-13 RX ORDER — FUROSEMIDE 20 MG/1
20 TABLET ORAL DAILY
Status: DISCONTINUED | OUTPATIENT
Start: 2024-06-14 | End: 2024-06-14 | Stop reason: HOSPADM

## 2024-06-13 SDOH — ECONOMIC STABILITY: TRANSPORTATION INSECURITY
IN THE PAST 12 MONTHS, HAS THE LACK OF TRANSPORTATION KEPT YOU FROM MEDICAL APPOINTMENTS OR FROM GETTING MEDICATIONS?: PATIENT DECLINED

## 2024-06-13 SDOH — ECONOMIC STABILITY: INCOME INSECURITY: HOW HARD IS IT FOR YOU TO PAY FOR THE VERY BASICS LIKE FOOD, HOUSING, MEDICAL CARE, AND HEATING?: PATIENT DECLINED

## 2024-06-13 SDOH — ECONOMIC STABILITY: HOUSING INSECURITY
IN THE LAST 12 MONTHS, WAS THERE A TIME WHEN YOU DID NOT HAVE A STEADY PLACE TO SLEEP OR SLEPT IN A SHELTER (INCLUDING NOW)?: PATIENT DECLINED

## 2024-06-13 SDOH — ECONOMIC STABILITY: HOUSING INSECURITY: IN THE LAST 12 MONTHS, HOW MANY PLACES HAVE YOU LIVED?: 1

## 2024-06-13 SDOH — SOCIAL STABILITY: SOCIAL INSECURITY: WERE YOU ABLE TO COMPLETE ALL THE BEHAVIORAL HEALTH SCREENINGS?: YES

## 2024-06-13 SDOH — ECONOMIC STABILITY: TRANSPORTATION INSECURITY
IN THE PAST 12 MONTHS, HAS LACK OF TRANSPORTATION KEPT YOU FROM MEETINGS, WORK, OR FROM GETTING THINGS NEEDED FOR DAILY LIVING?: PATIENT DECLINED

## 2024-06-13 SDOH — SOCIAL STABILITY: SOCIAL INSECURITY: HAVE YOU HAD THOUGHTS OF HARMING ANYONE ELSE?: NO

## 2024-06-13 SDOH — ECONOMIC STABILITY: INCOME INSECURITY: IN THE LAST 12 MONTHS, WAS THERE A TIME WHEN YOU WERE NOT ABLE TO PAY THE MORTGAGE OR RENT ON TIME?: PATIENT DECLINED

## 2024-06-13 ASSESSMENT — COGNITIVE AND FUNCTIONAL STATUS - GENERAL
MOBILITY SCORE: 18
TURNING FROM BACK TO SIDE WHILE IN FLAT BAD: A LITTLE
MOVING FROM LYING ON BACK TO SITTING ON SIDE OF FLAT BED WITH BEDRAILS: A LITTLE
MOVING TO AND FROM BED TO CHAIR: A LITTLE
CLIMB 3 TO 5 STEPS WITH RAILING: A LOT
DRESSING REGULAR LOWER BODY CLOTHING: A LITTLE
PATIENT BASELINE BEDBOUND: NO
DAILY ACTIVITIY SCORE: 23
WALKING IN HOSPITAL ROOM: A LITTLE

## 2024-06-13 ASSESSMENT — PATIENT HEALTH QUESTIONNAIRE - PHQ9
SUM OF ALL RESPONSES TO PHQ9 QUESTIONS 1 & 2: 0
1. LITTLE INTEREST OR PLEASURE IN DOING THINGS: NOT AT ALL
2. FEELING DOWN, DEPRESSED OR HOPELESS: NOT AT ALL

## 2024-06-13 ASSESSMENT — ACTIVITIES OF DAILY LIVING (ADL)
ASSISTIVE_DEVICE: WALKER;CANE
ADEQUATE_TO_COMPLETE_ADL: YES
GROOMING: INDEPENDENT
FEEDING YOURSELF: INDEPENDENT
JUDGMENT_ADEQUATE_SAFELY_COMPLETE_DAILY_ACTIVITIES: YES
PATIENT'S MEMORY ADEQUATE TO SAFELY COMPLETE DAILY ACTIVITIES?: YES
TOILETING: INDEPENDENT
HEARING - RIGHT EAR: FUNCTIONAL
DRESSING YOURSELF: INDEPENDENT
BATHING: INDEPENDENT
HEARING - LEFT EAR: FUNCTIONAL
WALKS IN HOME: INDEPENDENT

## 2024-06-13 ASSESSMENT — COLUMBIA-SUICIDE SEVERITY RATING SCALE - C-SSRS
2. HAVE YOU ACTUALLY HAD ANY THOUGHTS OF KILLING YOURSELF?: NO
1. IN THE PAST MONTH, HAVE YOU WISHED YOU WERE DEAD OR WISHED YOU COULD GO TO SLEEP AND NOT WAKE UP?: NO
6. HAVE YOU EVER DONE ANYTHING, STARTED TO DO ANYTHING, OR PREPARED TO DO ANYTHING TO END YOUR LIFE?: NO

## 2024-06-13 ASSESSMENT — LIFESTYLE VARIABLES
AUDIT-C TOTAL SCORE: 0
HOW MANY STANDARD DRINKS CONTAINING ALCOHOL DO YOU HAVE ON A TYPICAL DAY: PATIENT DOES NOT DRINK
SKIP TO QUESTIONS 9-10: 1
HAVE PEOPLE ANNOYED YOU BY CRITICIZING YOUR DRINKING: NO
EVER FELT BAD OR GUILTY ABOUT YOUR DRINKING: NO
HOW OFTEN DO YOU HAVE A DRINK CONTAINING ALCOHOL: NEVER
AUDIT-C TOTAL SCORE: 0
HOW OFTEN DO YOU HAVE 6 OR MORE DRINKS ON ONE OCCASION: NEVER
HAVE YOU EVER FELT YOU SHOULD CUT DOWN ON YOUR DRINKING: NO
EVER HAD A DRINK FIRST THING IN THE MORNING TO STEADY YOUR NERVES TO GET RID OF A HANGOVER: NO
TOTAL SCORE: 0

## 2024-06-13 ASSESSMENT — PAIN SCALES - GENERAL: PAINLEVEL_OUTOF10: 7

## 2024-06-13 ASSESSMENT — PAIN - FUNCTIONAL ASSESSMENT: PAIN_FUNCTIONAL_ASSESSMENT: 0-10

## 2024-06-13 ASSESSMENT — PAIN DESCRIPTION - PAIN TYPE: TYPE: CHRONIC PAIN

## 2024-06-13 NOTE — ED PROVIDER NOTES
Emergency Department Encounter  [facility] EMERGENCY MEDICINE    Patient: Guero Cope  MRN: [id number]  : 1945  Date of Evaluation: 2024  ED Provider: NESSA Aguilar    ED care was supervised by Dr. Briceno who independently examined and evaluated the patient. Please see their attestation note for further details.    Limitations to history: none  Independent Historian: self  Records reviewed: Care everywhere, paper chart, Inpatient and outpatient notes    Chief Complaint       Chief Complaint   Patient presents with    Leg Swelling     Bilateral leg wounds, just came from wound clinic, stating right leg is worse than left.     [place]    (Location/Symptom, Timing/Onset, Context/Setting, Quality, Duration, Modifying Factors, Severity) Note limiting factors.   Guero Cope is a 79 y.o. male who presents to the emergency department complaining of right lower extremity pain increased over the last few days, now with erythema, reports chills, was seen at wound care today, has venous stasis wounds bilateral lower extremities that he sees wound care for.  Right lower extremity with increased erythema and sent here for concern for early cellulitis, currently taking oral antibiotics, is on Cefzil since September.  Does have a history of myasthenia gravis, MI, hypertension, cancer.    ROS:     Review of Systems  14 systems reviewed and otherwise acutely negative except as in the [place].          Past History     Past Medical History:   Diagnosis Date    Allergic     Arthritis     Cancer (Multi)     GERD (gastroesophageal reflux disease)     Hypertension     Myocardial infarction (Multi)      Past Surgical History:   Procedure Laterality Date    BACK SURGERY      CORONARY STENT PLACEMENT      IR CVC PICC  2023    IR CVC PICC    JOINT REPLACEMENT      SPINE SURGERY       Social History     Socioeconomic History    Marital status:      Spouse name: None    Number of children:  None    Years of education: None    Highest education level: None   Occupational History    None   Tobacco Use    Smoking status: Never    Smokeless tobacco: Never   Vaping Use    Vaping status: Never Used   Substance and Sexual Activity    Alcohol use: Yes     Alcohol/week: 1.0 standard drink of alcohol     Types: 1 Glasses of wine per week     Comment: 1 drink/month    Drug use: Never    Sexual activity: Not Currently     Partners: Female   Other Topics Concern    None   Social History Narrative    None     Social Determinants of Health     Financial Resource Strain: Low Risk  (6/8/2023)    Received from [facility]    Overall Financial Resource Strain (CARDIA)     Difficulty of Paying Living Expenses: Not hard at all   Food Insecurity: No Food Insecurity (6/8/2023)    Received from [facility]    Hunger Vital Sign     Worried About Running Out of Food in the Last Year: Never true     Ran Out of Food in the Last Year: Never true   Transportation Needs: No Transportation Needs (6/8/2023)    Received from [facility]    PRAPARE - Transportation     Lack of Transportation (Medical): No     Lack of Transportation (Non-Medical): No   Physical Activity: Not on file   Stress: Not on file   Social Connections: Not on file   Intimate Partner Violence: Not on file   Housing Stability: Low Risk  (6/8/2023)    Received from [facility]    Housing Stability Vital Sign     Unable to Pay for Housing in the Last Year: No     Number of Places Lived in the Last Year: 1     Unstable Housing in the Last Year: No       Medications/Allergies     Previous Medications    ALLOPURINOL (ZYLOPRIM) 300 MG TABLET    Take 1 tablet (300 mg) by mouth once daily.    AMLODIPINE (NORVASC) 10 MG TABLET    Take 1 tablet (10 mg) by mouth once daily.    ASPIRIN 81 MG EC TABLET    Take 1 tablet (81 mg) by mouth once daily.    BLOOD-GLUCOSE SENSOR (FREESTYLE EMPERATRIZ 3 SENSOR) DEVICE    Apply 1 sensor to the back of the arm every 14 days.  "Change sensor every 2 weeks.    COENZYME Q-10 100 MG CAPSULE    Take 1 capsule (100 mg) by mouth once daily.    FLUTICASONE (FLONASE) 50 MCG/ACTUATION NASAL SPRAY    Administer 1 spray into each nostril once daily.    FUROSEMIDE (LASIX) 20 MG TABLET    Take 1 tablet (20 mg) by mouth once daily.    INSULIN ASPART, WITH NIACINAMIDE, (FIASP FLEXTOUCH U-100 INSULIN) 100 UNIT/ML (3 ML) INJECTION    Inject 10-14 Units under the skin 3 times a day before meals.    INSULIN LISPRO (HUMALOG KWIKPEN INSULIN) 100 UNIT/ML INJECTION    Inject as directed under the skin 14 units with breakfast, 22 units with lunch and 14 units with dinner plus sliding scale up to 86 units per day.    LOSARTAN (COZAAR) 100 MG TABLET    Take 1 tablet (100 mg) by mouth once daily.    METFORMIN XR (GLUCOPHAGE-XR) 500 MG 24 HR TABLET    Take 3 tablets (1,500 mg) by mouth once daily in the evening. Take with meals.    MYCOPHENOLATE (CELLCEPT) 500 MG TABLET    Take 2 tablets (1,000 mg) by mouth 2 times a day.    PEN NEEDLE, DIABETIC (BD ULTRA-FINE MICRO PEN NEEDLE) 32 GAUGE X 1/4\" NEEDLE    Use as directed four times daily with insulin.    PREDNISONE (DELTASONE) 20 MG TABLET    Take 0.5 tablets (10 mg) by mouth once daily.    SPIRONOLACTONE (ALDACTONE) 25 MG TABLET    Take 1 tablet (25 mg) by mouth once daily.    TAMSULOSIN (FLOMAX) 0.4 MG 24 HR CAPSULE    Take 2 capsules (0.8 mg) by mouth once daily.     Allergies   Allergen Reactions    Sulfa (Sulfonamide Antibiotics) Rash        Physical Exam       ED Triage Vitals [06/13/24 1002]   Temperature Heart Rate Respirations BP   36.2 °C (97.2 °F) 73 20 125/66      Pulse Ox Temp src Heart Rate Source Patient Position   99 % -- -- Sitting      BP Location FiO2 (%)     Right arm --         Physical Exam    GENERAL:  The patient appears nourished and normally developed. Vital signs as documented.     HEENT:  Head normocephalic, atraumatic, EOMs intact, PERRLA, Mucous membranes moist. Nares patent without " copious rhinorrhea.  No lymphadenopathy.    PULMONARY:  Lungs are clear to auscultation, without any respiratory distress. Able to speak full sentences, no accessory muscle use    CARDIAC:   Normal rate. No murmurs, rubs or gallops    ABDOMEN:  Soft, non-distended, non-tender, BS positive x 4 quadrants, No rebound or guarding, no peritoneal signs, no CVA tenderness, no masses or organomegaly    MUSCULOSKELETAL:   Able to ambulate, Non edematous, with no obvious deformities. Pulses intact distal    SKIN:   Good color, with no significant rashes.        NEURO:  No obvious neurological deficits, normal sensation and strength bilaterally.  Able to follow commands, NIH 0, CN 2-12 intact.        Diagnostics   Labs:  Results for orders placed or performed during the hospital encounter of 06/13/24   CBC and Auto Differential   Result Value Ref Range    WBC 11.3 4.4 - 11.3 x10*3/uL    nRBC 0.0 0.0 - 0.0 /100 WBCs    RBC 3.30 (L) 4.50 - 5.90 x10*6/uL    Hemoglobin 9.4 (L) 13.5 - 17.5 g/dL    Hematocrit 29.5 (L) 41.0 - 52.0 %    MCV 89 80 - 100 fL    MCH 28.5 26.0 - 34.0 pg    MCHC 31.9 (L) 32.0 - 36.0 g/dL    RDW 15.7 (H) 11.5 - 14.5 %    Platelets 154 150 - 450 x10*3/uL    Neutrophils % 91.2 40.0 - 80.0 %    Immature Granulocytes %, Automated 1.5 (H) 0.0 - 0.9 %    Lymphocytes % 2.6 13.0 - 44.0 %    Monocytes % 4.6 2.0 - 10.0 %    Eosinophils % 0.0 0.0 - 6.0 %    Basophils % 0.1 0.0 - 2.0 %    Neutrophils Absolute 10.28 (H) 1.60 - 5.50 x10*3/uL    Immature Granulocytes Absolute, Automated 0.17 0.00 - 0.50 x10*3/uL    Lymphocytes Absolute 0.29 (L) 0.80 - 3.00 x10*3/uL    Monocytes Absolute 0.52 0.05 - 0.80 x10*3/uL    Eosinophils Absolute 0.00 0.00 - 0.40 x10*3/uL    Basophils Absolute 0.01 0.00 - 0.10 x10*3/uL   Comprehensive metabolic panel   Result Value Ref Range    Glucose 248 (H) 74 - 99 mg/dL    Sodium 135 (L) 136 - 145 mmol/L    Potassium 4.9 3.5 - 5.3 mmol/L    Chloride 105 98 - 107 mmol/L    Bicarbonate 19 (L) 21 -  "32 mmol/L    Anion Gap 16 10 - 20 mmol/L    Urea Nitrogen 32 (H) 6 - 23 mg/dL    Creatinine 1.57 (H) 0.50 - 1.30 mg/dL    eGFR 45 (L) >60 mL/min/1.73m*2    Calcium 9.0 8.6 - 10.3 mg/dL    Albumin 3.7 3.4 - 5.0 g/dL    Alkaline Phosphatase 44 33 - 136 U/L    Total Protein 5.9 (L) 6.4 - 8.2 g/dL    AST 9 9 - 39 U/L    Bilirubin, Total 0.7 0.0 - 1.2 mg/dL    ALT 7 (L) 10 - 52 U/L   Lactate   Result Value Ref Range    Lactate 2.7 (H) 0.4 - 2.0 mmol/L   Sedimentation Rate   Result Value Ref Range    Sedimentation Rate 15 0 - 20 mm/h   C-Reactive Protein   Result Value Ref Range    C-Reactive Protein 6.68 (H) <1.00 mg/dL     Radiographs:  XR ankle right 3+ views   Final Result   Soft tissue swelling without acute osseous involvement             MACRO:   None        Signed by: Horacio Morales 6/13/2024 11:01 AM   Dictation workstation:   [id number]          Procedures:   Procedures             Assessment   In brief, Guero Cope is a 79 y.o. male who presented to the emergency department with right lower extremity erythema over the last 3 days with increased pain in the setting of history of diabetes, myasthenia gravis, chronic antibiotics and chronic venous stasis wounds bilateral lower extremities    Plan   IV, lab work, imaging, antibiotics    Differentials   Cellulitis  Wound infection   necrotizing process    ED Course     ED Course as of 06/14/24 1203   Thu Jun 13, 2024   1321 No leukocytosis but CRP elevated.  Hyperglycemic without an anion gap.  Antibiotics started.  Patient admitted. [DM]      ED Course User Index  [DM] Rosalio Briceno MD         Diagnoses as of 06/14/24 1203   Cellulitis of right leg       Visit Vitals  /57 (BP Location: Right arm, Patient Position: Lying)   Pulse 84   Temp 36.2 °C (97.2 °F)   Resp 18   Ht 1.778 m (5' 10\")   Wt 78 kg (171 lb 15.3 oz)   SpO2 99%   BMI 24.67 kg/m²   Smoking Status Never   BSA 1.96 m²       Medications   sodium chloride 0.9 % bolus 500 mL (500 mL " intravenous New Bag 6/13/24 1223)   piperacillin-tazobactam-dextrose (Zosyn) IV 4.5 g (0 g intravenous Stopped 6/13/24 1059)   vancomycin (Vancocin) 1,000 mg in dextrose 5% water 200 mL (0 mg intravenous Stopped 6/13/24 1222)       Plan of care discussed, patient is stable appearing, in no distress, was given vancomycin, Zosyn.  Has been on Cefzil since September, now with new cellulitis right lower extremity, sent for imaging with no air, does not appear to be gangrenous, no tracking.  No leukocytosis but does have an elevated CRP.  Patient will be treated and admitted to the hospital for failure of outpatient treatment      Final Impression      1. Cellulitis of right leg          DISPOSITION  Disposition: Admit to med/surg floor  Patient condition is stable    Comment: Please note this report has been produced using speech recognition software and may contain errors related to that system including errors in grammar, punctuation, and spelling, as well as words and phrases that may be inappropriate.  If there are any questions or concerns please feel free to contact the dictating provider for clarification.    Raciel Munroe, EARLENE-CNP     I saw and evaluated the patient. I personally obtained the key and critical portions of the history and physical exam or was physically present for key and critical portions performed by the resident/fellow/KEVIN. I reviewed the resident/fellow/KEVIN's documentation and discussed the patient with the resident/fellow/KEVIN. I agree with the resident/fellow/KEVIN's medical decision making as documented in the note.    ** Please excuse any errors in grammar or translation related to this dictation. Voice recognition software was utilized to prepare this document. **       Rosalio Briceno MD  [facility] Emergency Medicine          Rosalio Briceno MD  06/14/24 1232

## 2024-06-13 NOTE — H&P
"History Of Present Illness  Guero Cope \"Fan\" is a 79 y.o. male presenting with ongoing right lower extremity cellulitis.    Patient is an adequate historian, he is a retired psychiatrist.  Notes that he was at a wound care appointment earlier in the day, he was recommended to come to the hospital; he notes that he has not had any fevers or chills, he has a history of myasthenia gravis and notes that he did take his usual CellCept and steroid medications prior to coming to his wound care appointment.  He had been on outpatient antibiotics and had been compliant with the 5 doses so far, on his wound care appointment to follow-up, he was instructed to come to the hospital.  He otherwise denied having any vision change, headache, numbness weakness or tingling, denied having any chest pain, cough, sick contacts or recent travel, denied peripheral edema orthopnea.    On arrival to the emergency department he was afebrile, heart rate 73, respirations 20, /66, SpO2 99% on ambient air; his laboratory test did not show leukocytosis, there was a left shift however, he did have an elevated lactate that was still uptrending after mild fluid resuscitation using 500 cc normal saline, he was initially started on vancomycin and Zosyn, he was brought in for admission to the internal medicine service for further workup and treatment.    A 12 point ROS was elicited from patient and negative except as noted above in the HPI.     Past Medical History  Past Medical History:   Diagnosis Date    Allergic     Arthritis     Cancer (Multi)     GERD (gastroesophageal reflux disease)     Hypertension     Myocardial infarction (Multi)        Surgical History  Past Surgical History:   Procedure Laterality Date    BACK SURGERY      CORONARY STENT PLACEMENT      IR CVC PICC  12/8/2023    IR CVC PICC    JOINT REPLACEMENT      SPINE SURGERY          Social History  He reports that he has never smoked. He has never used smokeless tobacco. He " reports current alcohol use of about 1.0 standard drink of alcohol per week. He reports that he does not use drugs.    Family History  No family history on file.     Allergies  Sulfa (sulfonamide antibiotics)    Review of Systems   All other systems reviewed and are negative.       Physical Exam  Vitals reviewed.   Constitutional:       General: He is not in acute distress.     Appearance: Normal appearance. He is not ill-appearing.   HENT:      Head: Normocephalic and atraumatic.      Nose: Nose normal.      Mouth/Throat:      Mouth: Mucous membranes are moist.   Eyes:      General: No scleral icterus.  Cardiovascular:      Rate and Rhythm: Normal rate and regular rhythm.      Pulses: Normal pulses.      Heart sounds: Normal heart sounds. No murmur heard.     No friction rub. No gallop.   Pulmonary:      Effort: Pulmonary effort is normal. No respiratory distress.      Breath sounds: Normal breath sounds. No wheezing, rhonchi or rales.   Abdominal:      General: Abdomen is flat. Bowel sounds are normal. There is no distension.      Palpations: Abdomen is soft. There is no mass.      Tenderness: There is no abdominal tenderness. There is no guarding or rebound.   Musculoskeletal:         General: Swelling (mild swelling in the Right mid leg; gauze removed, two ~2x2cm anterior clean based wound with minor sloughing, granulations tissue forming, distal erythema and warmth, no prurlence) present. No tenderness or signs of injury.      Right lower leg: No edema.      Left lower leg: No edema.   Skin:     General: Skin is warm and dry.      Coloration: Skin is not jaundiced or pale.      Findings: No bruising, erythema, lesion or rash.   Neurological:      General: No focal deficit present.      Mental Status: He is alert and oriented to person, place, and time. Mental status is at baseline.      Cranial Nerves: No cranial nerve deficit.      Motor: No weakness.   Psychiatric:         Mood and Affect: Mood normal.         " Behavior: Behavior normal.         Thought Content: Thought content normal.         Judgment: Judgment normal.          Last Recorded Vitals  Blood pressure 111/57, pulse 84, temperature 36.2 °C (97.2 °F), resp. rate 18, height 1.778 m (5' 10\"), weight 78 kg (171 lb 15.3 oz), SpO2 99%.    Relevant Results  Scheduled medications  [START ON 6/14/2024] allopurinol, 300 mg, oral, Daily  [START ON 6/14/2024] amLODIPine, 10 mg, oral, Daily  [START ON 6/14/2024] aspirin, 81 mg, oral, Daily  enoxaparin, 40 mg, subcutaneous, q24h  [START ON 6/14/2024] fluticasone, 1 spray, Each Nostril, Daily  [START ON 6/14/2024] furosemide, 20 mg, oral, Daily  insulin lispro, 0-10 Units, subcutaneous, TID  [START ON 6/14/2024] losartan, 100 mg, oral, Daily  mycophenolate, 1,000 mg, oral, BID  piperacillin-tazobactam, 3.375 g, intravenous, q8h  [START ON 6/14/2024] predniSONE, 10 mg, oral, Daily  [START ON 6/14/2024] spironolactone, 25 mg, oral, Daily  [START ON 6/14/2024] tamsulosin, 0.8 mg, oral, Daily      Continuous medications     PRN medications  PRN medications: dextrose, dextrose, glucagon, glucagon    Results for orders placed or performed during the hospital encounter of 06/13/24 (from the past 24 hour(s))   CBC and Auto Differential   Result Value Ref Range    WBC 11.3 4.4 - 11.3 x10*3/uL    nRBC 0.0 0.0 - 0.0 /100 WBCs    RBC 3.30 (L) 4.50 - 5.90 x10*6/uL    Hemoglobin 9.4 (L) 13.5 - 17.5 g/dL    Hematocrit 29.5 (L) 41.0 - 52.0 %    MCV 89 80 - 100 fL    MCH 28.5 26.0 - 34.0 pg    MCHC 31.9 (L) 32.0 - 36.0 g/dL    RDW 15.7 (H) 11.5 - 14.5 %    Platelets 154 150 - 450 x10*3/uL    Neutrophils % 91.2 40.0 - 80.0 %    Immature Granulocytes %, Automated 1.5 (H) 0.0 - 0.9 %    Lymphocytes % 2.6 13.0 - 44.0 %    Monocytes % 4.6 2.0 - 10.0 %    Eosinophils % 0.0 0.0 - 6.0 %    Basophils % 0.1 0.0 - 2.0 %    Neutrophils Absolute 10.28 (H) 1.60 - 5.50 x10*3/uL    Immature Granulocytes Absolute, Automated 0.17 0.00 - 0.50 x10*3/uL    " Lymphocytes Absolute 0.29 (L) 0.80 - 3.00 x10*3/uL    Monocytes Absolute 0.52 0.05 - 0.80 x10*3/uL    Eosinophils Absolute 0.00 0.00 - 0.40 x10*3/uL    Basophils Absolute 0.01 0.00 - 0.10 x10*3/uL   Comprehensive metabolic panel   Result Value Ref Range    Glucose 248 (H) 74 - 99 mg/dL    Sodium 135 (L) 136 - 145 mmol/L    Potassium 4.9 3.5 - 5.3 mmol/L    Chloride 105 98 - 107 mmol/L    Bicarbonate 19 (L) 21 - 32 mmol/L    Anion Gap 16 10 - 20 mmol/L    Urea Nitrogen 32 (H) 6 - 23 mg/dL    Creatinine 1.57 (H) 0.50 - 1.30 mg/dL    eGFR 45 (L) >60 mL/min/1.73m*2    Calcium 9.0 8.6 - 10.3 mg/dL    Albumin 3.7 3.4 - 5.0 g/dL    Alkaline Phosphatase 44 33 - 136 U/L    Total Protein 5.9 (L) 6.4 - 8.2 g/dL    AST 9 9 - 39 U/L    Bilirubin, Total 0.7 0.0 - 1.2 mg/dL    ALT 7 (L) 10 - 52 U/L   Lactate   Result Value Ref Range    Lactate 2.7 (H) 0.4 - 2.0 mmol/L   Sedimentation Rate   Result Value Ref Range    Sedimentation Rate 15 0 - 20 mm/h   C-Reactive Protein   Result Value Ref Range    C-Reactive Protein 6.68 (H) <1.00 mg/dL   Lactate   Result Value Ref Range    Lactate 3.5 (H) 0.4 - 2.0 mmol/L     XR ankle right 3+ views    Result Date: 6/13/2024  Interpreted By:  Horacio Morales, STUDY: XR ANKLE RIGHT 3+ VIEWS; ;  6/13/2024 10:53 am   INDICATION: Signs/Symptoms:infection.   COMPARISON: None.   ACCESSION NUMBER(S): [id number]   ORDERING CLINICIAN: LIZ GILL   FINDINGS: Soft tissue swelling. Peripheral vascular disease. No acute fracture or dislocation. Lucency of the talar dome is likely chronic and may be a subchondral geodes. Although unlikely osteochondral lesion can not be excluded.   Enthesopathy of the plantar fascia insertion. No stigmata of osteomyelitis.       Soft tissue swelling without acute osseous involvement     MACRO: None   Signed by: Horacio Morales 6/13/2024 11:01 AM Dictation workstation:   [id number]    XR SCOLIOSIS PA STAND/LAT 2V    Result Date: 5/30/2024  * * *Final Report* * * DATE  OF EXAM: May 30 2024  1:09PM   X   [id number]  -  XR SCOLIOSIS 2V PA STAND/LAT   / ACCESSION #  [id number] PROCEDURE REASON: multiple diagnoses      * * * * Physician Interpretation * * * *  EXAMINATION:  XR SCOLIOSIS 2V PA STAND/LAT HISTORY:   LOW BACK PAIN, SCOLIOSIS CHECK  Fusion of spine of thoracolumbar region Spondylosis with myelopathy, thoracic region Myelopathy (HCC) . TECHNIQUE:  XR SCOLIOSIS 2V PA STAND/LAT    Laterality:  NOT APPLICABLE    Number of different views (projections): 2    M:  XB_1 COMPARISON:  01/03/2024 RESULT: Posterior fusion hardware extending from T10 through S1 appears intact and without loosening.  Thoracolumbar alignment appears unchanged.   Normal vertebral body heights.  Maintained sacroiliac joints and pubic symphysis.  Left hip arthroplasty in place.  Moderate right hip degenerative change. No acute fracture or dislocation. There are no bony erosions.    IMPRESSION: Intact postoperative changes. : DONITA   Transcribe Date/Time: May 30 2024  1:27P Dictated by : KIKI BARNETT MD This examination was interpreted and the report reviewed and electronically signed by: KIKI BARNETT MD on May 30 2024  1:28PM  EST    US renal complete    Result Date: 5/29/2024  Interpreted By:  Nancy Petersen, STUDY: US RENAL COMPLETE;  5/28/2024 11:38 am   INDICATION: Signs/Symptoms:elevated creatinine.   COMPARISON: 06/29/2016   ACCESSION NUMBER(S): [id number]   ORDERING CLINICIAN: CINDA SHERMAN   TECHNIQUE: Multiple images of the kidneys were obtained  .   FINDINGS: RIGHT KIDNEY: The right kidney measures 9.9 in length. The renal cortical echogenicity and thickness are within normal limits. No hydronephrosis is present; no evidence of nephrolithiasis. Right renal cysts with the larger measuring approximately 42 mm in greatest diameter.   LEFT KIDNEY: The left kidney measures 9.9 in length. The renal cortical echogenicity and thickness are within normal limits. No hydronephrosis is present;  no evidence of nephrolithiasis. A smaller simple subcentimeter left renal cysts.   BLADDER: Suboptimally distended and evaluated.       Simple and  minimally complex likely benign renal cysts bilaterally. No hydronephrosis.   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 cellulitis in the pretibial area on the right lower leg, no leukocytosis, although he had been on ?Ceftin for 5 doses (the wound care clinic is pending), he also has an immunocompromise status given history of myasthenia gravis for which he has been on steroids chronically; he is initially started on vancomycin and Zosyn in the emergency department, he will be admitted and continued on Zosyn, will follow his labs and clinical course, he is likely to do well with Augmentin on discharge, will hold off on further MRSA coverage unless there is purulence that develops or failure of the wound to improve, wound care consulted, no role for MRSA swab in lower extremity cellulitis, but monitor area for purulent drainage or failure of improvement on broad spectrum coverage.    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)    Plan:  -Patient stable for inpatient admission per   - Home medicine list has been reconciled including CellCept and chronic steroids, patient notes that he did take those this morning prior to coming to the hospital  - Continue on Zosyn as noted above, hold off on vancomycin given the appearance of the wounds, his history of CKD  - Would consider MRSA coverage if the wound fails  to heal or purulence develops  - Insulin sliding scale has been ordered, SSI with hypoglycemic precautions  - Diabetic diet  - Enoxaparin for VTE prophylaxis  - Patient states he would like to be DNR/DNI-CCA, although though he is all right with potentially elective intubation or ICU transfer depending on the circumstance    Plan of care was discussed in detail with the patient at the bedside in the emergency room at the time of admission    I spent 45 minutes in the professional and overall care of this patient.    Madi Lau MD

## 2024-06-14 VITALS
HEART RATE: 80 BPM | OXYGEN SATURATION: 96 % | WEIGHT: 163.58 LBS | HEIGHT: 70 IN | RESPIRATION RATE: 20 BRPM | DIASTOLIC BLOOD PRESSURE: 64 MMHG | SYSTOLIC BLOOD PRESSURE: 100 MMHG | TEMPERATURE: 97.2 F | BODY MASS INDEX: 23.42 KG/M2

## 2024-06-14 LAB
ANION GAP SERPL CALC-SCNC: 13 MMOL/L (ref 10–20)
BUN SERPL-MCNC: 26 MG/DL (ref 6–23)
CALCIUM SERPL-MCNC: 8 MG/DL (ref 8.6–10.3)
CHLORIDE SERPL-SCNC: 108 MMOL/L (ref 98–107)
CO2 SERPL-SCNC: 19 MMOL/L (ref 21–32)
CREAT SERPL-MCNC: 1.33 MG/DL (ref 0.5–1.3)
EGFRCR SERPLBLD CKD-EPI 2021: 54 ML/MIN/1.73M*2
ERYTHROCYTE [DISTWIDTH] IN BLOOD BY AUTOMATED COUNT: 15.9 % (ref 11.5–14.5)
GLUCOSE BLD MANUAL STRIP-MCNC: 114 MG/DL (ref 74–99)
GLUCOSE BLD MANUAL STRIP-MCNC: 125 MG/DL (ref 74–99)
GLUCOSE SERPL-MCNC: 102 MG/DL (ref 74–99)
HCT VFR BLD AUTO: 25.3 % (ref 41–52)
HGB BLD-MCNC: 8 G/DL (ref 13.5–17.5)
MAGNESIUM SERPL-MCNC: 1.4 MG/DL (ref 1.6–2.4)
MCH RBC QN AUTO: 29 PG (ref 26–34)
MCHC RBC AUTO-ENTMCNC: 31.6 G/DL (ref 32–36)
MCV RBC AUTO: 92 FL (ref 80–100)
NRBC BLD-RTO: 0 /100 WBCS (ref 0–0)
PLATELET # BLD AUTO: 135 X10*3/UL (ref 150–450)
POTASSIUM SERPL-SCNC: 4 MMOL/L (ref 3.5–5.3)
RBC # BLD AUTO: 2.76 X10*6/UL (ref 4.5–5.9)
SODIUM SERPL-SCNC: 136 MMOL/L (ref 136–145)
WBC # BLD AUTO: 6.6 X10*3/UL (ref 4.4–11.3)

## 2024-06-14 PROCEDURE — 2500000002 HC RX 250 W HCPCS SELF ADMINISTERED DRUGS (ALT 637 FOR MEDICARE OP, ALT 636 FOR OP/ED): Performed by: INTERNAL MEDICINE

## 2024-06-14 PROCEDURE — 2500000001 HC RX 250 WO HCPCS SELF ADMINISTERED DRUGS (ALT 637 FOR MEDICARE OP): Performed by: INTERNAL MEDICINE

## 2024-06-14 PROCEDURE — 82947 ASSAY GLUCOSE BLOOD QUANT: CPT

## 2024-06-14 PROCEDURE — 97165 OT EVAL LOW COMPLEX 30 MIN: CPT | Mod: GO

## 2024-06-14 PROCEDURE — 99239 HOSP IP/OBS DSCHRG MGMT >30: CPT | Performed by: STUDENT IN AN ORGANIZED HEALTH CARE EDUCATION/TRAINING PROGRAM

## 2024-06-14 PROCEDURE — 97161 PT EVAL LOW COMPLEX 20 MIN: CPT | Mod: GP

## 2024-06-14 PROCEDURE — 85027 COMPLETE CBC AUTOMATED: CPT | Performed by: INTERNAL MEDICINE

## 2024-06-14 PROCEDURE — 2500000004 HC RX 250 GENERAL PHARMACY W/ HCPCS (ALT 636 FOR OP/ED): Performed by: INTERNAL MEDICINE

## 2024-06-14 PROCEDURE — 36415 COLL VENOUS BLD VENIPUNCTURE: CPT | Performed by: INTERNAL MEDICINE

## 2024-06-14 PROCEDURE — 83735 ASSAY OF MAGNESIUM: CPT | Performed by: INTERNAL MEDICINE

## 2024-06-14 PROCEDURE — G0378 HOSPITAL OBSERVATION PER HR: HCPCS

## 2024-06-14 PROCEDURE — 80048 BASIC METABOLIC PNL TOTAL CA: CPT | Performed by: INTERNAL MEDICINE

## 2024-06-14 RX ORDER — AMOXICILLIN AND CLAVULANATE POTASSIUM 875; 125 MG/1; MG/1
1 TABLET, FILM COATED ORAL 2 TIMES DAILY
Qty: 10 TABLET | Refills: 0 | Status: SHIPPED | OUTPATIENT
Start: 2024-06-14 | End: 2024-06-19

## 2024-06-14 RX ORDER — ACETAMINOPHEN 500 MG
5 TABLET ORAL NIGHTLY PRN
Status: DISCONTINUED | OUTPATIENT
Start: 2024-06-14 | End: 2024-06-14 | Stop reason: HOSPADM

## 2024-06-14 ASSESSMENT — COGNITIVE AND FUNCTIONAL STATUS - GENERAL
CLIMB 3 TO 5 STEPS WITH RAILING: A LITTLE
DAILY ACTIVITIY SCORE: 22
DRESSING REGULAR UPPER BODY CLOTHING: A LITTLE
DRESSING REGULAR LOWER BODY CLOTHING: A LITTLE
MOVING FROM LYING ON BACK TO SITTING ON SIDE OF FLAT BED WITH BEDRAILS: A LITTLE
PERSONAL GROOMING: A LITTLE
MOVING TO AND FROM BED TO CHAIR: A LITTLE
MOBILITY SCORE: 18
WALKING IN HOSPITAL ROOM: A LITTLE
TOILETING: A LITTLE
DAILY ACTIVITIY SCORE: 19
TURNING FROM BACK TO SIDE WHILE IN FLAT BAD: A LITTLE
HELP NEEDED FOR BATHING: A LITTLE
MOBILITY SCORE: 24
DRESSING REGULAR LOWER BODY CLOTHING: A LITTLE
HELP NEEDED FOR BATHING: A LITTLE
STANDING UP FROM CHAIR USING ARMS: A LITTLE

## 2024-06-14 ASSESSMENT — ACTIVITIES OF DAILY LIVING (ADL): BATHING_ASSISTANCE: STAND BY

## 2024-06-14 ASSESSMENT — PAIN SCALES - GENERAL: PAINLEVEL_OUTOF10: 0 - NO PAIN

## 2024-06-14 ASSESSMENT — PAIN - FUNCTIONAL ASSESSMENT
PAIN_FUNCTIONAL_ASSESSMENT: 0-10
PAIN_FUNCTIONAL_ASSESSMENT: 0-10

## 2024-06-14 NOTE — CONSULTS
Wound Care Consult     Visit Date: 6/14/2024      Patient Name: Fan Cope         MRN: [id number]           YOB: 1945     Reason for Consult: Multiple skin tears all extremities.         Wound History: Present on admission     Pertinent Labs:   Albumin   Date Value Ref Range Status   06/13/2024 3.7 3.4 - 5.0 g/dL Final       Wound Assessment:  Wound 06/13/24 Pretibial Left (Active)       Wound 06/13/24 Pretibial Right (Active)       Wound 06/13/24 Arm Dorsal;Left;Lower (Active)       Wound Arm Lower;Right (Active)       Wound Team Summary Assessment: Pt was sent from [facility] yesterday for cellulitis. Patient had a small tear lower left arem and two lower right arm. Lateral aspect left leg there were 2 tears, each approx 3.5 x 2.0 x 0.1cm. Two skin tears right leg, just below knee and lateral.  These tears are each approx 2.0 x 1.0 x 0.1 cm.  Pt also noted to have multiple scabbed areas from tears. Pt goes to [facility] weekly and wife does dressing in between times.      Wound Team Plan: Wounds cleansed. NS moistened Diandra applied to wounds, covered with Mepilex. Instructed patients wife as I went along. Home going supplies provided.     Florencia Ngo RN  6/14/2024  3:11 PM

## 2024-06-14 NOTE — PROGRESS NOTES
06/14/24 1615   Discharge Planning   Living Arrangements Spouse/significant other   Support Systems Spouse/significant other   Assistance Needed uses a walker   Type of Residence Private residence     Met with patient. Identified self and role. Patient lives with his wife, is independent and uses a walker. PCP is Dr. Sloan, patient say 2 weeks ago. Verified address and insurance. Pharmacy is [facility] in [place]. Patient understands medicines and side effects.. Patient feels he manages his health. Plan to return home at discharge.

## 2024-06-14 NOTE — PROGRESS NOTES
"Physical Therapy    Physical Therapy Evaluation    Patient Name: Guero Cope \"Fan\"  MRN: [id number]  Today's Date: 6/14/2024   Time Calculation  Start Time: 1138  Stop Time: 1151  Time Calculation (min): 13 min  3115/3115-A    Assessment/Plan   PT Assessment: Pt appears at baseline level of function.  Pt was able to ambulate around room with SUP without any LOB.  Pt denies any concerns with returning home upon DC from the hospital.  Will DC PT orders at this time.    Evaluation/Treatment Tolerance: Patient tolerated treatment well  Medical Staff Made Aware: Yes  End of Session Communication: Bedside nurse  End of Session Patient Position: Up in chair, Alarm on  IP OR SWING BED PT PLAN  Inpatient or Swing Bed: Inpatient  PT Plan  PT Plan: PT Eval only  PT Eval Only Reason: Safe to return home  PT Frequency: PT eval only  PT Discharge Recommendations: No further acute PT, No PT needed after discharge  Equipment Recommended upon Discharge: Straight cane  PT Recommended Transfer Status: Independent  PT - OK to Discharge: Yes - To next level of care when cleared by medical team    Subjective     Current Problem:  1. 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    GERD (gastroesophageal reflux disease)    Hematuria    History of colon polyps    Left shoulder pain    Right shoulder pain    Kidney stones    Trochanteric bursitis of right hip    Spondylolisthesis at L5-S1 level    Spinal stenosis of lumbar region with radiculopathy    Sialosis    Sciatica    S/P lumbar spinal fusion    S/P laminectomy    Rotator cuff arthropathy, right    Right nephrolithiasis    Prostate cancer (Multi)    Primary osteoarthritis of left hip    Primary hypertension    Parotid tumor    Otitis externa    Myelopathy concurrent with and due to spinal stenosis of thoracic region (Multi)    Mixed hyperlipidemia    Hydronephrosis with renal and ureteral " calculous obstruction    Hydronephrosis of right kidney    Facial swelling    Difficult airway for intubation    Coronary artery disease involving native coronary artery of native heart without angina pectoris    Complex renal cyst    Anemia    Acute postoperative pain    Acute midline low back pain    Health care maintenance    Primary localized osteoarthrosis of shoulder region    Strain of rotator cuff capsule    Routine health maintenance    Stage 3a chronic kidney disease (Multi)    Hyperglycemia    BMI 28.0-28.9,adult    Acute postoperative anemia due to expected blood loss    CKD (chronic kidney disease) stage 2, GFR 60-89 ml/min    Cytomegalovirus (CMV) viremia (Multi)    Elevated serum creatinine    Fusion of spine of thoracolumbar region    Malnutrition of mild degree (Multi)    Oral phase dysphagia    Pressure injury of buttock, stage 1    Skin tear of lower leg without complication, left, sequela    Steroid-induced hyperglycemia    Thrombocytopenia (CMS-HCC)    Stage 2 chronic kidney disease    Gout    Bilateral lower extremity edema    Bilateral renal cysts    Hepatic steatosis    Degeneration of intervertebral disc of thoracic region with osteophyte    Dizziness    Fall    Fatigue    Hearing loss of left ear    Injury of buttock    Localized edema    Localized swelling of both lower legs    Localized swelling of right lower extremity    Pain in buttock    Noninfected skin tear of right leg    Open wnd of finger    Myasthenia gravis (Multi)    Hiatal hernia    Tachycardia    On mechanically assisted ventilation (Multi)    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     Patient is an adequate historian, he is a retired psychiatrist.  Notes that he was at a wound care appointment earlier in the day, he was recommended to come to the hospital; he notes that he has not had any fevers or chills, he has a history of myasthenia gravis and notes that he did take his usual CellCept and steroid medications prior to coming to his wound care appointment.  He had been on outpatient antibiotics and had been compliant with the 5 doses so far, on his wound care appointment to follow-up, he was instructed to come to the hospital.  He otherwise denied having any vision change, headache, numbness weakness or tingling, denied having any chest pain, cough, sick contacts or recent travel, denied peripheral edema orthopnea.     On arrival to the emergency department he was afebrile, heart rate 73, respirations 20, /66, SpO2 99% on ambient air; his laboratory test did not show leukocytosis, there was a left shift however, he did have an elevated lactate that was still uptrending after mild fluid resuscitation using 500 cc normal saline, he was initially started on vancomycin and Zosyn, he was brought in for admission to the internal medicine service for further workup and treatment.    On arrival, pt supine in bed.  Pt in no apparent distress and agreeable to therapy.    General  Reason for Referral: impaired mobility  Referred By: Jorge Luis Lopez  Co-Treatment: OT  Prior to Session Communication: Bedside nurse  Patient Position Received: Bed, 3 rail up, Alarm on    Home Living/PLOF:  Pt lives with wife in house with 1 TAMEKA. 1 floor set up.  Has WIS with chair and Gbs.  Indep with ADLs.  Mod I with mobility using SPC, owns FWW.  Pt drives and shops.  Denies any falls.    Precautions:  Precautions  Medical Precautions: Fall precautions     Objective     Pain:  Pain  Assessment  Pain Assessment: 0-10  Pain Score:  (pt initially denies any pain, but reports RLE pain to the touch)    Cognition:  Cognition  Overall Cognitive Status: Within Functional Limits  Orientation Level: Oriented X4    General Assessments:      Activity Tolerance  Endurance: Endurance does not limit participation in activity  Sensation  Sensation Comment: denies any numbness/tingling    Static Sitting Balance  Static Sitting-Comment/Number of Minutes: good  Dynamic Sitting Balance  Dynamic Sitting-Comments: good  Static Standing Balance  Static Standing-Comment/Number of Minutes: good  Dynamic Standing Balance  Dynamic Standing-Comments: good    Extremity/Trunk Assessments:  RLE Strength: appears WFL; not formally tested secondary to pain with touch  LLE Strength  L knee ext: 4+/5  L DF: 4+/5    Treatment:  Pt supine in bed on arrival.  Pt transferred supine to sit with Mod I.  Pt transferred sit to stand with SUP.  Pt ambulated 20 ft with SUP and pushing own IV pole, grossly steady without LOB.  Pt transferred stand to sit with SUP into bedside chair.  Pt was left in bedside chair with all needs in reach.    Outcome Measures:  [facility] Basic Mobility  Turning from your back to your side while in a flat bed without using bedrails: None  Moving from lying on your back to sitting on the side of a flat bed without using bedrails: None  Moving to and from bed to chair (including a wheelchair): None  Standing up from a chair using your arms (e.g. wheelchair or bedside chair): None  To walk in hospital room: None  Climbing 3-5 steps with railing: None  Basic Mobility - Total Score: 24    Education Documentation  Mobility Training, taught by Renee Zamudio PT at 6/14/2024  2:33 PM.  Learner: Patient  Readiness: Acceptance  Method: Explanation  Response: Verbalizes Understanding, Demonstrated Understanding    Education Comments  No comments found.

## 2024-06-14 NOTE — DISCHARGE SUMMARY
"Discharge Diagnosis  Cellulitis of right leg    Issues Requiring Follow-Up  Continue follow up with wound care clinic  Finish antibiotics with 5 more days of augmentin    Discharge Meds     Your medication list        START taking these medications        Instructions Last Dose Given Next Dose Due   amoxicillin-pot clavulanate 875-125 mg tablet  Commonly known as: Augmentin      Take 1 tablet by mouth 2 times a day for 5 days.              CONTINUE taking these medications        Instructions Last Dose Given Next Dose Due   allopurinol 300 mg tablet  Commonly known as: Zyloprim           amLODIPine 10 mg tablet  Commonly known as: Norvasc           aspirin 81 mg EC tablet           coenzyme Q-10 100 mg capsule           Fiasp FlexTouch U-100 Insulin 100 unit/mL (3 mL) injection  Generic drug: insulin aspart (with niacinamide)           fluticasone 50 mcg/actuation nasal spray  Commonly known as: Flonase           FreeStyle Donta 3 Sensor device  Generic drug: blood-glucose sensor      Apply 1 sensor to the back of the arm every 14 days. Change sensor every 2 weeks.       furosemide 20 mg tablet  Commonly known as: Lasix           losartan 100 mg tablet  Commonly known as: Cozaar           metFORMIN  mg 24 hr tablet  Commonly known as: Glucophage-XR      Take 3 tablets (1,500 mg) by mouth once daily in the evening. Take with meals.       mycophenolate 500 mg tablet  Commonly known as: Cellcept           pen needle, diabetic 32 gauge x 1/4\" needle  Commonly known as: BD Ultra-Fine Micro Pen Needle      Use as directed four times daily with insulin.       predniSONE 20 mg tablet  Commonly known as: Deltasone           spironolactone 25 mg tablet  Commonly known as: Aldactone           tamsulosin 0.4 mg 24 hr capsule  Commonly known as: Flomax                  ASK your doctor about these medications        Instructions Last Dose Given Next Dose Due   insulin lispro 100 unit/mL injection  Commonly known as: HumaLOG " "KwikPen Insulin      Inject as directed under the skin 14 units with breakfast, 22 units with lunch and 14 units with dinner plus sliding scale up to 86 units per day.                 Where to Get Your Medications        These medications were sent to [id number] - [place], [place] - [address] AT [address]  [address], [place] [address]      Phone: [phone]   amoxicillin-pot clavulanate 875-125 mg tablet         Test Results Pending At Discharge  Pending Labs       Order Current Status    Blood Culture Preliminary result    Blood Culture Preliminary result            Hospital Course  Guero Cope \"Fan\" is a 79 y.o. male presenting with ongoing right lower extremity cellulitis.     Patient is an adequate historian, he is a retired psychiatrist.  Notes that he was at a wound care appointment earlier in the day, he was recommended to come to the hospital; he notes that he has not had any fevers or chills, he has a history of myasthenia gravis and notes that he did take his usual CellCept and steroid medications prior to coming to his wound care appointment.  He had been on outpatient antibiotics and had been compliant with the 5 doses so far, on his wound care appointment to follow-up, he was instructed to come to the hospital.  He otherwise denied having any vision change, headache, numbness weakness or tingling, denied having any chest pain, cough, sick contacts or recent travel, denied peripheral edema orthopnea.     On arrival to the emergency department he was afebrile, heart rate 73, respirations 20, /66, SpO2 99% on ambient air; his laboratory test did not show leukocytosis, there was a left shift however, he did have an elevated lactate that was still uptrending after mild fluid resuscitation using 500 cc normal saline, he was initially started on vancomycin and Zosyn, he was brought in for admission to the internal medicine service for further workup and " treatment.    Hospital course: He was seen by wound care, who recommended NS moistened Diandra applied to wounds, covered with Mepilex; they provided direct instruction to pt's wife with each step and provided homegoing supplies. Evaluated by PT/OT who did not recommend any ongoing therapy. He was discharged home with oral augmentin that he was to take for an additional 5 days, comprising a total course of 6-7 days. He was eager to leave, and his wound had improved significantly over a short period of time. Questions were answered at bedside, and wound was seen by myself with wound care present.    Pertinent Physical Exam At Time of Discharge  Physical Exam  Vitals reviewed.   Constitutional:       General: He is awake. He is not in acute distress.     Appearance: Normal appearance. He is not ill-appearing or toxic-appearing.   HENT:      Head: Normocephalic and atraumatic.      Right Ear: External ear normal.      Left Ear: External ear normal.      Nose: Nose normal.      Mouth/Throat:      Mouth: Mucous membranes are moist.      Pharynx: Oropharynx is clear.   Eyes:      Extraocular Movements: Extraocular movements intact.   Cardiovascular:      Rate and Rhythm: Normal rate and regular rhythm.   Pulmonary:      Effort: Pulmonary effort is normal. No respiratory distress.      Breath sounds: Normal breath sounds.   Abdominal:      General: There is no distension.      Palpations: Abdomen is soft.      Tenderness: There is no abdominal tenderness.   Musculoskeletal:         General: Normal range of motion.      Cervical back: Normal range of motion.   Skin:     General: Skin is warm and dry.      Findings: Wound (right anterior wound, grossly C/D/I with granulation tissue.) present.   Neurological:      General: No focal deficit present.      Mental Status: He is alert and oriented to person, place, and time. Mental status is at baseline.   Psychiatric:         Mood and Affect: Mood normal.         Behavior:  Behavior normal. Behavior is cooperative.         Outpatient Follow-Up  Future Appointments   Date Time Provider Department Center   6/20/2024  9:30 AM Woundcare Provider [id number]   8/19/2024  9:30 AM DO [id number] Michael ARNOLD was the attending physician for this patient. Coordination of care and discharge process provided by me on day of discharge was > 30 minutes.    Jorge Luis Lopez DO  /[facility]

## 2024-06-14 NOTE — PROGRESS NOTES
"Occupational Therapy    Occupational Therapy    Evaluation    Patient Name: Guero Cope \"Fan\"  MRN: [id number]  Today's Date: 6/14/2024  Time Calculation  Start Time: 1137  Stop Time: 1152  Time Calculation (min): 15 min  3115/3115-A    Assessment  IP OT Assessment  OT Assessment: Pt pleasant and cooperative. Pt agrees he is close to baseline level of function, and does not have acute OT needs. Will discharge OT services  Prognosis: Good  End of Session Communication: Bedside nurse  End of Session Patient Position: Up in chair, Alarm on    Plan:  No Skilled OT: At baseline function  OT Frequency: OT eval only  OT Discharge Recommendations: No further acute OT  Equipment Recommended upon Discharge: Straight cane  OT Recommended Transfer Status: Stand by assist  OT - OK to Discharge: Yes (to next level of care)    Subjective     Current Problem:  1. Cellulitis of right leg            General:  General  Reason for Referral: ADL's  Referred By: Jorge Luis Lopez  Co-Treatment: PT  Prior to Session Communication: Bedside nurse  Patient Position Received: Bed, 3 rail up, Alarm on    General Visit Information:  Per EMR: pt presenting with ongoing right lower extremity cellulitis.     Patient is an adequate historian, he is a retired psychiatrist.  Notes that he was at a wound care appointment earlier in the day, he was recommended to come to the hospital; he notes that he has not had any fevers or chills, he has a history of myasthenia gravis and notes that he did take his usual CellCept and steroid medications prior to coming to his wound care appointment.  He had been on outpatient antibiotics and had been compliant with the 5 doses so far, on his wound care appointment to follow-up, he was instructed to come to the hospital.  He otherwise denied having any vision change, headache, numbness weakness or tingling, denied having any chest pain, cough, sick contacts or recent travel, denied peripheral edema orthopnea.     On " arrival to the emergency department he was afebrile, heart rate 73, respirations 20, /66, SpO2 99% on ambient air; his laboratory test did not show leukocytosis, there was a left shift however, he did have an elevated lactate that was still uptrending after mild fluid resuscitation using 500 cc normal saline, he was initially started on vancomycin and Zosyn, he was brought in for admission to the internal medicine service for further workup and treatment.     On arrival, pt supine in bed.  Pt in no apparent distress and agreeable to therapy.    Precautions:  Medical Precautions: Fall precautions  Precautions Comment: bed/chair alarm    Pain:  Pain Assessment  Pain Assessment: 0-10  Pain Score:  (pt initially denies any pain, but reports RLE pain to the touch)    Objective     Cognition:  Overall Cognitive Status: Within Functional Limits  Orientation Level: Oriented X4    Home Living/PLOF:  Pt lives with wife in house with 1 TAMEKA. 1 floor set up.  Has WIS with chair and Gbs.  Indep with ADLs.  Mod I with mobility using SPC, owns FWW.  Pt drives and shops.  Denies any falls.    ADL:  Eating Assistance: Independent  Grooming Assistance: Stand by  Bathing Assistance: Stand by  UE Dressing Assistance: Stand by  LE Dressing Assistance: Stand by  Toileting Assistance with Device: Stand by    Activity Tolerance:  Endurance: Endurance does not limit participation in activity    Bed Mobility/Transfers:   Supine-sit: supervision   Sit-stand: SBA  Stand-sit: SBA     Ambulation/Gait Training:  Pt ambulated without DME and SBA     Sitting Balance:  Static Sitting Balance  Static Sitting-Comment/Number of Minutes: good  Dynamic Sitting Balance  Dynamic Sitting-Comments: good    Standing Balance:  Static Standing Balance  Static Standing-Comment/Number of Minutes: good  Dynamic Standing Balance  Dynamic Standing-Comments: fair+    Sensation:  Sensation Comment: denies any numbness/tingling    Strength:  Strength Comments: B  UE's WFL    Extremities: RUE   RUE : Within Functional Limits and LUE   LUE: Within Functional Limits    Outcome Measures: [facility] Daily Activity  Putting on and taking off regular lower body clothing: A little  Bathing (including washing, rinsing, drying): A little  Putting on and taking off regular upper body clothing: None  Toileting, which includes using toilet, bedpan or urinal: None  Taking care of personal grooming such as brushing teeth: None  Eating Meals: None  Daily Activity - Total Score: 22    EDUCATION:  Education  Individual(s) Educated: Patient  Education Provided:  (safety)  Education Documentation  Body Mechanics, taught by Sulema Lockhart OT at 6/14/2024  3:27 PM.  Learner: Patient  Readiness: Acceptance  Method: Explanation  Response: Verbalizes Understanding    Precautions, taught by Sulema Lockhart OT at 6/14/2024  3:27 PM.  Learner: Patient  Readiness: Acceptance  Method: Explanation  Response: Verbalizes Understanding    Education Comments  No comments found.

## 2024-06-16 LAB
BACTERIA BLD CULT: NORMAL
BACTERIA BLD CULT: NORMAL

## 2024-06-16 NOTE — HOSPITAL COURSE
"Guero Cope \"Fan\" is a 79 y.o. male presenting with ongoing right lower extremity cellulitis.     Patient is an adequate historian, he is a retired psychiatrist.  Notes that he was at a wound care appointment earlier in the day, he was recommended to come to the hospital; he notes that he has not had any fevers or chills, he has a history of myasthenia gravis and notes that he did take his usual CellCept and steroid medications prior to coming to his wound care appointment.  He had been on outpatient antibiotics and had been compliant with the 5 doses so far, on his wound care appointment to follow-up, he was instructed to come to the hospital.  He otherwise denied having any vision change, headache, numbness weakness or tingling, denied having any chest pain, cough, sick contacts or recent travel, denied peripheral edema orthopnea.     On arrival to the emergency department he was afebrile, heart rate 73, respirations 20, /66, SpO2 99% on ambient air; his laboratory test did not show leukocytosis, there was a left shift however, he did have an elevated lactate that was still uptrending after mild fluid resuscitation using 500 cc normal saline, he was initially started on vancomycin and Zosyn, he was brought in for admission to the internal medicine service for further workup and treatment.    Hospital course: He was seen by wound care, who recommended NS moistened Diandra applied to wounds, covered with Mepilex; they provided direct instruction to pt's wife with each step and provided homegoing supplies. Evaluated by PT/OT who did not recommend any ongoing therapy. He was discharged home with oral augmentin that he was to take for an additional 5 days, comprising a total course of 6-7 days. He was eager to leave, and his wound had improved significantly over a short period of time. Questions were answered at bedside, and wound was seen by myself with wound care present.  "

## 2024-06-17 LAB
BACTERIA BLD CULT: NORMAL
BACTERIA BLD CULT: NORMAL

## 2024-06-18 ENCOUNTER — PATIENT OUTREACH (OUTPATIENT)
Dept: PRIMARY CARE | Facility: CLINIC | Age: 79
End: 2024-06-18
Payer: MEDICARE

## 2024-06-18 NOTE — DOCUMENTATION CLARIFICATION NOTE
"    PATIENT:               OTTO MORRISON  ACCT #:                  [id number]  MRN:                       [id number]  :                       1945  ADMIT DATE:       2024 9:55 AM  DISCH DATE:        2024 4:40 PM  RESPONDING PROVIDER #:        [id number]          PROVIDER RESPONSE TEXT:    Lactic acidosis is clinically significant and required treatment/monitoring    CDI QUERY TEXT:    Clarification        Instruction:    Based on your assessment of the patient and the clinical information, please provide the requested documentation by clicking on the appropriate radio button and enter any additional information if prompted.    Question: Is there a diagnosis indicative of the lab values    When answering this query, please exercise your independent professional judgment. The fact that a question is being asked, does not imply that any particular answer is desired or expected.    The patient's clinical indicators include:  Clinical Information: 79 yr old male admitted  with cellulitis RLE. Improved with IV ABX. Discharged .    Clinical Indicators: H/P  per Dr. Lau states: \"he did have an elevated lactate that was still uptrending after mild fluid resuscitation using 500 cc normal saline\"    Lactate levels on : 2.7<3.5>2.7<2.9.    Treatment: Trend serial lactate levels, IVF bolus of NS 500ml and NS 1L.    Risk Factors: Cellulitis RLE.  Options provided:  -- Lactic acidosis is clinically significant and required treatment/monitoring  -- Lactate levels 2.7/3.5/2.7/2.9 not requiring treatment or evaluation  -- Other - I will add my own diagnosis  -- Refer to Clinical Documentation Reviewer    Query created by: Risa Mercado on 2024 2:08 PM      Electronically signed by:  CHARLIE RODNEY DO 2024 9:26 PM          "

## 2024-06-19 NOTE — PROGRESS NOTES
Discharge Facility:[facility]  Discharge Diagnosis:Cellulitis of right leg   Admission Date:6/13/2024  Discharge Date: 6/14/2024    PCP Appointment Date:TBD   Specialist Appointment Date: 6/20/2024 Wound Care  6/24/2024 Neuro  7/15/2024 Neuro/Ui  Hospital Encounter and Summary: Linked    *Two attempts were made to reach patient within two business days after discharge. Voicemail left with contact information for patient to call back with any non-emergent questions or concerns.

## 2024-06-20 ENCOUNTER — OFFICE VISIT (OUTPATIENT)
Dept: WOUND CARE | Facility: CLINIC | Age: 79
End: 2024-06-20
Payer: MEDICARE

## 2024-06-20 DIAGNOSIS — T38.0X5A STEROID-INDUCED HYPERGLYCEMIA: ICD-10-CM

## 2024-06-20 DIAGNOSIS — R73.9 STEROID-INDUCED HYPERGLYCEMIA: ICD-10-CM

## 2024-06-20 PROCEDURE — 11045 DBRDMT SUBQ TISS EACH ADDL: CPT

## 2024-06-20 PROCEDURE — 11042 DBRDMT SUBQ TIS 1ST 20SQCM/<: CPT

## 2024-06-21 DIAGNOSIS — T38.0X5A STEROID-INDUCED HYPERGLYCEMIA: ICD-10-CM

## 2024-06-21 DIAGNOSIS — R73.9 STEROID-INDUCED HYPERGLYCEMIA: ICD-10-CM

## 2024-06-21 RX ORDER — METFORMIN HYDROCHLORIDE 500 MG/1
TABLET, EXTENDED RELEASE ORAL
Qty: 180 TABLET | Refills: 2 | Status: SHIPPED | OUTPATIENT
Start: 2024-06-21 | End: 2024-06-21 | Stop reason: SDUPTHER

## 2024-06-21 RX ORDER — METFORMIN HYDROCHLORIDE 500 MG/1
1500 TABLET, EXTENDED RELEASE ORAL
Qty: 270 TABLET | Refills: 2 | Status: SHIPPED | OUTPATIENT
Start: 2024-06-21 | End: 2025-03-18

## 2024-06-25 ENCOUNTER — OFFICE VISIT (OUTPATIENT)
Dept: PRIMARY CARE | Facility: CLINIC | Age: 79
End: 2024-06-25
Payer: MEDICARE

## 2024-06-25 VITALS
OXYGEN SATURATION: 93 % | WEIGHT: 172 LBS | BODY MASS INDEX: 24.62 KG/M2 | HEIGHT: 70 IN | HEART RATE: 64 BPM | RESPIRATION RATE: 16 BRPM | TEMPERATURE: 97.2 F | SYSTOLIC BLOOD PRESSURE: 116 MMHG | DIASTOLIC BLOOD PRESSURE: 72 MMHG

## 2024-06-25 DIAGNOSIS — Z00.00 ROUTINE HEALTH MAINTENANCE: Primary | ICD-10-CM

## 2024-06-25 DIAGNOSIS — L03.115 CELLULITIS OF RIGHT LOWER EXTREMITY: ICD-10-CM

## 2024-06-25 DIAGNOSIS — N28.1 KIDNEY CYSTS: ICD-10-CM

## 2024-06-25 DIAGNOSIS — R79.0 LOW MAGNESIUM LEVEL: ICD-10-CM

## 2024-06-25 DIAGNOSIS — R79.89 ELEVATED SERUM CREATININE: ICD-10-CM

## 2024-06-25 DIAGNOSIS — I49.9 IRREGULAR HEART RHYTHM: ICD-10-CM

## 2024-06-25 DIAGNOSIS — G70.00 MYASTHENIA GRAVIS (MULTI): ICD-10-CM

## 2024-06-25 DIAGNOSIS — R53.83 FATIGUE, UNSPECIFIED TYPE: ICD-10-CM

## 2024-06-25 PROCEDURE — 3074F SYST BP LT 130 MM HG: CPT | Performed by: STUDENT IN AN ORGANIZED HEALTH CARE EDUCATION/TRAINING PROGRAM

## 2024-06-25 PROCEDURE — 3078F DIAST BP <80 MM HG: CPT | Performed by: STUDENT IN AN ORGANIZED HEALTH CARE EDUCATION/TRAINING PROGRAM

## 2024-06-25 PROCEDURE — 1160F RVW MEDS BY RX/DR IN RCRD: CPT | Performed by: STUDENT IN AN ORGANIZED HEALTH CARE EDUCATION/TRAINING PROGRAM

## 2024-06-25 PROCEDURE — 1159F MED LIST DOCD IN RCRD: CPT | Performed by: STUDENT IN AN ORGANIZED HEALTH CARE EDUCATION/TRAINING PROGRAM

## 2024-06-25 PROCEDURE — 99495 TRANSJ CARE MGMT MOD F2F 14D: CPT | Performed by: STUDENT IN AN ORGANIZED HEALTH CARE EDUCATION/TRAINING PROGRAM

## 2024-06-25 PROCEDURE — 93000 ELECTROCARDIOGRAM COMPLETE: CPT | Performed by: STUDENT IN AN ORGANIZED HEALTH CARE EDUCATION/TRAINING PROGRAM

## 2024-06-25 PROCEDURE — 1157F ADVNC CARE PLAN IN RCRD: CPT | Performed by: STUDENT IN AN ORGANIZED HEALTH CARE EDUCATION/TRAINING PROGRAM

## 2024-06-25 PROCEDURE — 1036F TOBACCO NON-USER: CPT | Performed by: STUDENT IN AN ORGANIZED HEALTH CARE EDUCATION/TRAINING PROGRAM

## 2024-06-25 ASSESSMENT — ENCOUNTER SYMPTOMS
COLOR CHANGE: 0
DIZZINESS: 0
VOMITING: 0
DIAPHORESIS: 0
LIGHT-HEADEDNESS: 0
NAUSEA: 0
DIARRHEA: 0
SHORTNESS OF BREATH: 0
FEVER: 0
CHILLS: 0

## 2024-06-25 NOTE — PROGRESS NOTES
"Patient: Guero Cope \"Fan\"  : 1945  PCP: Madi Sloan DO  MRN: [id number]  Program: Transitional Care Management  Status: Enrolled  Effective Dates: 2024 - present  Responsible Staff: Genoveva Christopher MA  Social Determinants to be Addressed: Financial Resource Strain, Physical Activity, Social Connections, Stress, Transportation Needs         Guero Gruber" is a 79 y.o. male presenting today for follow-up after being discharged from the hospital 12 days ago. The main problem requiring admission was cellulitis. The discharge summary and/or Transitional Care Management documentation was reviewed. Medication reconciliation was performed as indicated via the \"Lee as Reviewed\" timestamp.     Guero Chacko\" was contacted by Transitional Care Management services two days after his discharge. This encounter and supporting documentation was reviewed.    He presented with cellulitis of the right leg at wound care and was transported to ER. His distal right extremity was erythematous, warm and tender to touch. He was admitted for cellulitis and on IV antibiotics. He was discharged on augmentin and completed this. He will see wound care on Thursday. Overall feels wounds better since hospitalization. He is now on 10mg of prednisone a day which is managed by neurology. Had another skin tear since discharge on left posterior shin. He scraped it on the side of a gonzáles frame.         Review of Systems   Constitutional:  Negative for chills (intermittent and unchanged for a few months), diaphoresis and fever.   Respiratory:  Negative for shortness of breath.    Cardiovascular:  Negative for chest pain and leg swelling.   Gastrointestinal:  Negative for diarrhea, nausea and vomiting.   Skin:  Negative for color change.   Neurological:  Negative for dizziness, syncope and light-headedness.       /72   Pulse 64   Temp 36.2 °C (97.2 °F) (Temporal)   Resp 16   Ht 1.778 m (5' 10\")   Wt 78 kg (172 lb)  "  SpO2 93%   BMI 24.68 kg/m²     Physical Exam  Vitals reviewed.   Constitutional:       General: He is not in acute distress.     Appearance: Normal appearance.   HENT:      Head: Normocephalic.   Cardiovascular:      Rate and Rhythm: Normal rate. Rhythm irregular.   Pulmonary:      Effort: Pulmonary effort is normal. No respiratory distress.      Breath sounds: Normal breath sounds.   Abdominal:      General: There is no distension.   Musculoskeletal:         General: No deformity.   Skin:     Coloration: Skin is not jaundiced.      Comments: Minimal erythema to right lower extremity, no significant warmth, tenderness, drainage or discharge.  Covered with dry gauze dressing and paper tape.    Skin tear approximately 4 cm long to the left posterior shin, erythematous base present.  Covered with dry gauze dressing and paper tape.   Neurological:      Mental Status: He is alert.         The complexity of medical decision making for this patient's transitional care is moderate.    Assessment/Plan   Problem List Items Addressed This Visit       Routine health maintenance - Primary    Elevated serum creatinine    Fatigue    Relevant Orders    CBC    Basic metabolic panel    Myasthenia gravis (Multi)    Cellulitis    Low magnesium level    Relevant Orders    Magnesium    Irregular heart rhythm    Relevant Orders    ECG 12 lead (Clinic Performed) (Completed)    Kidney cysts     Right lower extremity cellulitis  Skin tear left lower extremity  - Appears to be improving clinically-some nonpitting edema still present in the right lower extremity.  Recommended he elevate his leg during the day periodically to help with drainage.    -He will see wound care in 2 days.  -Skin tear does not appear to be infected.  He is covering this and will see wound care this week.  -Hospital course reviewed.    Myasthenia gravis  -Multiple admissions at [facility]  -Interval history per Dr. Caicedo with neurology: He was admitted at  from 8/27/23 to  9/9/23 for MG flare, presented with worsening of speech(losing his voice). He tried IVIG (2 doses) without benefit, had worsening of secretions and hypoxia needing intubation(x5 days). Had 5 cycles of PLEX (8/31 to 9/11/23) which helped, started on mycophenolate (500 mg bid).   -S/p removal of right sided TDC  -  Per neuro weaning prednisone on CellCept 1 g twice daily.  He is on vygart for 2 months while reducing prednisone.  -Continue f/u neurology  -Seeing neurology July 15th    Kidney cysts  Elevated creatinine  -Defers urology and nephrology f/u, reports urology looked at cysts in past.   -Can consider repeat u/s 1 year    Irregular heart rhythm  - Present on exam, EKG confirms sinus arrhythmia.    Fatigue  Low magnesium  - Will check CBC, CMP and magnesium

## 2024-06-27 ENCOUNTER — OFFICE VISIT (OUTPATIENT)
Dept: WOUND CARE | Facility: CLINIC | Age: 79
End: 2024-06-27
Payer: MEDICARE

## 2024-06-27 PROCEDURE — 11042 DBRDMT SUBQ TIS 1ST 20SQCM/<: CPT

## 2024-07-11 ENCOUNTER — OFFICE VISIT (OUTPATIENT)
Dept: WOUND CARE | Facility: CLINIC | Age: 79
End: 2024-07-11
Payer: MEDICARE

## 2024-07-11 PROCEDURE — 11045 DBRDMT SUBQ TISS EACH ADDL: CPT

## 2024-07-11 PROCEDURE — 11042 DBRDMT SUBQ TIS 1ST 20SQCM/<: CPT

## 2024-07-18 ENCOUNTER — PATIENT OUTREACH (OUTPATIENT)
Dept: PRIMARY CARE | Facility: CLINIC | Age: 79
End: 2024-07-18
Payer: MEDICARE

## 2024-07-18 ENCOUNTER — OFFICE VISIT (OUTPATIENT)
Dept: WOUND CARE | Facility: CLINIC | Age: 79
End: 2024-07-18
Payer: MEDICARE

## 2024-07-18 PROCEDURE — 11045 DBRDMT SUBQ TISS EACH ADDL: CPT

## 2024-07-18 PROCEDURE — 11042 DBRDMT SUBQ TIS 1ST 20SQCM/<: CPT

## 2024-07-18 NOTE — PROGRESS NOTES
Unable to reach patient for call back after patient's follow up appointment with PCP.   HUSAMM with call back number for patient to call if needed   If no voicemail available call attempts x 2 were made to contact the patient to assist with any questions or concerns patient may have.

## 2024-07-25 ENCOUNTER — OFFICE VISIT (OUTPATIENT)
Dept: WOUND CARE | Facility: CLINIC | Age: 79
End: 2024-07-25
Payer: MEDICARE

## 2024-07-25 PROCEDURE — 11042 DBRDMT SUBQ TIS 1ST 20SQCM/<: CPT

## 2024-08-01 ENCOUNTER — OFFICE VISIT (OUTPATIENT)
Dept: WOUND CARE | Facility: CLINIC | Age: 79
End: 2024-08-01
Payer: MEDICARE

## 2024-08-01 PROCEDURE — 11042 DBRDMT SUBQ TIS 1ST 20SQCM/<: CPT

## 2024-08-08 ENCOUNTER — APPOINTMENT (OUTPATIENT)
Dept: WOUND CARE | Facility: CLINIC | Age: 79
End: 2024-08-08
Payer: MEDICARE

## 2024-08-15 ENCOUNTER — OFFICE VISIT (OUTPATIENT)
Dept: WOUND CARE | Facility: CLINIC | Age: 79
End: 2024-08-15
Payer: MEDICARE

## 2024-08-15 PROCEDURE — 11042 DBRDMT SUBQ TIS 1ST 20SQCM/<: CPT

## 2024-08-19 ENCOUNTER — HOSPITAL ENCOUNTER (EMERGENCY)
Facility: HOSPITAL | Age: 79
Discharge: HOME | End: 2024-08-19
Payer: MEDICARE

## 2024-08-19 ENCOUNTER — LAB (OUTPATIENT)
Dept: LAB | Facility: LAB | Age: 79
End: 2024-08-19
Payer: MEDICARE

## 2024-08-19 ENCOUNTER — TELEPHONE (OUTPATIENT)
Dept: PRIMARY CARE | Facility: CLINIC | Age: 79
End: 2024-08-19

## 2024-08-19 ENCOUNTER — APPOINTMENT (OUTPATIENT)
Dept: PRIMARY CARE | Facility: CLINIC | Age: 79
End: 2024-08-19
Payer: MEDICARE

## 2024-08-19 VITALS
OXYGEN SATURATION: 97 % | HEART RATE: 117 BPM | DIASTOLIC BLOOD PRESSURE: 68 MMHG | WEIGHT: 161 LBS | BODY MASS INDEX: 23.05 KG/M2 | SYSTOLIC BLOOD PRESSURE: 108 MMHG | HEIGHT: 70 IN

## 2024-08-19 VITALS
HEIGHT: 70 IN | RESPIRATION RATE: 22 BRPM | WEIGHT: 161 LBS | HEART RATE: 123 BPM | TEMPERATURE: 97.7 F | OXYGEN SATURATION: 97 % | BODY MASS INDEX: 23.05 KG/M2 | DIASTOLIC BLOOD PRESSURE: 57 MMHG | SYSTOLIC BLOOD PRESSURE: 96 MMHG

## 2024-08-19 DIAGNOSIS — Z00.00 ROUTINE HEALTH MAINTENANCE: Primary | ICD-10-CM

## 2024-08-19 DIAGNOSIS — G70.00 MYASTHENIA GRAVIS (MULTI): ICD-10-CM

## 2024-08-19 DIAGNOSIS — E11.9 DIABETES MELLITUS WITHOUT COMPLICATION (MULTI): ICD-10-CM

## 2024-08-19 DIAGNOSIS — D69.6 THROMBOCYTOPENIA (CMS-HCC): ICD-10-CM

## 2024-08-19 DIAGNOSIS — I25.10 CORONARY ARTERY DISEASE INVOLVING NATIVE CORONARY ARTERY OF NATIVE HEART WITHOUT ANGINA PECTORIS: ICD-10-CM

## 2024-08-19 DIAGNOSIS — D64.9 ANEMIA, UNSPECIFIED TYPE: ICD-10-CM

## 2024-08-19 DIAGNOSIS — R00.0 TACHYCARDIA: ICD-10-CM

## 2024-08-19 DIAGNOSIS — S51.019D SKIN TEAR OF ELBOW WITHOUT COMPLICATION, UNSPECIFIED LATERALITY, SUBSEQUENT ENCOUNTER: ICD-10-CM

## 2024-08-19 DIAGNOSIS — R79.89 ELEVATED SERUM CREATININE: ICD-10-CM

## 2024-08-19 DIAGNOSIS — S81.812D NONINFECTED SKIN TEAR OF LEFT LOWER EXTREMITY, SUBSEQUENT ENCOUNTER: ICD-10-CM

## 2024-08-19 DIAGNOSIS — K22.70 BARRETT'S ESOPHAGUS WITHOUT DYSPLASIA: ICD-10-CM

## 2024-08-19 DIAGNOSIS — N18.31 CKD STAGE 3A, GFR 45-59 ML/MIN (MULTI): ICD-10-CM

## 2024-08-19 DIAGNOSIS — R79.0 LOW MAGNESIUM LEVEL: ICD-10-CM

## 2024-08-19 DIAGNOSIS — I73.9 PAD (PERIPHERAL ARTERY DISEASE) (CMS-HCC): ICD-10-CM

## 2024-08-19 DIAGNOSIS — R53.83 FATIGUE, UNSPECIFIED TYPE: ICD-10-CM

## 2024-08-19 DIAGNOSIS — I10 PRIMARY HYPERTENSION: ICD-10-CM

## 2024-08-19 DIAGNOSIS — E13.65 OTHER SPECIFIED DIABETES MELLITUS WITH HYPERGLYCEMIA, UNSPECIFIED WHETHER LONG TERM INSULIN USE (MULTI): ICD-10-CM

## 2024-08-19 DIAGNOSIS — E78.2 MIXED HYPERLIPIDEMIA: ICD-10-CM

## 2024-08-19 PROBLEM — Z99.11 ON MECHANICALLY ASSISTED VENTILATION (MULTI): Status: RESOLVED | Noted: 2024-03-29 | Resolved: 2024-08-19

## 2024-08-19 PROBLEM — S51.019A SKIN TEAR OF ELBOW WITHOUT COMPLICATION: Status: ACTIVE | Noted: 2024-08-19

## 2024-08-19 LAB
ALBUMIN SERPL BCP-MCNC: 4.2 G/DL (ref 3.4–5)
ALP SERPL-CCNC: 45 U/L (ref 33–136)
ALT SERPL W P-5'-P-CCNC: 9 U/L (ref 10–52)
ANION GAP SERPL CALC-SCNC: 19 MMOL/L (ref 10–20)
AST SERPL W P-5'-P-CCNC: 11 U/L (ref 9–39)
BILIRUB SERPL-MCNC: 0.4 MG/DL (ref 0–1.2)
BUN SERPL-MCNC: 95 MG/DL (ref 6–23)
CALCIUM SERPL-MCNC: 10.1 MG/DL (ref 8.6–10.6)
CHLORIDE SERPL-SCNC: 103 MMOL/L (ref 98–107)
CO2 SERPL-SCNC: 15 MMOL/L (ref 21–32)
CREAT SERPL-MCNC: 2.6 MG/DL (ref 0.5–1.3)
EGFRCR SERPLBLD CKD-EPI 2021: 24 ML/MIN/1.73M*2
ERYTHROCYTE [DISTWIDTH] IN BLOOD BY AUTOMATED COUNT: 14.7 % (ref 11.5–14.5)
GLUCOSE SERPL-MCNC: 194 MG/DL (ref 74–99)
HCT VFR BLD AUTO: 31.7 % (ref 41–52)
HGB BLD-MCNC: 10.3 G/DL (ref 13.5–17.5)
MAGNESIUM SERPL-MCNC: 1.75 MG/DL (ref 1.6–2.4)
MCH RBC QN AUTO: 27.3 PG (ref 26–34)
MCHC RBC AUTO-ENTMCNC: 32.5 G/DL (ref 32–36)
MCV RBC AUTO: 84 FL (ref 80–100)
NRBC BLD-RTO: 0 /100 WBCS (ref 0–0)
PLATELET # BLD AUTO: 187 X10*3/UL (ref 150–450)
POC HEMOGLOBIN A1C: 5.8 % (ref 4.2–6.5)
POTASSIUM SERPL-SCNC: 5.1 MMOL/L (ref 3.5–5.3)
PROT SERPL-MCNC: 6.2 G/DL (ref 6.4–8.2)
RBC # BLD AUTO: 3.77 X10*6/UL (ref 4.5–5.9)
SODIUM SERPL-SCNC: 132 MMOL/L (ref 136–145)
WBC # BLD AUTO: 6.9 X10*3/UL (ref 4.4–11.3)

## 2024-08-19 PROCEDURE — 82043 UR ALBUMIN QUANTITATIVE: CPT

## 2024-08-19 PROCEDURE — 4500999001 HC ED NO CHARGE

## 2024-08-19 PROCEDURE — 83735 ASSAY OF MAGNESIUM: CPT

## 2024-08-19 PROCEDURE — 36415 COLL VENOUS BLD VENIPUNCTURE: CPT

## 2024-08-19 PROCEDURE — 85027 COMPLETE CBC AUTOMATED: CPT

## 2024-08-19 PROCEDURE — 82570 ASSAY OF URINE CREATININE: CPT

## 2024-08-19 PROCEDURE — 80053 COMPREHEN METABOLIC PANEL: CPT

## 2024-08-19 RX ORDER — PREDNISONE 5 MG/1
15 TABLET ORAL DAILY
COMMUNITY

## 2024-08-19 RX ORDER — VIT C/E/ZN/COPPR/LUTEIN/ZEAXAN 250MG-90MG
25 CAPSULE ORAL DAILY
COMMUNITY

## 2024-08-19 RX ORDER — PREDNISONE 2.5 MG/1
2.5 TABLET ORAL DAILY
COMMUNITY

## 2024-08-19 ASSESSMENT — ENCOUNTER SYMPTOMS
DIZZINESS: 0
SHORTNESS OF BREATH: 0
CHILLS: 1
UNEXPECTED WEIGHT CHANGE: 1
PALPITATIONS: 0
LIGHT-HEADEDNESS: 0
DYSURIA: 0
DIARRHEA: 1
DIAPHORESIS: 0
NAUSEA: 0
FEVER: 0
VOMITING: 0
MYALGIAS: 0

## 2024-08-19 ASSESSMENT — PAIN SCALES - GENERAL: PAINLEVEL_OUTOF10: 0 - NO PAIN

## 2024-08-19 ASSESSMENT — LIFESTYLE VARIABLES
HAVE PEOPLE ANNOYED YOU BY CRITICIZING YOUR DRINKING: NO
HAVE YOU EVER FELT YOU SHOULD CUT DOWN ON YOUR DRINKING: NO
TOTAL SCORE: 0
EVER HAD A DRINK FIRST THING IN THE MORNING TO STEADY YOUR NERVES TO GET RID OF A HANGOVER: NO
EVER FELT BAD OR GUILTY ABOUT YOUR DRINKING: NO

## 2024-08-19 ASSESSMENT — PAIN - FUNCTIONAL ASSESSMENT: PAIN_FUNCTIONAL_ASSESSMENT: 0-10

## 2024-08-19 NOTE — PROGRESS NOTES
"Subjective   Patient ID: Guero Cope \"Colby" is a 79 y.o. male who presents for 3 month follow up (Patient c/o weakness believes it may be related to his insulin. //Patient states that he has been sleeping about 16 hours a day. ).  HPI    Since her last appointment he saw neurology and reduced prednisone, continued on CellCept and completed vyvgart 6/24/24.  He is also been following up with wound care.    He has had fatigue on exertion. Sleeping 16 hours a day. He felt he was going to pass out. Has decreased appetite. Food is not appealing to him. These symptoms have been present for the last 2 weeks and has no trigger he can think of. Also has chills. Drinks 6-7 bottles of water a day. He reports urine is clear. Wakes up 1-2 times at night to urinate.      No ptosis/double vision, intermittent speech difficulties-chronic, no chewing/swallowing issues, no breathing issues. No arm/leg weakness.       Review of Systems   Constitutional:  Positive for chills and unexpected weight change. Negative for diaphoresis and fever.   HENT:  Negative for ear discharge, ear pain and hearing loss.    Eyes:  Negative for visual disturbance.   Respiratory:  Negative for shortness of breath.    Cardiovascular:  Negative for chest pain, palpitations and leg swelling.   Gastrointestinal:  Positive for diarrhea (intermittent). Negative for nausea and vomiting.   Genitourinary:  Negative for dysuria and urgency.   Musculoskeletal:  Negative for myalgias.   Skin:  Negative for rash.   Neurological:  Negative for dizziness and light-headedness.        Feels disoriented on standing       /68 (BP Location: Right arm, Patient Position: Sitting, BP Cuff Size: Adult)   Pulse (!) 117   Ht 1.778 m (5' 10\")   Wt 73 kg (161 lb)   SpO2 97%   BMI 23.10 kg/m²     Objective   Physical Exam  Vitals reviewed.   Constitutional:       General: He is not in acute distress.     Appearance: Normal appearance.   HENT:      Head: Normocephalic. "   Cardiovascular:      Rate and Rhythm: Normal rate and regular rhythm.   Pulmonary:      Effort: Pulmonary effort is normal. No respiratory distress.      Breath sounds: Normal breath sounds.   Abdominal:      General: There is no distension.   Musculoskeletal:         General: No deformity.   Skin:     Coloration: Skin is not jaundiced.   Neurological:      Mental Status: He is alert.      Sensory: No sensory deficit.      Motor: No weakness.         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    Anemia    Mixed hyperlipidemia    Routine health maintenance - Primary    Thrombocytopenia (CMS-HCC)    Fatigue    Relevant Orders    CBC    Comprehensive Metabolic Panel (Completed)    Magnesium    Tachycardia    Relevant Orders    CBC    Comprehensive Metabolic Panel (Completed)    Magnesium    ECG 12 lead (Clinic Performed) (Completed)    PAD (peripheral artery disease) (CMS-[facility])    CKD stage 3a, GFR 45-59 ml/min (Multi)    Noninfected skin tear of left leg    Primary hypertension    Coronary artery disease involving native coronary artery of native heart without angina pectoris    Myasthenia gravis (Multi)    Skin tear of elbow without complication     Other Visit Diagnoses       Other specified diabetes mellitus with hyperglycemia, unspecified whether long term insulin use (Multi)        Relevant Orders    POCT glycosylated hemoglobin (Hb A1C) manually resulted (Completed)              Fatigue  Thrombocytopenia  Anemia  - EKG today unchanged  - Labs ordered  - Refuses ER evaluation at this time.  - Discussed if labs show any major abnormalities he needs ER evaluation    Left lower extremity skin tear  -F/u wound care     Acute cough-resolved     Elevated creatinine  - Better with stopping diuretics but higher than 6-8 months ago  -refused nephro referral previously  -Repeat labs     RLE skin tear  Left index finger wound  -F/u and management per wound  center  -Better     Myasthenia gravis  -Multiple admissions at [facility]  -Interval history per Dr. Caicedo with neurology: He was admitted at  from 8/27/23 to 9/9/23 for MG flare, presented with worsening of speech(losing his voice). He tried IVIG (2 doses) without benefit, had worsening of secretions and hypoxia needing intubation(x5 days). Had 5 cycles of PLEX (8/31 to 9/11/23) which helped, started on mycophenolate (500 mg bid).   -S/p removal of right sided TDC  -  Per neuro weaning prednisone on CellCept 1 g twice daily.  He is on vygart for 2 months while reducing prednisone.  -Continue f/u neurology    Primary hypertension  -Continue home losartan 100 mg a day, amlodipine 10 mg a day, follow-up with cardiology.  -Controlled today     Coronary artery disease  PAD  Hyperlipidemia  -Continue management per cardiology including rosuvastatin 20 mg a day, aspirin 81 mg a day.   -Management per Dr. Roque     Kidney stones  -On allopurinol  -Normal uric acid 3/24  -F/u urology     Renal cyst  -Seen on imaging in hospitalization, advise f/u with urology     Prostate cancer  - Continue follow-up with Dr. Fay.  Flomax, Myrbetriq, and further management per urology.     Stage IIIa chronic kidney disease  - CKD noted in EMR, GFR is 46  -See above elevated creatinine  -U/s kidenys reviewed     Hyperglycemia  -Continue follow-up with [facility] pharmacy staff.  -F/u endocrinology, Dr. Greene.  - On insulin glargine, Humalog with sliding scale, metformin  -A1c 5.8  -BS well controlled.   -Urine albumin ordered  -F/u and management per endocrinology     Insomnia  -Sleep is good now     Cuadra's esophagus  Hiatal hernia  -on dexilant  -Hiatal hernia seen on CT chest 7/23  -Reflux controlled.      Normocytic anemia  -Stable since discharge around 9-9.5. On cellcept.  -Neuro monitoring with CBC and has been improving.      Mild hepatic steatosis seen on on U/s abdomen and spleen. Referred to hepatology previously.       Lower extremity  edema-resolved  -Connected with his cardiologist Dr. Roque  -Was diuresed and repeat echo showed EF 55%, repeat in 1-2 years.     Left sided hearing loss  -Referred to audiology and ENT. Resolved with 2 days of benadryl. Had outside hearing test done and told needs hearing aids.   -Resolved     Right buttock hematoma-resolved  -Reports this resolved.      Status post lumbar fusion  -Continue follow-up per Naz Murphy and Dr. Main, plan per them.    -Saw 1/22/24 and will see 5 months with xrays.   -No low back pain.     Health Maintenance  -Prostate Cancer Screening: Per urology  -Vaccinations: Reports UTD pneumonia vaccine in last 2 years, flu vaccine, shingles, covid vaccination and booster, UTD TDAP, due 2026  -Lung Cancer Screening: Not indicated due to age  -AAA Screening: Not indicated due to age  -Colonoscopy: Due 2025    Follow-up 3 months, sooner depending on labs

## 2024-08-19 NOTE — ED NOTES
Pt LWBS from waiting area. Pt stated the wait was taking too long. Pt was advised to please return to ED if continuing to have symptoms. Provider updated.      Michelle Contreras RN  08/19/24 3058

## 2024-08-19 NOTE — TELEPHONE ENCOUNTER
----- Message from Madi Sloan sent at 8/19/2024  3:52 PM EDT -----  Please let him know his labs show that his kidney function is very worse and I recommend strongly that he call 911 for ER evaluation.

## 2024-08-19 NOTE — ED TRIAGE NOTES
Pt went to follow-up appointment today with blood work done, provider called and stated that patient need to come to ER related to kidney function test

## 2024-08-20 LAB
CREAT UR-MCNC: 49 MG/DL (ref 20–370)
MICROALBUMIN UR-MCNC: <7 MG/L
MICROALBUMIN/CREAT UR: NORMAL MG/G{CREAT}

## 2024-08-22 ENCOUNTER — PATIENT OUTREACH (OUTPATIENT)
Dept: PRIMARY CARE | Facility: CLINIC | Age: 79
End: 2024-08-22
Payer: MEDICARE

## 2024-08-22 ENCOUNTER — DOCUMENTATION (OUTPATIENT)
Dept: PRIMARY CARE | Facility: CLINIC | Age: 79
End: 2024-08-22
Payer: MEDICARE

## 2024-08-22 ENCOUNTER — OFFICE VISIT (OUTPATIENT)
Dept: WOUND CARE | Facility: CLINIC | Age: 79
End: 2024-08-22
Payer: MEDICARE

## 2024-08-22 ENCOUNTER — LAB (OUTPATIENT)
Dept: LAB | Facility: LAB | Age: 79
End: 2024-08-22
Payer: MEDICARE

## 2024-08-22 DIAGNOSIS — C61 MALIGNANT NEOPLASM OF PROSTATE (MULTI): Primary | ICD-10-CM

## 2024-08-22 PROCEDURE — 11042 DBRDMT SUBQ TIS 1ST 20SQCM/<: CPT

## 2024-08-22 PROCEDURE — 36415 COLL VENOUS BLD VENIPUNCTURE: CPT

## 2024-08-22 PROCEDURE — 84153 ASSAY OF PSA TOTAL: CPT

## 2024-08-22 NOTE — PROGRESS NOTES
Discharge Facility:[facility]   Discharge Diagnosis:RENE, Hyperkalemia   Admission Date:8/20/2024  Discharge Date: 8/21/2024    PCP Appointment Date:Declined  Specialist Appointment Date:   8/22/2024 Wound Care/S[facility]  8/28/2024 Neuro/Ui    Hospital Encounter and Summary Linked: Yes  See discharge assessment below for further details  Engagement  Call Start Time: 1252 (8/22/2024 12:56 PM)    Medications  Medications reviewed with patient/caregiver?: Yes (8/22/2024 12:56 PM)  Is the patient having any side effects they believe may be caused by any medication additions or changes?: No (8/22/2024 12:56 PM)  Does the patient have all medications ordered at discharge?: Yes (8/22/2024 12:56 PM)  Care Management Interventions: No intervention needed (8/22/2024 12:56 PM)  Prescription Comments: -- (Prednisone increased to 5 mg 3 qd, D/C Losartan, Spironlactone) (8/22/2024 12:56 PM)  Is the patient taking all medications as directed (includes completed medication regime)?: Yes (8/22/2024 12:56 PM)    Appointments  Does the patient have a primary care provider?: Yes (8/22/2024 12:56 PM)  Care Management Interventions: -- (Declined) (8/22/2024 12:56 PM)  Has the patient kept scheduled appointments due by today?: Yes (8/22/2024 12:56 PM)    Self Management  What is the home health agency?: -- (N/A) (8/22/2024 12:56 PM)  What Durable Medical Equipment (DME) was ordered?: -- (N/A) (8/22/2024 12:56 PM)    Wrap Up  Wrap Up Additional Comments: -- (Fan states he is doing well, went to wound care today. He will fu with Neurolgy, declined PCP fu.) (8/22/2024 12:56 PM)

## 2024-08-23 LAB — PSA SERPL-MCNC: 4.88 NG/ML

## 2024-08-24 PROBLEM — I48.91 ATRIAL FIBRILLATION WITH RVR (MULTI): Status: RESOLVED | Noted: 2024-03-29 | Resolved: 2024-08-24

## 2024-08-26 ENCOUNTER — APPOINTMENT (OUTPATIENT)
Dept: PRIMARY CARE | Facility: CLINIC | Age: 79
End: 2024-08-26
Payer: MEDICARE

## 2024-08-26 ENCOUNTER — LAB (OUTPATIENT)
Dept: LAB | Facility: LAB | Age: 79
End: 2024-08-26
Payer: MEDICARE

## 2024-08-26 VITALS
OXYGEN SATURATION: 100 % | WEIGHT: 161 LBS | HEIGHT: 70 IN | SYSTOLIC BLOOD PRESSURE: 120 MMHG | TEMPERATURE: 97.1 F | DIASTOLIC BLOOD PRESSURE: 60 MMHG | RESPIRATION RATE: 16 BRPM | BODY MASS INDEX: 23.05 KG/M2 | HEART RATE: 89 BPM

## 2024-08-26 DIAGNOSIS — E11.9 DIABETES MELLITUS WITHOUT COMPLICATION (MULTI): ICD-10-CM

## 2024-08-26 DIAGNOSIS — N17.9 AKI (ACUTE KIDNEY INJURY) (CMS-HCC): ICD-10-CM

## 2024-08-26 DIAGNOSIS — E87.5 HYPERKALEMIA: ICD-10-CM

## 2024-08-26 DIAGNOSIS — Z00.00 ROUTINE HEALTH MAINTENANCE: ICD-10-CM

## 2024-08-26 DIAGNOSIS — G70.00 MYASTHENIA GRAVIS (MULTI): ICD-10-CM

## 2024-08-26 DIAGNOSIS — E87.5 HYPERKALEMIA: Primary | ICD-10-CM

## 2024-08-26 PROCEDURE — 1160F RVW MEDS BY RX/DR IN RCRD: CPT | Performed by: STUDENT IN AN ORGANIZED HEALTH CARE EDUCATION/TRAINING PROGRAM

## 2024-08-26 PROCEDURE — 36415 COLL VENOUS BLD VENIPUNCTURE: CPT

## 2024-08-26 PROCEDURE — 85027 COMPLETE CBC AUTOMATED: CPT

## 2024-08-26 PROCEDURE — 1157F ADVNC CARE PLAN IN RCRD: CPT | Performed by: STUDENT IN AN ORGANIZED HEALTH CARE EDUCATION/TRAINING PROGRAM

## 2024-08-26 PROCEDURE — 80048 BASIC METABOLIC PNL TOTAL CA: CPT

## 2024-08-26 PROCEDURE — 1159F MED LIST DOCD IN RCRD: CPT | Performed by: STUDENT IN AN ORGANIZED HEALTH CARE EDUCATION/TRAINING PROGRAM

## 2024-08-26 PROCEDURE — 1036F TOBACCO NON-USER: CPT | Performed by: STUDENT IN AN ORGANIZED HEALTH CARE EDUCATION/TRAINING PROGRAM

## 2024-08-26 PROCEDURE — 99496 TRANSJ CARE MGMT HIGH F2F 7D: CPT | Performed by: STUDENT IN AN ORGANIZED HEALTH CARE EDUCATION/TRAINING PROGRAM

## 2024-08-26 PROCEDURE — 3078F DIAST BP <80 MM HG: CPT | Performed by: STUDENT IN AN ORGANIZED HEALTH CARE EDUCATION/TRAINING PROGRAM

## 2024-08-26 PROCEDURE — 83735 ASSAY OF MAGNESIUM: CPT

## 2024-08-26 PROCEDURE — 3074F SYST BP LT 130 MM HG: CPT | Performed by: STUDENT IN AN ORGANIZED HEALTH CARE EDUCATION/TRAINING PROGRAM

## 2024-08-26 ASSESSMENT — ENCOUNTER SYMPTOMS
CHILLS: 0
WEAKNESS: 0
DIARRHEA: 0
VOMITING: 0
SHORTNESS OF BREATH: 0
FEVER: 0
LIGHT-HEADEDNESS: 0
DYSURIA: 0
NAUSEA: 0
DIAPHORESIS: 0
DIZZINESS: 0

## 2024-08-26 NOTE — PROGRESS NOTES
"Patient: Guero Cope \"Fan\"  : 1945  PCP: Madi Slaon DO  MRN: [id number]  Program: Transitional Care Management  Status: Enrolled  Effective Dates: 2024 - present  Responsible Staff: Genoveva Christopher MA  Social Determinants to be Addressed: Financial Resource Strain, Physical Activity, Social Connections, Stress         Guero Samano"Fan\" is a 79 y.o. male presenting today for follow-up after being discharged from the hospital 5 days ago. The main problem requiring admission was acute kidney injury. The discharge summary and/or Transitional Care Management documentation was reviewed. Medication reconciliation was performed as indicated via the \"Lee as Reviewed\" timestamp.     Guero Samano"Fan\" was contacted by Transitional Care Management services two days after his discharge. This encounter and supporting documentation was reviewed.    He was referred to the ER due to RENE.  In the ER he was treated with Kayexalate with improvement of potassium which was found to be 6.0.  He was given IV fluids for RENE.  His neurologist was consulted and was recommended to increase prednisone to 15 mg a day and endocrinology consult was placed.  Creatinine improved after IV hydration.  On discharge his spironolactone was held and his ARB was held.    He reports he is in contact with Dr. Greene regarding diabetes medication and hypoglycemia.     He feels strength is better. He will see neurology this week Dr. Caicedo.       Review of Systems   Constitutional:  Negative for chills, diaphoresis and fever.   Respiratory:  Negative for shortness of breath.    Cardiovascular:  Negative for chest pain.   Gastrointestinal:  Negative for diarrhea, nausea and vomiting.   Genitourinary:  Negative for dysuria.   Neurological:  Negative for dizziness, syncope, weakness and light-headedness.       /60   Pulse 89   Temp 36.2 °C (97.1 °F) (Temporal)   Resp 16   Ht 1.778 m (5' 10\")   Wt 73 kg (161 lb)   SpO2 100%   BMI " 23.10 kg/m²     Physical Exam  Vitals reviewed.   Constitutional:       General: He is not in acute distress.     Appearance: Normal appearance.   HENT:      Head: Normocephalic.   Cardiovascular:      Rate and Rhythm: Normal rate and regular rhythm.   Pulmonary:      Effort: Pulmonary effort is normal. No respiratory distress.      Breath sounds: Normal breath sounds.   Abdominal:      General: There is no distension.   Musculoskeletal:         General: No swelling or deformity.   Skin:     Coloration: Skin is not jaundiced.   Neurological:      Mental Status: He is alert.         The complexity of medical decision making for this patient's transitional care is high.    Assessment/Plan   Problem List Items Addressed This Visit       Routine health maintenance    Diabetes mellitus without complication (Multi)    Myasthenia gravis (Multi)    RENE (acute kidney injury) (CMS-[facility])    Relevant Orders    CBC    Basic metabolic panel    Magnesium     Other Visit Diagnoses       Hyperkalemia    -  Primary    Relevant Orders    CBC          RENE  Hyperkalemia  - Repeat CBC, BMP, magnesium today  - Recommend he reach out to cardiology regarding when to restart ARB and spironolactone as has been managed by cardiology.  Would probably start low-dose ARB but ultimately defer to cardiology.  - Blood pressure well-controlled today  - Hospital course reviewed  - Follow-up with me as previously scheduled, sooner if needed    Myasthenia gravis  - Prednisone increased to 15 mg a day for follow-up and management per neurology.  He will be seen later this week    DM  - Follow-up and management per endocrinology.

## 2024-08-27 LAB
ANION GAP SERPL CALC-SCNC: 18 MMOL/L (ref 10–20)
BUN SERPL-MCNC: 54 MG/DL (ref 6–23)
CALCIUM SERPL-MCNC: 9.6 MG/DL (ref 8.6–10.6)
CHLORIDE SERPL-SCNC: 105 MMOL/L (ref 98–107)
CO2 SERPL-SCNC: 18 MMOL/L (ref 21–32)
CREAT SERPL-MCNC: 1.75 MG/DL (ref 0.5–1.3)
EGFRCR SERPLBLD CKD-EPI 2021: 39 ML/MIN/1.73M*2
ERYTHROCYTE [DISTWIDTH] IN BLOOD BY AUTOMATED COUNT: 15.2 % (ref 11.5–14.5)
GLUCOSE SERPL-MCNC: 52 MG/DL (ref 74–99)
HCT VFR BLD AUTO: 30.2 % (ref 41–52)
HGB BLD-MCNC: 9.7 G/DL (ref 13.5–17.5)
MAGNESIUM SERPL-MCNC: 1.69 MG/DL (ref 1.6–2.4)
MCH RBC QN AUTO: 27.6 PG (ref 26–34)
MCHC RBC AUTO-ENTMCNC: 32.1 G/DL (ref 32–36)
MCV RBC AUTO: 86 FL (ref 80–100)
NRBC BLD-RTO: 0 /100 WBCS (ref 0–0)
PLATELET # BLD AUTO: 169 X10*3/UL (ref 150–450)
POTASSIUM SERPL-SCNC: 4.1 MMOL/L (ref 3.5–5.3)
RBC # BLD AUTO: 3.51 X10*6/UL (ref 4.5–5.9)
SODIUM SERPL-SCNC: 137 MMOL/L (ref 136–145)
WBC # BLD AUTO: 6.2 X10*3/UL (ref 4.4–11.3)

## 2024-08-28 DIAGNOSIS — N18.31 CKD STAGE 3A, GFR 45-59 ML/MIN (MULTI): ICD-10-CM

## 2024-08-28 DIAGNOSIS — N17.9 AKI (ACUTE KIDNEY INJURY) (CMS-HCC): Primary | ICD-10-CM

## 2024-08-29 ENCOUNTER — OFFICE VISIT (OUTPATIENT)
Dept: WOUND CARE | Facility: CLINIC | Age: 79
End: 2024-08-29
Payer: MEDICARE

## 2024-08-29 PROCEDURE — 11042 DBRDMT SUBQ TIS 1ST 20SQCM/<: CPT

## 2024-09-03 ENCOUNTER — CLINICAL SUPPORT (OUTPATIENT)
Dept: WOUND CARE | Facility: CLINIC | Age: 79
End: 2024-09-03
Payer: MEDICARE

## 2024-09-03 PROCEDURE — 29581 APPL MULTLAYER CMPRN SYS LEG: CPT | Mod: LT

## 2024-09-05 ENCOUNTER — PATIENT OUTREACH (OUTPATIENT)
Dept: PRIMARY CARE | Facility: CLINIC | Age: 79
End: 2024-09-05
Payer: MEDICARE

## 2024-09-05 ENCOUNTER — OFFICE VISIT (OUTPATIENT)
Dept: WOUND CARE | Facility: CLINIC | Age: 79
End: 2024-09-05
Payer: MEDICARE

## 2024-09-05 PROCEDURE — 11042 DBRDMT SUBQ TIS 1ST 20SQCM/<: CPT

## 2024-09-05 NOTE — PROGRESS NOTES
Call regarding appt. with PCP on 8/26/2024 after hospitalization.  At time of outreach call the patient feels as if their condition has improved since last visit.  Reviewed the PCP appointment with the pt and addressed any questions or concerns.  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

## 2024-09-06 ENCOUNTER — LAB (OUTPATIENT)
Dept: LAB | Facility: LAB | Age: 79
End: 2024-09-06
Payer: MEDICARE

## 2024-09-06 DIAGNOSIS — N17.9 AKI (ACUTE KIDNEY INJURY) (CMS-HCC): ICD-10-CM

## 2024-09-06 DIAGNOSIS — N18.31 CKD STAGE 3A, GFR 45-59 ML/MIN (MULTI): Primary | ICD-10-CM

## 2024-09-06 LAB
ANION GAP SERPL CALC-SCNC: 13 MMOL/L (ref 10–20)
BUN SERPL-MCNC: 44 MG/DL (ref 6–23)
CALCIUM SERPL-MCNC: 9 MG/DL (ref 8.6–10.6)
CHLORIDE SERPL-SCNC: 106 MMOL/L (ref 98–107)
CO2 SERPL-SCNC: 24 MMOL/L (ref 21–32)
CREAT SERPL-MCNC: 1.35 MG/DL (ref 0.5–1.3)
EGFRCR SERPLBLD CKD-EPI 2021: 53 ML/MIN/1.73M*2
GLUCOSE SERPL-MCNC: 84 MG/DL (ref 74–99)
POTASSIUM SERPL-SCNC: 3.7 MMOL/L (ref 3.5–5.3)
SODIUM SERPL-SCNC: 139 MMOL/L (ref 136–145)

## 2024-09-06 PROCEDURE — 36415 COLL VENOUS BLD VENIPUNCTURE: CPT

## 2024-09-06 PROCEDURE — 80048 BASIC METABOLIC PNL TOTAL CA: CPT

## 2024-09-12 ENCOUNTER — OFFICE VISIT (OUTPATIENT)
Dept: WOUND CARE | Facility: CLINIC | Age: 79
End: 2024-09-12
Payer: MEDICARE

## 2024-09-12 PROCEDURE — 11042 DBRDMT SUBQ TIS 1ST 20SQCM/<: CPT

## 2024-09-12 PROCEDURE — 97597 DBRDMT OPN WND 1ST 20 CM/<: CPT | Mod: 59

## 2024-09-18 ENCOUNTER — LAB (OUTPATIENT)
Dept: LAB | Facility: LAB | Age: 79
End: 2024-09-18
Payer: MEDICARE

## 2024-09-18 DIAGNOSIS — N18.31 CKD STAGE 3A, GFR 45-59 ML/MIN (MULTI): ICD-10-CM

## 2024-09-18 DIAGNOSIS — G70.00 MYASTHENIA GRAVIS: Primary | ICD-10-CM

## 2024-09-18 PROCEDURE — 80048 BASIC METABOLIC PNL TOTAL CA: CPT

## 2024-09-18 PROCEDURE — 36415 COLL VENOUS BLD VENIPUNCTURE: CPT

## 2024-09-19 ENCOUNTER — OFFICE VISIT (OUTPATIENT)
Dept: WOUND CARE | Facility: CLINIC | Age: 79
End: 2024-09-19
Payer: MEDICARE

## 2024-09-19 LAB
ANION GAP SERPL CALC-SCNC: 20 MMOL/L (ref 10–20)
BUN SERPL-MCNC: 43 MG/DL (ref 6–23)
CALCIUM SERPL-MCNC: 8.8 MG/DL (ref 8.6–10.6)
CHLORIDE SERPL-SCNC: 105 MMOL/L (ref 98–107)
CO2 SERPL-SCNC: 19 MMOL/L (ref 21–32)
CREAT SERPL-MCNC: 1.19 MG/DL (ref 0.5–1.3)
EGFRCR SERPLBLD CKD-EPI 2021: 62 ML/MIN/1.73M*2
GLUCOSE SERPL-MCNC: 265 MG/DL (ref 74–99)
POTASSIUM SERPL-SCNC: 4.6 MMOL/L (ref 3.5–5.3)
SODIUM SERPL-SCNC: 139 MMOL/L (ref 136–145)

## 2024-09-19 PROCEDURE — 11042 DBRDMT SUBQ TIS 1ST 20SQCM/<: CPT

## 2024-09-19 PROCEDURE — 11720 DEBRIDE NAIL 1-5: CPT

## 2024-09-23 ENCOUNTER — OFFICE VISIT (OUTPATIENT)
Dept: WOUND CARE | Facility: CLINIC | Age: 79
End: 2024-09-23
Payer: MEDICARE

## 2024-09-23 PROCEDURE — 97597 DBRDMT OPN WND 1ST 20 CM/<: CPT

## 2024-09-23 PROCEDURE — 97598 DBRDMT OPN WND ADDL 20CM/<: CPT

## 2024-09-26 ENCOUNTER — OFFICE VISIT (OUTPATIENT)
Dept: WOUND CARE | Facility: CLINIC | Age: 79
End: 2024-09-26
Payer: MEDICARE

## 2024-09-26 PROCEDURE — 11042 DBRDMT SUBQ TIS 1ST 20SQCM/<: CPT

## 2024-09-26 PROCEDURE — 11045 DBRDMT SUBQ TISS EACH ADDL: CPT

## 2024-10-03 ENCOUNTER — OFFICE VISIT (OUTPATIENT)
Dept: WOUND CARE | Facility: CLINIC | Age: 79
End: 2024-10-03
Payer: MEDICARE

## 2024-10-03 PROCEDURE — 11042 DBRDMT SUBQ TIS 1ST 20SQCM/<: CPT

## 2024-10-03 PROCEDURE — 11045 DBRDMT SUBQ TISS EACH ADDL: CPT

## 2024-10-10 ENCOUNTER — OFFICE VISIT (OUTPATIENT)
Dept: WOUND CARE | Facility: CLINIC | Age: 79
End: 2024-10-10
Payer: MEDICARE

## 2024-10-10 ENCOUNTER — PATIENT OUTREACH (OUTPATIENT)
Dept: PRIMARY CARE | Facility: CLINIC | Age: 79
End: 2024-10-10
Payer: MEDICARE

## 2024-10-10 PROCEDURE — 11042 DBRDMT SUBQ TIS 1ST 20SQCM/<: CPT

## 2024-10-17 ENCOUNTER — OFFICE VISIT (OUTPATIENT)
Dept: WOUND CARE | Facility: CLINIC | Age: 79
End: 2024-10-17
Payer: MEDICARE

## 2024-10-17 PROCEDURE — 11042 DBRDMT SUBQ TIS 1ST 20SQCM/<: CPT

## 2024-10-24 ENCOUNTER — OFFICE VISIT (OUTPATIENT)
Dept: WOUND CARE | Facility: CLINIC | Age: 79
End: 2024-10-24
Payer: MEDICARE

## 2024-10-24 PROCEDURE — 11042 DBRDMT SUBQ TIS 1ST 20SQCM/<: CPT

## 2024-10-24 PROCEDURE — 10060 I&D ABSCESS SIMPLE/SINGLE: CPT | Mod: 59

## 2024-10-31 ENCOUNTER — OFFICE VISIT (OUTPATIENT)
Dept: WOUND CARE | Facility: CLINIC | Age: 79
End: 2024-10-31
Payer: MEDICARE

## 2024-10-31 PROCEDURE — 11042 DBRDMT SUBQ TIS 1ST 20SQCM/<: CPT

## 2024-11-07 ENCOUNTER — OFFICE VISIT (OUTPATIENT)
Dept: WOUND CARE | Facility: CLINIC | Age: 79
End: 2024-11-07
Payer: MEDICARE

## 2024-11-07 PROCEDURE — 99212 OFFICE O/P EST SF 10 MIN: CPT

## 2024-11-14 ENCOUNTER — OFFICE VISIT (OUTPATIENT)
Dept: WOUND CARE | Facility: CLINIC | Age: 79
End: 2024-11-14
Payer: MEDICARE

## 2024-11-14 PROCEDURE — 11042 DBRDMT SUBQ TIS 1ST 20SQCM/<: CPT

## 2024-11-20 ENCOUNTER — PATIENT OUTREACH (OUTPATIENT)
Dept: PRIMARY CARE | Facility: CLINIC | Age: 79
End: 2024-11-20
Payer: MEDICARE

## 2024-11-21 ENCOUNTER — OFFICE VISIT (OUTPATIENT)
Dept: WOUND CARE | Facility: CLINIC | Age: 79
End: 2024-11-21
Payer: MEDICARE

## 2024-11-21 PROCEDURE — 11042 DBRDMT SUBQ TIS 1ST 20SQCM/<: CPT

## 2024-11-25 ENCOUNTER — LAB (OUTPATIENT)
Dept: LAB | Facility: LAB | Age: 79
End: 2024-11-25
Payer: MEDICARE

## 2024-11-25 DIAGNOSIS — E11.65 TYPE 2 DIABETES MELLITUS WITH HYPERGLYCEMIA (MULTI): Primary | ICD-10-CM

## 2024-11-25 DIAGNOSIS — C61 MALIGNANT NEOPLASM OF PROSTATE (MULTI): ICD-10-CM

## 2024-11-25 DIAGNOSIS — E78.5 HYPERLIPIDEMIA, UNSPECIFIED: ICD-10-CM

## 2024-11-25 LAB
EST. AVERAGE GLUCOSE BLD GHB EST-MCNC: 117 MG/DL
HBA1C MFR BLD: 5.7 %

## 2024-11-25 PROCEDURE — 36415 COLL VENOUS BLD VENIPUNCTURE: CPT

## 2024-11-25 PROCEDURE — 84154 ASSAY OF PSA FREE: CPT

## 2024-11-25 PROCEDURE — 83036 HEMOGLOBIN GLYCOSYLATED A1C: CPT

## 2024-11-25 PROCEDURE — 84443 ASSAY THYROID STIM HORMONE: CPT

## 2024-11-25 PROCEDURE — 80061 LIPID PANEL: CPT

## 2024-11-25 PROCEDURE — 80053 COMPREHEN METABOLIC PANEL: CPT

## 2024-11-25 PROCEDURE — 84153 ASSAY OF PSA TOTAL: CPT

## 2024-11-26 ENCOUNTER — APPOINTMENT (OUTPATIENT)
Dept: PRIMARY CARE | Facility: CLINIC | Age: 79
End: 2024-11-26
Payer: MEDICARE

## 2024-11-26 VITALS
TEMPERATURE: 97.9 F | DIASTOLIC BLOOD PRESSURE: 62 MMHG | OXYGEN SATURATION: 99 % | HEART RATE: 99 BPM | SYSTOLIC BLOOD PRESSURE: 118 MMHG | HEIGHT: 70 IN | WEIGHT: 160 LBS | RESPIRATION RATE: 16 BRPM | BODY MASS INDEX: 22.9 KG/M2

## 2024-11-26 DIAGNOSIS — D64.9 ANEMIA, UNSPECIFIED TYPE: ICD-10-CM

## 2024-11-26 DIAGNOSIS — C61 PROSTATE CANCER (MULTI): ICD-10-CM

## 2024-11-26 DIAGNOSIS — I25.10 CORONARY ARTERY DISEASE INVOLVING NATIVE CORONARY ARTERY OF NATIVE HEART WITHOUT ANGINA PECTORIS: ICD-10-CM

## 2024-11-26 DIAGNOSIS — E87.5 HYPERKALEMIA: Primary | ICD-10-CM

## 2024-11-26 DIAGNOSIS — N28.1 COMPLEX RENAL CYST: ICD-10-CM

## 2024-11-26 DIAGNOSIS — T38.0X5A IMMUNOSUPPRESSION DUE TO CHRONIC STEROID USE: ICD-10-CM

## 2024-11-26 DIAGNOSIS — R73.9 STEROID-INDUCED HYPERGLYCEMIA: ICD-10-CM

## 2024-11-26 DIAGNOSIS — T38.0X5A STEROID-INDUCED HYPERGLYCEMIA: ICD-10-CM

## 2024-11-26 DIAGNOSIS — N17.9 AKI (ACUTE KIDNEY INJURY) (CMS-HCC): ICD-10-CM

## 2024-11-26 DIAGNOSIS — D84.821 IMMUNOSUPPRESSION DUE TO CHRONIC STEROID USE: ICD-10-CM

## 2024-11-26 DIAGNOSIS — N20.0 KIDNEY STONES: ICD-10-CM

## 2024-11-26 DIAGNOSIS — N18.31 STAGE 3A CHRONIC KIDNEY DISEASE (MULTI): ICD-10-CM

## 2024-11-26 DIAGNOSIS — I10 PRIMARY HYPERTENSION: ICD-10-CM

## 2024-11-26 DIAGNOSIS — Z79.52 IMMUNOSUPPRESSION DUE TO CHRONIC STEROID USE: ICD-10-CM

## 2024-11-26 DIAGNOSIS — G70.00 MYASTHENIA GRAVIS: ICD-10-CM

## 2024-11-26 LAB
ALBUMIN SERPL BCP-MCNC: 4.3 G/DL (ref 3.4–5)
ALP SERPL-CCNC: 49 U/L (ref 33–136)
ALT SERPL W P-5'-P-CCNC: 8 U/L (ref 10–52)
ANION GAP SERPL CALC-SCNC: 19 MMOL/L (ref 10–20)
AST SERPL W P-5'-P-CCNC: 13 U/L (ref 9–39)
BILIRUB SERPL-MCNC: 0.5 MG/DL (ref 0–1.2)
BUN SERPL-MCNC: 50 MG/DL (ref 6–23)
CALCIUM SERPL-MCNC: 9.9 MG/DL (ref 8.6–10.6)
CHLORIDE SERPL-SCNC: 100 MMOL/L (ref 98–107)
CHOLEST SERPL-MCNC: 196 MG/DL (ref 0–199)
CHOLESTEROL/HDL RATIO: 3.8
CO2 SERPL-SCNC: 24 MMOL/L (ref 21–32)
CREAT SERPL-MCNC: 1.69 MG/DL (ref 0.5–1.3)
EGFRCR SERPLBLD CKD-EPI 2021: 41 ML/MIN/1.73M*2
GLUCOSE SERPL-MCNC: 92 MG/DL (ref 74–99)
HDLC SERPL-MCNC: 51.4 MG/DL
LDLC SERPL CALC-MCNC: 115 MG/DL
NON HDL CHOLESTEROL: 145 MG/DL (ref 0–149)
POTASSIUM SERPL-SCNC: 5.5 MMOL/L (ref 3.5–5.3)
PROT SERPL-MCNC: 6.2 G/DL (ref 6.4–8.2)
SODIUM SERPL-SCNC: 137 MMOL/L (ref 136–145)
TRIGL SERPL-MCNC: 148 MG/DL (ref 0–149)
TSH SERPL-ACNC: 2.43 MIU/L (ref 0.44–3.98)
VLDL: 30 MG/DL (ref 0–40)

## 2024-11-26 PROCEDURE — 99214 OFFICE O/P EST MOD 30 MIN: CPT | Performed by: STUDENT IN AN ORGANIZED HEALTH CARE EDUCATION/TRAINING PROGRAM

## 2024-11-26 RX ORDER — LOSARTAN POTASSIUM 50 MG/1
50 TABLET ORAL DAILY
COMMUNITY

## 2024-11-26 ASSESSMENT — ENCOUNTER SYMPTOMS
DIAPHORESIS: 0
VOMITING: 0
DYSURIA: 0
NAUSEA: 0
CHILLS: 0
PALPITATIONS: 0
SHORTNESS OF BREATH: 0
LIGHT-HEADEDNESS: 0
FEVER: 0
DIZZINESS: 0
DIARRHEA: 0

## 2024-11-26 NOTE — PROGRESS NOTES
"Subjective   Patient ID: Guero Cope \"Colby" is a 79 y.o. male who presents for Follow-up.  HPI    Since our last appointment he has followed up with neurology for myasthenia gravis.  His prednisone is being weaned and he was continued on CellCept.    Seeing Dr. Fay next week and seeing neurology in Jan 2025 and Dr. Roque in Jan.       Review of Systems   Constitutional:  Negative for chills, diaphoresis and fever.   Respiratory:  Negative for shortness of breath.    Cardiovascular:  Negative for chest pain, palpitations and leg swelling.   Gastrointestinal:  Negative for diarrhea, nausea and vomiting.   Genitourinary:  Negative for dysuria.   Neurological:  Negative for dizziness, syncope and light-headedness.       /62   Pulse 99   Temp 36.6 °C (97.9 °F)   Resp 16   Ht 1.778 m (5' 10\")   Wt 72.6 kg (160 lb)   SpO2 99%   BMI 22.96 kg/m²     Objective   Physical Exam  Vitals reviewed.   Constitutional:       General: He is not in acute distress.     Appearance: Normal appearance.   HENT:      Head: Normocephalic.   Cardiovascular:      Rate and Rhythm: Normal rate and regular rhythm.   Pulmonary:      Effort: Pulmonary effort is normal. No respiratory distress.      Breath sounds: Normal breath sounds.   Abdominal:      General: There is no distension.   Musculoskeletal:         General: No deformity.   Skin:     Coloration: Skin is not jaundiced.   Neurological:      Mental Status: He is alert.         Assessment/Plan   Problem List Items Addressed This Visit       Complex renal cyst    Anemia    Kidney stones    Prostate cancer (Multi)    Primary hypertension    Coronary artery disease involving native coronary artery of native heart without angina pectoris    Stage 3a chronic kidney disease (Multi)    Steroid-induced hyperglycemia    Myasthenia gravis    Immunosuppression due to chronic steroid use    RENE (acute kidney injury) (CMS-[facility])    Relevant Orders    Basic metabolic panel    " Hyperkalemia - Primary    Relevant Orders    Basic metabolic panel       Fatigue  Thrombocytopenia-resolved  Anemia  - Labs stable, f/u per neuro     Skin tears  -F/u wound care, better as of recent     RENE  Hyperkalemia  - Was admitted August 2024.  -Mild hyperkalemia on recent labs.  - Had reached out to his cardiologist who restarted losartan 50 mg.  -Recommended he decrease losartan to 25 mg a day, recheck BMP in about 4 days and recheck to cardiology who is managing his losartan.  -Again deferred referral to nephrology, was recommended to him today again.     Myasthenia gravis  -Multiple admissions at [facility]  -Interval history per Dr. Caicedo with neurology: He was admitted at  from 8/27/23 to 9/9/23 for MG flare, presented with worsening of speech(losing his voice). He tried IVIG (2 doses) without benefit, had worsening of secretions and hypoxia needing intubation(x5 days). Had 5 cycles of PLEX (8/31 to 9/11/23) which helped, started on mycophenolate (500 mg bid).   -S/p removal of right sided TDC  -  Per neuro weaning prednisone on CellCept 1 g twice daily.  He was on vygart.  -Continue f/u neurology     Primary hypertension  -On losartan 50 mg a day, amlodipine 10 mg a day, follow-up with cardiology.  -Controlled today  -Decrease losartan to 25mg/day.      Coronary artery disease  PAD  Hyperlipidemia  -Continue management per cardiology including rosuvastatin 20 mg a day, aspirin 81 mg a day.   -Management per Dr. Roque     Kidney stones  -On allopurinol, may need dose adjustment in light of kidney function, defer to urology and will see next week.   -Normal uric acid 3/24  -F/u urology     Renal cyst  -Seen on imaging in hospitalization, advise f/u with urology     Prostate cancer  - Continue follow-up with Dr. Fay.  Flomax, Myrbetriq, and further management per urology.     Stage IIIa chronic kidney disease  - CKD noted in EMR, GFR is 46  -See above elevated creatinine  -U/s kidneys reviewed previously      Hyperglycemia  -F/u endocrinology, Dr. Greene.  - On insulin glargine, Humalog with sliding scale, metformin  -A1c 5.7 as of yesterday.   -BS well controlled, has dexcom.   -Urine albumin normal 8/24.   -F/u and management per endocrinology     Insomnia  -Sleep is good now     Cuadra's esophagus  Hiatal hernia  -on dexilant  -Hiatal hernia seen on CT chest 7/23  -Reflux controlled.      Mild hepatic steatosis seen on U/s abdomen and spleen. Referred to hepatology previously.     Lower extremity edema-resolved  -Connected with his cardiologist Dr. Roque  -Was diuresed and repeat echo showed EF 55%, repeat in 1-2 years. No longer on lasix.      Status post lumbar fusion  -Continue follow-up per Naz Murphy and Dr. Main, plan per them.    -Saw 1/22/24 and will see 5 months with xrays.   -No low back pain.     Health Maintenance  -Prostate Cancer Screening: Per urology  -Vaccinations: Reports UTD pneumonia vaccine in last 2 years, flu vaccine, shingles, covid vaccination and booster, UTD TDAP, due 2026  -Lung Cancer Screening: Not indicated due to age  -AAA Screening: Not indicated due to age  -Colonoscopy: Due 2025     Follow-up 3 months, sooner PRN

## 2024-11-27 LAB
PSA FREE MFR SERPL: 5 %
PSA FREE SERPL-MCNC: 0.4 NG/ML
PSA SERPL IA-MCNC: 7.5 NG/ML (ref 0–4)

## 2024-12-03 DIAGNOSIS — R73.9 STEROID-INDUCED HYPERGLYCEMIA: ICD-10-CM

## 2024-12-03 DIAGNOSIS — R73.9 HYPERGLYCEMIA: ICD-10-CM

## 2024-12-03 DIAGNOSIS — T38.0X5A STEROID-INDUCED HYPERGLYCEMIA: ICD-10-CM

## 2024-12-03 RX ORDER — BLOOD-GLUCOSE SENSOR
EACH MISCELLANEOUS
Qty: 2 EACH | Refills: 11 | Status: SHIPPED | OUTPATIENT
Start: 2024-12-03

## 2024-12-05 ENCOUNTER — OFFICE VISIT (OUTPATIENT)
Dept: WOUND CARE | Facility: CLINIC | Age: 79
End: 2024-12-05
Payer: MEDICARE

## 2024-12-05 PROCEDURE — 99212 OFFICE O/P EST SF 10 MIN: CPT

## 2024-12-06 ENCOUNTER — LAB (OUTPATIENT)
Dept: LAB | Facility: LAB | Age: 79
End: 2024-12-06
Payer: MEDICARE

## 2024-12-06 DIAGNOSIS — E87.5 HYPERKALEMIA: ICD-10-CM

## 2024-12-06 DIAGNOSIS — R73.9 STEROID-INDUCED HYPERGLYCEMIA: ICD-10-CM

## 2024-12-06 DIAGNOSIS — N17.9 AKI (ACUTE KIDNEY INJURY) (CMS-HCC): ICD-10-CM

## 2024-12-06 DIAGNOSIS — T38.0X5A STEROID-INDUCED HYPERGLYCEMIA: ICD-10-CM

## 2024-12-06 PROCEDURE — 80048 BASIC METABOLIC PNL TOTAL CA: CPT

## 2024-12-06 PROCEDURE — 36415 COLL VENOUS BLD VENIPUNCTURE: CPT

## 2024-12-06 RX ORDER — BLOOD SUGAR DIAGNOSTIC
STRIP MISCELLANEOUS
Qty: 400 EACH | Refills: 3 | Status: SHIPPED | OUTPATIENT
Start: 2024-12-06

## 2024-12-06 NOTE — TELEPHONE ENCOUNTER
Patient needs a refill on pen needles/pharmacy [facility] in [place]. 90-days The other prescription  last week

## 2024-12-07 LAB
ANION GAP SERPL CALC-SCNC: 19 MMOL/L (ref 10–20)
BUN SERPL-MCNC: 59 MG/DL (ref 6–23)
CALCIUM SERPL-MCNC: 9.6 MG/DL (ref 8.6–10.6)
CHLORIDE SERPL-SCNC: 101 MMOL/L (ref 98–107)
CO2 SERPL-SCNC: 22 MMOL/L (ref 21–32)
CREAT SERPL-MCNC: 1.75 MG/DL (ref 0.5–1.3)
EGFRCR SERPLBLD CKD-EPI 2021: 39 ML/MIN/1.73M*2
GLUCOSE SERPL-MCNC: 141 MG/DL (ref 74–99)
POTASSIUM SERPL-SCNC: 4.5 MMOL/L (ref 3.5–5.3)
SODIUM SERPL-SCNC: 137 MMOL/L (ref 136–145)

## 2025-01-07 ENCOUNTER — APPOINTMENT (OUTPATIENT)
Dept: NEPHROLOGY | Facility: CLINIC | Age: 80
End: 2025-01-07
Payer: MEDICARE

## 2025-01-07 VITALS — DIASTOLIC BLOOD PRESSURE: 83 MMHG | SYSTOLIC BLOOD PRESSURE: 135 MMHG | HEART RATE: 101 BPM

## 2025-01-07 DIAGNOSIS — I10 ESSENTIAL HYPERTENSION: Primary | ICD-10-CM

## 2025-01-07 DIAGNOSIS — N18.31 CKD STAGE 3A, GFR 45-59 ML/MIN (MULTI): ICD-10-CM

## 2025-01-07 DIAGNOSIS — N17.9 AKI (ACUTE KIDNEY INJURY) (CMS-HCC): ICD-10-CM

## 2025-01-07 PROCEDURE — 1036F TOBACCO NON-USER: CPT | Performed by: INTERNAL MEDICINE

## 2025-01-07 PROCEDURE — 99203 OFFICE O/P NEW LOW 30 MIN: CPT | Performed by: INTERNAL MEDICINE

## 2025-01-07 PROCEDURE — 1157F ADVNC CARE PLAN IN RCRD: CPT | Performed by: INTERNAL MEDICINE

## 2025-01-07 PROCEDURE — 3075F SYST BP GE 130 - 139MM HG: CPT | Performed by: INTERNAL MEDICINE

## 2025-01-07 PROCEDURE — 3079F DIAST BP 80-89 MM HG: CPT | Performed by: INTERNAL MEDICINE

## 2025-01-07 PROCEDURE — 1159F MED LIST DOCD IN RCRD: CPT | Performed by: INTERNAL MEDICINE

## 2025-01-07 NOTE — PROGRESS NOTES
"Guero Cope   79 y.o.      Vitals:      MRN/Room: [id number]/Room/bed info not found      History Of Present Illness  Guero Cope \"Fan\" is a 79 y.o. male presenting with high Cr level.     Past Medical History  He has a past medical history of Allergic, Arthritis, Cancer (Multi), GERD (gastroesophageal reflux disease), Hypertension, and Myocardial infarction (Multi).    Surgical History  He has a past surgical history that includes Back surgery; Coronary stent placement; Joint replacement; Spine surgery; and IR CVC PICC (12/8/2023).     Social History  He reports that he has never smoked. He has never used smokeless tobacco. He reports current alcohol use of about 1.0 standard drink of alcohol per week. He reports that he does not use drugs.    Family History  Family History   Adopted: Yes        Allergies  Sulfa (sulfonamide antibiotics)      Meds:         Current Outpatient Medications   Medication Sig Dispense Refill    allopurinol (Zyloprim) 300 mg tablet Take 1 tablet (300 mg) by mouth once daily.      amLODIPine (Norvasc) 10 mg tablet Take 1 tablet (10 mg) by mouth once daily.      aspirin 81 mg EC tablet Take 1 tablet (81 mg) by mouth once daily.      coenzyme Q-10 100 mg capsule Take 1 capsule (100 mg) by mouth once daily.      fluticasone (Flonase) 50 mcg/actuation nasal spray Administer 1 spray into each nostril once daily.      insulin lispro (HumaLOG KwikPen Insulin) 100 unit/mL injection Inject as directed under the skin 14 units with breakfast, 22 units with lunch and 14 units with dinner plus sliding scale up to 86 units per day. 15 mL 5    losartan (Cozaar) 50 mg tablet Take 1 tablet (50 mg) by mouth once daily. (Patient taking differently: Take 0.5 tablets (25 mg) by mouth once daily.)      metFORMIN  mg 24 hr tablet Take 3 tablets (1,500 mg) by mouth once daily in the evening. Take with meals. Do not crush, chew, or split. 270 tablet 2    mycophenolate (Cellcept) 500 mg tablet Take 2 " "tablets (1,000 mg) by mouth 2 times a day.      pen needle, diabetic (BD Ultra-Fine Micro Pen Needle) 32 gauge x 1/4\" needle Use as directed four times daily with insulin. 400 each 3    predniSONE (Deltasone) 2.5 mg tablet Take 1 tablet (2.5 mg) by mouth once daily. TAKE 2.5 MG IN ADDITION TO 5 MG FOR A TOTAL OF 7.5 MG DAILY.      tamsulosin (Flomax) 0.4 mg 24 hr capsule Take 2 capsules (0.8 mg) by mouth once daily.      TURMERIC ORAL Take by mouth. OTC       No current facility-administered medications for this visit.         ROS:  The patient is awake and oriented. No dizziness or lightheadedness. No chills and no fever. No headaches. No nausea and no vomiting. No shortness of breath. No cough. No chest pain. No abdominal pain. No diarrhea. No hematemesis or hemoptysis. No hematuria. No rectal bleeding. No melena. No epistaxis. No urinary symptoms. No flank pain. + leg edema. No leg pain. No itching. Overall, the rest of the review of systems is also negative.  12 point review of systems otherwise negative as stated in HPI.        Physical Exam:        Vitals:    01/07/25 1357   BP: 135/83   Pulse: 101     General: The patient is awake, oriented, and is not in any distress.  Head and Neck: Normocephalic. No periorbital edema.  Eyes: Not icteric.   Respiratory: Symmetric chest expansion. No respiratory distress.  Skin: No maculopapular rash.  Musculoskeletal: 1-2+ leg edema.  Neuro Exam: Speech is fluent. Moves extremities.        Blood Labs:  No results found. However, due to the size of the patient record, not all encounters were searched. Please check Results Review for a complete set of results.   Lab Results   Component Value Date    GLUCOSE 141 (H) 12/06/2024    CALCIUM 9.6 12/06/2024     12/06/2024    K 4.5 12/06/2024    CO2 22 12/06/2024     12/06/2024    BUN 59 (H) 12/06/2024    CREATININE 1.75 (H) 12/06/2024       Imaging:  === 05/28/24 ===    US RENAL COMPLETE    - Impression -  Simple and  " minimally complex likely benign renal cysts bilaterally.  No hydronephrosis.    MACRO:  None    Signed by: Nancy Petersen 5/29/2024 10:12 PM  Dictation workstation:   [id number]      Assessment and Plan:  #1 chronic kidney disease stage III.  Last creatinine was 1.35.  The patient has history of diabetes and hypertension.  He also has history of coronary artery disease.  I asked for a kidney ultrasound, PTH, 25-hydroxy vitamin D, microscopic urinalysis, and a spot urine protein to creatinine ratio.    2.  Diabetes.  I asked for spot urine protein to creatinine ratio.    3.  Hypertension.  Blood pressure is acceptable.  Continue the current medications.    I will see him in about 2 to 3 weeks for follow-up    Neil Hernandez MD  Senior Attending Physician  Director of Onco-Nephrology Program  Division of Nephrology & Hypertension  [facility]

## 2025-01-10 ENCOUNTER — HOSPITAL ENCOUNTER (OUTPATIENT)
Dept: RADIOLOGY | Facility: CLINIC | Age: 80
Discharge: HOME | End: 2025-01-10
Payer: MEDICARE

## 2025-01-10 ENCOUNTER — LAB (OUTPATIENT)
Dept: LAB | Facility: LAB | Age: 80
End: 2025-01-10
Payer: MEDICARE

## 2025-01-10 DIAGNOSIS — I10 ESSENTIAL HYPERTENSION: ICD-10-CM

## 2025-01-10 DIAGNOSIS — N18.31 CKD STAGE 3A, GFR 45-59 ML/MIN (MULTI): ICD-10-CM

## 2025-01-10 LAB
25(OH)D3 SERPL-MCNC: 54 NG/ML (ref 30–100)
ANION GAP SERPL CALC-SCNC: 16 MMOL/L (ref 10–20)
BUN SERPL-MCNC: 41 MG/DL (ref 6–23)
CALCIUM SERPL-MCNC: 9.3 MG/DL (ref 8.6–10.6)
CHLORIDE SERPL-SCNC: 102 MMOL/L (ref 98–107)
CO2 SERPL-SCNC: 24 MMOL/L (ref 21–32)
CREAT SERPL-MCNC: 1.53 MG/DL (ref 0.5–1.3)
EGFRCR SERPLBLD CKD-EPI 2021: 46 ML/MIN/1.73M*2
GLUCOSE SERPL-MCNC: 209 MG/DL (ref 74–99)
POTASSIUM SERPL-SCNC: 5.4 MMOL/L (ref 3.5–5.3)
PTH-INTACT SERPL-MCNC: 35.3 PG/ML (ref 18.5–88)
SODIUM SERPL-SCNC: 137 MMOL/L (ref 136–145)

## 2025-01-10 PROCEDURE — 82306 VITAMIN D 25 HYDROXY: CPT

## 2025-01-10 PROCEDURE — 80048 BASIC METABOLIC PNL TOTAL CA: CPT

## 2025-01-10 PROCEDURE — 83970 ASSAY OF PARATHORMONE: CPT

## 2025-01-10 PROCEDURE — 76770 US EXAM ABDO BACK WALL COMP: CPT

## 2025-01-13 ENCOUNTER — TELEPHONE (OUTPATIENT)
Dept: NEPHROLOGY | Facility: CLINIC | Age: 80
End: 2025-01-13
Payer: MEDICARE

## 2025-01-13 NOTE — TELEPHONE ENCOUNTER
----- Message from Neil Hernandez sent at 1/13/2025  8:29 AM EST -----  No major change in kidney function.  Potassium level is marginally on high side.  He needs to follow low potassium diet.

## 2025-01-13 NOTE — TELEPHONE ENCOUNTER
----- Message from Neil Hernandez sent at 1/13/2025  8:31 AM EST -----  Kidney ultrasound shows Bosniak 2 cyst as well as kidney stones.  I put follow-up ultrasound to be done in 6 months and I also put referral for urology.

## 2025-01-14 ENCOUNTER — LAB (OUTPATIENT)
Dept: LAB | Facility: LAB | Age: 80
End: 2025-01-14
Payer: MEDICARE

## 2025-01-14 DIAGNOSIS — N18.31 CKD STAGE 3A, GFR 45-59 ML/MIN (MULTI): ICD-10-CM

## 2025-01-14 DIAGNOSIS — I10 ESSENTIAL HYPERTENSION: ICD-10-CM

## 2025-01-14 LAB
CREAT UR-MCNC: 50.2 MG/DL (ref 20–370)
PROT UR-ACNC: 5 MG/DL (ref 5–25)
PROT/CREAT UR: 0.1 MG/MG CREAT (ref 0–0.17)
RBC #/AREA URNS AUTO: NORMAL /HPF
WBC #/AREA URNS AUTO: NORMAL /HPF

## 2025-01-14 PROCEDURE — 81001 URINALYSIS AUTO W/SCOPE: CPT

## 2025-01-14 PROCEDURE — 84156 ASSAY OF PROTEIN URINE: CPT

## 2025-01-14 PROCEDURE — 82570 ASSAY OF URINE CREATININE: CPT

## 2025-01-15 ENCOUNTER — TELEPHONE (OUTPATIENT)
Dept: NEPHROLOGY | Facility: CLINIC | Age: 80
End: 2025-01-15
Payer: MEDICARE

## 2025-01-15 NOTE — TELEPHONE ENCOUNTER
----- Message from Neil Hernandez sent at 1/15/2025 11:00 AM EST -----  1.  No protein in urine.  2.  No microscopic blood in urine.

## 2025-01-22 ENCOUNTER — OFFICE VISIT (OUTPATIENT)
Dept: PRIMARY CARE | Facility: CLINIC | Age: 80
End: 2025-01-22
Payer: MEDICARE

## 2025-01-22 ENCOUNTER — APPOINTMENT (OUTPATIENT)
Dept: NEPHROLOGY | Facility: CLINIC | Age: 80
End: 2025-01-22
Payer: MEDICARE

## 2025-01-22 VITALS
DIASTOLIC BLOOD PRESSURE: 75 MMHG | OXYGEN SATURATION: 96 % | SYSTOLIC BLOOD PRESSURE: 128 MMHG | HEART RATE: 84 BPM | TEMPERATURE: 97.8 F

## 2025-01-22 VITALS
DIASTOLIC BLOOD PRESSURE: 74 MMHG | BODY MASS INDEX: 22.9 KG/M2 | HEART RATE: 108 BPM | HEIGHT: 70 IN | SYSTOLIC BLOOD PRESSURE: 125 MMHG | WEIGHT: 160 LBS

## 2025-01-22 DIAGNOSIS — I10 ESSENTIAL HYPERTENSION: ICD-10-CM

## 2025-01-22 DIAGNOSIS — Z98.1 S/P LUMBAR SPINAL FUSION: ICD-10-CM

## 2025-01-22 DIAGNOSIS — C61 PROSTATE CANCER (MULTI): ICD-10-CM

## 2025-01-22 DIAGNOSIS — Q61.8 OTHER CYSTIC KIDNEY DISEASES: ICD-10-CM

## 2025-01-22 DIAGNOSIS — N18.31 CKD STAGE 3A, GFR 45-59 ML/MIN (MULTI): ICD-10-CM

## 2025-01-22 DIAGNOSIS — L03.115 CELLULITIS OF RIGHT LEG: Primary | ICD-10-CM

## 2025-01-22 DIAGNOSIS — L97.828: ICD-10-CM

## 2025-01-22 DIAGNOSIS — S81.812A NONINFECTED SKIN TEAR OF LEFT LOWER EXTREMITY, INITIAL ENCOUNTER: ICD-10-CM

## 2025-01-22 DIAGNOSIS — N18.31 CKD STAGE 3A, GFR 45-59 ML/MIN (MULTI): Primary | ICD-10-CM

## 2025-01-22 PROCEDURE — 1160F RVW MEDS BY RX/DR IN RCRD: CPT | Performed by: STUDENT IN AN ORGANIZED HEALTH CARE EDUCATION/TRAINING PROGRAM

## 2025-01-22 PROCEDURE — 3074F SYST BP LT 130 MM HG: CPT | Performed by: INTERNAL MEDICINE

## 2025-01-22 PROCEDURE — 1157F ADVNC CARE PLAN IN RCRD: CPT | Performed by: STUDENT IN AN ORGANIZED HEALTH CARE EDUCATION/TRAINING PROGRAM

## 2025-01-22 PROCEDURE — 1159F MED LIST DOCD IN RCRD: CPT | Performed by: STUDENT IN AN ORGANIZED HEALTH CARE EDUCATION/TRAINING PROGRAM

## 2025-01-22 PROCEDURE — 1157F ADVNC CARE PLAN IN RCRD: CPT | Performed by: INTERNAL MEDICINE

## 2025-01-22 PROCEDURE — 3074F SYST BP LT 130 MM HG: CPT | Performed by: STUDENT IN AN ORGANIZED HEALTH CARE EDUCATION/TRAINING PROGRAM

## 2025-01-22 PROCEDURE — 99214 OFFICE O/P EST MOD 30 MIN: CPT | Performed by: STUDENT IN AN ORGANIZED HEALTH CARE EDUCATION/TRAINING PROGRAM

## 2025-01-22 PROCEDURE — 3078F DIAST BP <80 MM HG: CPT | Performed by: STUDENT IN AN ORGANIZED HEALTH CARE EDUCATION/TRAINING PROGRAM

## 2025-01-22 PROCEDURE — 3078F DIAST BP <80 MM HG: CPT | Performed by: INTERNAL MEDICINE

## 2025-01-22 PROCEDURE — G2211 COMPLEX E/M VISIT ADD ON: HCPCS | Performed by: STUDENT IN AN ORGANIZED HEALTH CARE EDUCATION/TRAINING PROGRAM

## 2025-01-22 PROCEDURE — 99214 OFFICE O/P EST MOD 30 MIN: CPT | Performed by: INTERNAL MEDICINE

## 2025-01-22 PROCEDURE — 1036F TOBACCO NON-USER: CPT | Performed by: INTERNAL MEDICINE

## 2025-01-22 RX ORDER — CIPROFLOXACIN 500 MG/1
500 TABLET ORAL
COMMUNITY
Start: 2025-01-17

## 2025-01-22 RX ORDER — TRIAMCINOLONE ACETONIDE 1 MG/G
CREAM TOPICAL
COMMUNITY
Start: 2024-12-25

## 2025-01-22 RX ORDER — TACROLIMUS 1 MG/G
OINTMENT TOPICAL
COMMUNITY
Start: 2024-08-02

## 2025-01-22 RX ORDER — DICLOFENAC SODIUM 10 MG/G
GEL TOPICAL
COMMUNITY
Start: 2024-06-23

## 2025-01-22 RX ORDER — FUROSEMIDE 20 MG/1
1 TABLET ORAL
COMMUNITY
Start: 2024-12-18

## 2025-01-22 RX ORDER — CEPHALEXIN 500 MG/1
500 CAPSULE ORAL 4 TIMES DAILY
Qty: 28 CAPSULE | Refills: 0 | Status: SHIPPED | OUTPATIENT
Start: 2025-01-22 | End: 2025-01-29

## 2025-01-22 RX ORDER — TROSPIUM CHLORIDE ER 60 MG/1
1 CAPSULE ORAL
COMMUNITY
Start: 2024-12-30

## 2025-01-22 RX ORDER — KETOCONAZOLE 20 MG/ML
SHAMPOO, SUSPENSION TOPICAL
COMMUNITY
Start: 2024-10-08

## 2025-01-22 ASSESSMENT — ENCOUNTER SYMPTOMS
DIAPHORESIS: 0
PALPITATIONS: 0
FEVER: 0
DIZZINESS: 0
NAUSEA: 0
CHILLS: 0
SHORTNESS OF BREATH: 0
LIGHT-HEADEDNESS: 0
DIARRHEA: 0
VOMITING: 0

## 2025-01-22 NOTE — PROGRESS NOTES
"Subjective   Patient ID: Guero Cope \"Fan\" is a 79 y.o. male who presents for Inflammation in leg (Patient is here for inflammation in both legs. ).  HPI    He has had bilateral leg swelling. He has been trying compression stockings for this. This started 1 week ago. Losartan dose was reduced. He has been off lasix. Bumped into the side of his leg putting away [employer] boxes and was 1 week ago. This was on the right leg. He has another wound on the left leg that he noticed one night after waking up. He has been using xeroform and adhesive dressing for this. No pus noted. He still feels unstable walking intermittently.      Has hx of back surgery and gets numbness if he bends forward and resolves when he stands up.     He is getting high dose radiation for the prostate as it has returned.     Review of Systems   Constitutional:  Negative for chills, diaphoresis and fever.   Respiratory:  Negative for shortness of breath.    Cardiovascular:  Positive for leg swelling. Negative for chest pain and palpitations.   Gastrointestinal:  Negative for diarrhea, nausea and vomiting.   Neurological:  Negative for dizziness, syncope and light-headedness.       /75 (BP Location: Left arm, Patient Position: Sitting, BP Cuff Size: Adult)   Pulse 84   Temp 36.6 °C (97.8 °F) (Temporal)   SpO2 96%     Objective   Physical Exam    Assessment/Plan   {Assess/PlanSmartLinks:32504}  " lower leg wound with surrounding erythema and edema and approximately 3 to 4 cm in diameter.  No purulence present.      Skin tear present to left lower extremity at approximately 3 cm in length.   Neurological:      Mental Status: He is alert.         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    Prostate cancer  Back pain  - Follow-up with urology    Status post lumbar fusion   Back pain  -Advised f/u Dr. Main     CKD  - Refer to additional nephrologist at patient request    Follow-up 1 month

## 2025-01-22 NOTE — PROGRESS NOTES
"Guero Cope   79 y.o.    @@  [facility]/Room: [id number]/Room/bed info not found    Subjective:   The patient is being seen for a routine clinic follow-up of chronic kidney disease. Recently, the disease has been stable. Disease complications:  No hyperkalemia, no hypocalcemia, no hyperphosphatemia, no metabolic acidosis, no coagulopathy, no uremic encephalopathy, no neuropathy and no renal osteodystrophy. The patient is currently asymptomatic. No associated symptoms are reported.       Meds:   Current Outpatient Medications   Medication Sig Dispense Refill    allopurinol (Zyloprim) 300 mg tablet Take 1 tablet (300 mg) by mouth once daily.      amLODIPine (Norvasc) 10 mg tablet Take 1 tablet (10 mg) by mouth once daily.      aspirin 81 mg EC tablet Take 1 tablet (81 mg) by mouth once daily.      coenzyme Q-10 100 mg capsule Take 1 capsule (100 mg) by mouth once daily.      fluticasone (Flonase) 50 mcg/actuation nasal spray Administer 1 spray into each nostril once daily.      insulin lispro (HumaLOG KwikPen Insulin) 100 unit/mL injection Inject as directed under the skin 14 units with breakfast, 22 units with lunch and 14 units with dinner plus sliding scale up to 86 units per day. 15 mL 5    losartan (Cozaar) 50 mg tablet Take 1 tablet (50 mg) by mouth once daily. (Patient taking differently: Take 0.5 tablets (25 mg) by mouth once daily.)      metFORMIN  mg 24 hr tablet Take 3 tablets (1,500 mg) by mouth once daily in the evening. Take with meals. Do not crush, chew, or split. 270 tablet 2    mycophenolate (Cellcept) 500 mg tablet Take 2 tablets (1,000 mg) by mouth 2 times a day.      pen needle, diabetic (BD Ultra-Fine Micro Pen Needle) 32 gauge x 1/4\" needle Use as directed four times daily with insulin. 400 each 3    predniSONE (Deltasone) 2.5 mg tablet Take 1 tablet (2.5 mg) by mouth once daily. TAKE 2.5 MG IN ADDITION TO 5 MG FOR A TOTAL OF 7.5 MG DAILY.      tamsulosin (Flomax) 0.4 mg 24 hr capsule Take 2 " capsules (0.8 mg) by mouth once daily.      TURMERIC ORAL Take by mouth. OTC       No current facility-administered medications for this visit.          ROS:  The patient is awake and oriented. No dizziness or lightheadedness. No chills and no fever. No headaches. No nausea and no vomiting. No shortness of breath. No cough. No chest pain. No abdominal pain. No diarrhea. No hematemesis or hemoptysis. No hematuria. No rectal bleeding. No melena. No epistaxis. No urinary symptoms. No flank pain. No leg edema. No itching. Overall, the rest of the review of systems is also negative.  12 point review of systems otherwise negative as stated in HPI.        Physical Examination:        Vitals:    01/22/25 1159   BP: 125/74   Pulse: 108     General: The patient is awake, oriented, and is not in any distress.  Head and Neck: Normocephalic. No periorbital edema.  Eyes: non-icteric  Respiratory: Symmetric chest expansion. No respiratory distress.  Skin: No maculopapular rash.  Musculoskeletal: No peripheral edema.  Neuro Exam: Speech is fluent. Moves extremities.    Imaging:  === 01/10/25 ===    US RENAL COMPLETE    - Impression -  There are 2 nonobstructing left intrarenal calculi seen with 2  Bosniak type 2 left renal cysts. Additionally, simple bilateral renal  cysts are also seen including the largest cyst on the right measuring  up to 4.2 cm in size. The 1.4 x 1.5 x 1.4 cm Bosniak type 2 cyst on  the left is not seen on prior study. The remaining cysts have not  changed significantly.    MACRO:  None.    Signed by: Roseline Vera 1/11/2025 11:22 AM  Dictation workstation:   [id number]       Blood Labs:  No results found for this or any previous visit (from the past 24 hours).   Lab Results   Component Value Date    PTH 35.3 01/10/2025    PROTUR 100 (2+) (A) 12/08/2021      Lab Results   Component Value Date    GLUCOSE 209 (H) 01/10/2025    CALCIUM 9.3 01/10/2025     01/10/2025    K 5.4 (H) 01/10/2025    CO2 24  01/10/2025     01/10/2025    BUN 41 (H) 01/10/2025    CREATININE 1.53 (H) 01/10/2025         Assessment and Plan:  #1 chronic kidney disease stage III.  Last creatinine was 1.53.  No proteinuria.  No microscopic hematuria.  Normal PTH and vitamin D level.  Potassium level is upper limit normal.  We talked about low potassium diet.    2.  Diabetes.  No proteinuria based on spot urine protein to creatinine ratio.    3.  Hypertension.  Blood pressure is under control.  Continue the current medications.    4.  Bosniak 2 renal cyst.  I ordered a follow-up ultrasound to be done in about 6 months.    5.  Nephrolithiasis.  I referred him to urologist.         I will see him in about 6 months for follow-up          Neil Hernandez MD  Senior Attending Physician  Director of Onco-Nephrology Program  Division of Nephrology & Hypertension  [facility]

## 2025-01-25 PROBLEM — I48.11 LONGSTANDING PERSISTENT ATRIAL FIBRILLATION (MULTI): Status: ACTIVE | Noted: 2025-01-25

## 2025-01-25 PROBLEM — L97.828 NON-PRESSURE CHRONIC ULCER OF OTHER PART OF LEFT LOWER LEG WITH OTHER SPECIFIED SEVERITY (MULTI): Status: ACTIVE | Noted: 2025-01-25

## 2025-01-25 PROBLEM — E11.49 TYPE 2 DIABETES MELLITUS WITH OTHER DIABETIC NEUROLOGICAL COMPLICATION: Status: ACTIVE | Noted: 2025-01-25

## 2025-01-25 PROBLEM — I50.22 CHRONIC SYSTOLIC CHF (CONGESTIVE HEART FAILURE): Status: ACTIVE | Noted: 2025-01-25

## 2025-01-27 ENCOUNTER — OFFICE VISIT (OUTPATIENT)
Dept: WOUND CARE | Facility: CLINIC | Age: 80
End: 2025-01-27
Payer: MEDICARE

## 2025-01-27 PROCEDURE — 99214 OFFICE O/P EST MOD 30 MIN: CPT

## 2025-02-01 DIAGNOSIS — T38.0X5A STEROID-INDUCED HYPERGLYCEMIA: ICD-10-CM

## 2025-02-01 DIAGNOSIS — R73.9 STEROID-INDUCED HYPERGLYCEMIA: ICD-10-CM

## 2025-02-03 ENCOUNTER — APPOINTMENT (OUTPATIENT)
Dept: WOUND CARE | Facility: CLINIC | Age: 80
End: 2025-02-03
Payer: MEDICARE

## 2025-02-04 ENCOUNTER — PATIENT OUTREACH (OUTPATIENT)
Dept: PRIMARY CARE | Facility: CLINIC | Age: 80
End: 2025-02-04
Payer: MEDICARE

## 2025-02-04 NOTE — PROGRESS NOTES
Discharge Facility:[place]  Discharge Diagnosis:Hypoglycemia  Admission Date:2/1/2025  Discharge Date: 2/3/2025    PCP Appointment Date:2/25/2025, declined sooner  Specialist Appointment Date: TBD  Hospital Encounter and Summary Linked: Yes  See discharge assessment below for further details  Engagement  Call Start Time: 1103 (2/4/2025 11:10 AM)    Medications  Medications reviewed with patient/caregiver?: Yes (No medication change) (2/4/2025 11:10 AM)  Is the patient having any side effects they believe may be caused by any medication additions or changes?: No (2/4/2025 11:10 AM)  Does the patient have all medications ordered at discharge?: Not applicable (2/4/2025 11:10 AM)  Care Management Interventions: No intervention needed (2/4/2025 11:10 AM)  Prescription Comments: -- (N/A) (2/4/2025 11:10 AM)  Is the patient taking all medications as directed (includes completed medication regime)?: Yes (2/4/2025 11:10 AM)  Medication Comments: -- (N/A) (2/4/2025 11:10 AM)    Appointments  Does the patient have a primary care provider?: Yes (2/4/2025 11:10 AM)  Care Management Interventions: -- (Has appointment for 2/25/2025, declined sooner) (2/4/2025 11:10 AM)  Has the patient kept scheduled appointments due by today?: Yes (2/4/2025 11:10 AM)    Self Management  What is the home health agency?: -- (N/A) (2/4/2025 11:10 AM)  What Durable Medical Equipment (DME) was ordered?: -- (N/A) (2/4/2025 11:10 AM)    Patient Teaching  Does the patient have access to their discharge instructions?: Yes (2/4/2025 11:10 AM)  What is the patient's perception of their health status since discharge?: Improving (2/4/2025 11:10 AM)  Is the patient/caregiver able to teach back the hierarchy of who to call/visit for symptoms/problems? PCP, Specialist, Home Health nurse, Urgent Care, ED, 911: Yes (2/4/2025 11:10 AM)  Patient/Caregiver Education Comments: -- (Fna states he is doing well. He will monitor his BS and report any concerns. Declined to  move up PCP appointment.) (2/4/2025 11:10 AM)

## 2025-02-06 ENCOUNTER — OFFICE VISIT (OUTPATIENT)
Dept: WOUND CARE | Facility: CLINIC | Age: 80
End: 2025-02-06
Payer: MEDICARE

## 2025-02-06 PROCEDURE — 11042 DBRDMT SUBQ TIS 1ST 20SQCM/<: CPT

## 2025-02-06 PROCEDURE — 11045 DBRDMT SUBQ TISS EACH ADDL: CPT

## 2025-02-07 RX ORDER — METFORMIN HYDROCHLORIDE 500 MG/1
TABLET, EXTENDED RELEASE ORAL
Qty: 270 TABLET | Refills: 2 | Status: SHIPPED | OUTPATIENT
Start: 2025-02-07

## 2025-02-13 ENCOUNTER — OFFICE VISIT (OUTPATIENT)
Dept: WOUND CARE | Facility: CLINIC | Age: 80
End: 2025-02-13
Payer: MEDICARE

## 2025-02-13 PROCEDURE — 11042 DBRDMT SUBQ TIS 1ST 20SQCM/<: CPT

## 2025-02-13 PROCEDURE — 11045 DBRDMT SUBQ TISS EACH ADDL: CPT

## 2025-02-18 ENCOUNTER — PATIENT OUTREACH (OUTPATIENT)
Dept: PRIMARY CARE | Facility: CLINIC | Age: 80
End: 2025-02-18
Payer: MEDICARE

## 2025-02-20 ENCOUNTER — OFFICE VISIT (OUTPATIENT)
Dept: WOUND CARE | Facility: CLINIC | Age: 80
End: 2025-02-20
Payer: MEDICARE

## 2025-02-20 PROCEDURE — 11045 DBRDMT SUBQ TISS EACH ADDL: CPT

## 2025-02-20 PROCEDURE — 11042 DBRDMT SUBQ TIS 1ST 20SQCM/<: CPT

## 2025-02-22 LAB
25(OH)D3+25(OH)D2 SERPL-MCNC: 56 NG/ML (ref 30–100)
ANION GAP SERPL CALCULATED.4IONS-SCNC: 10 MMOL/L (CALC) (ref 7–17)
BUN SERPL-MCNC: 39 MG/DL (ref 7–25)
BUN/CREAT SERPL: 28 (CALC) (ref 6–22)
CALCIUM SERPL-MCNC: 9.2 MG/DL (ref 8.6–10.3)
CHLORIDE SERPL-SCNC: 105 MMOL/L (ref 98–110)
CO2 SERPL-SCNC: 24 MMOL/L (ref 20–32)
CREAT SERPL-MCNC: 1.37 MG/DL (ref 0.7–1.28)
EGFRCR SERPLBLD CKD-EPI 2021: 52 ML/MIN/1.73M2
GLUCOSE SERPL-MCNC: 115 MG/DL (ref 65–99)
POTASSIUM SERPL-SCNC: 3.9 MMOL/L (ref 3.5–5.3)
PTH-INTACT SERPL-MCNC: 29 PG/ML (ref 16–77)
SODIUM SERPL-SCNC: 139 MMOL/L (ref 135–146)

## 2025-02-25 ENCOUNTER — TELEPHONE (OUTPATIENT)
Dept: NEPHROLOGY | Facility: CLINIC | Age: 80
End: 2025-02-25

## 2025-02-25 ENCOUNTER — APPOINTMENT (OUTPATIENT)
Dept: PRIMARY CARE | Facility: CLINIC | Age: 80
End: 2025-02-25
Payer: MEDICARE

## 2025-02-25 VITALS
DIASTOLIC BLOOD PRESSURE: 60 MMHG | HEART RATE: 84 BPM | BODY MASS INDEX: 22.96 KG/M2 | OXYGEN SATURATION: 98 % | HEIGHT: 70 IN | SYSTOLIC BLOOD PRESSURE: 110 MMHG | TEMPERATURE: 96.8 F | RESPIRATION RATE: 14 BRPM

## 2025-02-25 DIAGNOSIS — D64.9 ANEMIA, UNSPECIFIED TYPE: ICD-10-CM

## 2025-02-25 DIAGNOSIS — N18.31 STAGE 3A CHRONIC KIDNEY DISEASE (MULTI): ICD-10-CM

## 2025-02-25 DIAGNOSIS — C61 PROSTATE CANCER (MULTI): ICD-10-CM

## 2025-02-25 DIAGNOSIS — G70.00 MYASTHENIA GRAVIS: ICD-10-CM

## 2025-02-25 DIAGNOSIS — K76.0 HEPATIC STEATOSIS: ICD-10-CM

## 2025-02-25 DIAGNOSIS — I25.10 CORONARY ARTERY DISEASE INVOLVING NATIVE CORONARY ARTERY OF NATIVE HEART WITHOUT ANGINA PECTORIS: ICD-10-CM

## 2025-02-25 DIAGNOSIS — G95.9 MYELOPATHY (MULTI): ICD-10-CM

## 2025-02-25 DIAGNOSIS — Z00.00 ROUTINE HEALTH MAINTENANCE: Primary | ICD-10-CM

## 2025-02-25 DIAGNOSIS — I10 PRIMARY HYPERTENSION: ICD-10-CM

## 2025-02-25 DIAGNOSIS — E11.49 TYPE 2 DIABETES MELLITUS WITH OTHER DIABETIC NEUROLOGICAL COMPLICATION: ICD-10-CM

## 2025-02-25 LAB
POC FINGERSTICK BLOOD GLUCOSE: 177 MG/DL (ref 70–100)
POC HEMOGLOBIN A1C: 5.8 % (ref 4.2–6.5)

## 2025-02-25 PROCEDURE — 1157F ADVNC CARE PLAN IN RCRD: CPT | Performed by: STUDENT IN AN ORGANIZED HEALTH CARE EDUCATION/TRAINING PROGRAM

## 2025-02-25 PROCEDURE — 99214 OFFICE O/P EST MOD 30 MIN: CPT | Performed by: STUDENT IN AN ORGANIZED HEALTH CARE EDUCATION/TRAINING PROGRAM

## 2025-02-25 PROCEDURE — 1160F RVW MEDS BY RX/DR IN RCRD: CPT | Performed by: STUDENT IN AN ORGANIZED HEALTH CARE EDUCATION/TRAINING PROGRAM

## 2025-02-25 PROCEDURE — 1159F MED LIST DOCD IN RCRD: CPT | Performed by: STUDENT IN AN ORGANIZED HEALTH CARE EDUCATION/TRAINING PROGRAM

## 2025-02-25 PROCEDURE — 82962 GLUCOSE BLOOD TEST: CPT | Performed by: STUDENT IN AN ORGANIZED HEALTH CARE EDUCATION/TRAINING PROGRAM

## 2025-02-25 PROCEDURE — G2211 COMPLEX E/M VISIT ADD ON: HCPCS | Performed by: STUDENT IN AN ORGANIZED HEALTH CARE EDUCATION/TRAINING PROGRAM

## 2025-02-25 PROCEDURE — 3078F DIAST BP <80 MM HG: CPT | Performed by: STUDENT IN AN ORGANIZED HEALTH CARE EDUCATION/TRAINING PROGRAM

## 2025-02-25 PROCEDURE — 83036 HEMOGLOBIN GLYCOSYLATED A1C: CPT | Performed by: STUDENT IN AN ORGANIZED HEALTH CARE EDUCATION/TRAINING PROGRAM

## 2025-02-25 PROCEDURE — 1036F TOBACCO NON-USER: CPT | Performed by: STUDENT IN AN ORGANIZED HEALTH CARE EDUCATION/TRAINING PROGRAM

## 2025-02-25 PROCEDURE — 3074F SYST BP LT 130 MM HG: CPT | Performed by: STUDENT IN AN ORGANIZED HEALTH CARE EDUCATION/TRAINING PROGRAM

## 2025-02-25 ASSESSMENT — ENCOUNTER SYMPTOMS
NAUSEA: 0
CHILLS: 0
VOMITING: 0
DIAPHORESIS: 0
FEVER: 0
DIARRHEA: 0
ROS GI COMMENTS: NO MELENA, HEMATOCHEZIA, HEMATEMESIS, COFFEE GROUND EMESIS.
LIGHT-HEADEDNESS: 0
DIZZINESS: 0
SHORTNESS OF BREATH: 0

## 2025-02-25 NOTE — TELEPHONE ENCOUNTER
----- Message from Neil Hernandez sent at 2/24/2025 10:32 AM EST -----  No major change in kidney function.

## 2025-02-27 ENCOUNTER — OFFICE VISIT (OUTPATIENT)
Dept: WOUND CARE | Facility: CLINIC | Age: 80
End: 2025-02-27
Payer: MEDICARE

## 2025-02-27 PROCEDURE — 11042 DBRDMT SUBQ TIS 1ST 20SQCM/<: CPT

## 2025-02-27 PROCEDURE — 11045 DBRDMT SUBQ TISS EACH ADDL: CPT

## 2025-03-06 ENCOUNTER — OFFICE VISIT (OUTPATIENT)
Dept: WOUND CARE | Facility: CLINIC | Age: 80
End: 2025-03-06
Payer: MEDICARE

## 2025-03-06 PROCEDURE — 11042 DBRDMT SUBQ TIS 1ST 20SQCM/<: CPT

## 2025-03-06 PROCEDURE — 11045 DBRDMT SUBQ TISS EACH ADDL: CPT

## 2025-03-13 ENCOUNTER — OFFICE VISIT (OUTPATIENT)
Dept: WOUND CARE | Facility: CLINIC | Age: 80
End: 2025-03-13
Payer: MEDICARE

## 2025-03-13 PROCEDURE — 11042 DBRDMT SUBQ TIS 1ST 20SQCM/<: CPT

## 2025-03-13 PROCEDURE — 11045 DBRDMT SUBQ TISS EACH ADDL: CPT

## 2025-03-18 ENCOUNTER — PATIENT OUTREACH (OUTPATIENT)
Dept: PRIMARY CARE | Facility: CLINIC | Age: 80
End: 2025-03-18
Payer: MEDICARE

## 2025-03-20 ENCOUNTER — OFFICE VISIT (OUTPATIENT)
Dept: WOUND CARE | Facility: CLINIC | Age: 80
End: 2025-03-20
Payer: MEDICARE

## 2025-03-20 PROCEDURE — 11042 DBRDMT SUBQ TIS 1ST 20SQCM/<: CPT

## 2025-03-20 PROCEDURE — 11045 DBRDMT SUBQ TISS EACH ADDL: CPT

## 2025-03-27 ENCOUNTER — OFFICE VISIT (OUTPATIENT)
Dept: WOUND CARE | Facility: CLINIC | Age: 80
End: 2025-03-27
Payer: MEDICARE

## 2025-03-27 PROCEDURE — 11042 DBRDMT SUBQ TIS 1ST 20SQCM/<: CPT

## 2025-03-27 PROCEDURE — 11045 DBRDMT SUBQ TISS EACH ADDL: CPT

## 2025-04-03 ENCOUNTER — OFFICE VISIT (OUTPATIENT)
Dept: WOUND CARE | Facility: CLINIC | Age: 80
End: 2025-04-03
Payer: MEDICARE

## 2025-04-03 PROCEDURE — 97597 DBRDMT OPN WND 1ST 20 CM/<: CPT

## 2025-04-03 PROCEDURE — 11042 DBRDMT SUBQ TIS 1ST 20SQCM/<: CPT

## 2025-04-03 PROCEDURE — 11045 DBRDMT SUBQ TISS EACH ADDL: CPT

## 2025-04-07 ENCOUNTER — TELEPHONE (OUTPATIENT)
Dept: PRIMARY CARE | Facility: CLINIC | Age: 80
End: 2025-04-07
Payer: MEDICARE

## 2025-04-07 NOTE — TELEPHONE ENCOUNTER
Pt called and stated both legs are swollen I scheduled for Wed - not sure if that is ok or should be seen sooner - please advise

## 2025-04-08 ENCOUNTER — PATIENT MESSAGE (OUTPATIENT)
Dept: PRIMARY CARE | Facility: CLINIC | Age: 80
End: 2025-04-08

## 2025-04-08 ENCOUNTER — OFFICE VISIT (OUTPATIENT)
Dept: PRIMARY CARE | Facility: CLINIC | Age: 80
End: 2025-04-08
Payer: MEDICARE

## 2025-04-08 DIAGNOSIS — I49.9 IRREGULAR CARDIAC RHYTHM: ICD-10-CM

## 2025-04-08 DIAGNOSIS — M79.604 BILATERAL LEG PAIN: Primary | ICD-10-CM

## 2025-04-08 DIAGNOSIS — M79.605 BILATERAL LEG PAIN: Primary | ICD-10-CM

## 2025-04-08 DIAGNOSIS — M79.89 LEG SWELLING: ICD-10-CM

## 2025-04-08 DIAGNOSIS — C61 PROSTATE CANCER (MULTI): ICD-10-CM

## 2025-04-08 DIAGNOSIS — I50.22 CHRONIC SYSTOLIC CHF (CONGESTIVE HEART FAILURE): ICD-10-CM

## 2025-04-08 DIAGNOSIS — R00.0 TACHYCARDIA: ICD-10-CM

## 2025-04-08 DIAGNOSIS — I25.10 CORONARY ARTERY DISEASE INVOLVING NATIVE CORONARY ARTERY OF NATIVE HEART WITHOUT ANGINA PECTORIS: ICD-10-CM

## 2025-04-08 DIAGNOSIS — R06.01 ORTHOPNEA: ICD-10-CM

## 2025-04-08 PROCEDURE — 93000 ELECTROCARDIOGRAM COMPLETE: CPT | Performed by: STUDENT IN AN ORGANIZED HEALTH CARE EDUCATION/TRAINING PROGRAM

## 2025-04-08 ASSESSMENT — ENCOUNTER SYMPTOMS
COUGH: 0
VOMITING: 0
DIAPHORESIS: 0
FEVER: 0
DIZZINESS: 0
NAUSEA: 0
SHORTNESS OF BREATH: 0
DIARRHEA: 0
CHILLS: 0
ABDOMINAL PAIN: 0
LIGHT-HEADEDNESS: 0

## 2025-04-08 NOTE — PROGRESS NOTES
"Subjective   Patient ID: Guero Cope \"Colby" is a 79 y.o. male who presents for Leg Swelling (Bilateral leg swelling. Admits to leg tingling as well. ).  History of Present Illness  Guero Cope \"Colby" is a 79 year old male with prostate cancer undergoing radiation therapy who presents with bilateral leg swelling and tingling.    He has been experiencing bilateral leg swelling and tingling for approximately one week. The swelling was more pronounced initially, and he describes a sensation of tingling in the legs. He denies any recent medication changes but mentions an increase in Gatorade consumption. No consumption of salty foods, fever, chills, sweats, shortness of breath, chest pain, cough, nausea, vomiting, or rashes. He reports diarrhea, which has improved with Pepto-Bismol. He uses compression socks to manage the swelling.    He has a history of two stents and reports a change in his breathing rhythm, particularly at night when lying flat, although he does not feel it is labored. No lightheadedness, dizziness, or syncope.    He is undergoing radiation therapy for prostate cancer, with his last session being yesterday. He is managing diarrhea, a side effect of the therapy, with Pepto-Bismol.    He had a complete scan two months ago which showed no issues. He states he is scheduled to see a nurse practitioner with cardiology on Thursday.    Review of Systems   Constitutional:  Negative for chills, diaphoresis and fever.   Respiratory:  Negative for cough and shortness of breath.    Cardiovascular:  Negative for chest pain.   Gastrointestinal:  Negative for abdominal pain, diarrhea, nausea and vomiting.   Skin:  Negative for rash.   Neurological:  Negative for dizziness, syncope and light-headedness.       Objective     /72   Pulse 82   Temp 36.3 °C (97.4 °F)   Resp 16   SpO2 99%      Physical Exam  GENERAL: Alert, cooperative, well developed, no acute distress, no diaphoresis.  CHEST: Clear to " auscultation bilaterally, no wheezes, rhonchi, or rales.  CARDIOVASCULAR: Abnormal heart rhythm, S1 and S2 normal without murmurs, rubs, or gallops.  EXTREMITIES: 2+ pitting edema in both lower extremities, thin skin and mild erythema anteriorly in both lower extremities.  NEUROLOGICAL: Moves all extremities without gross motor deficit.    Assessment & Plan  Bilateral lower extremity edema  Hx CAD  Hx CHF  Acute bilateral lower extremity edema with pitting, thin skin, and mild erythema. Possible etiologies include heart failure, venous insufficiency, or deep vein thrombosis (DVT). He is undergoing radiation therapy for prostate cancer, which may contribute to the edema and increase risk of clots. Reports no significant changes in diet or fluid intake, although he has increased Gatorade consumption. No fever, chills, or signs of infection. Recent scan two months ago showed no clotting issues. Significant swelling raises concern for cardiac overload, warranting further investigation.  - Strongly recommended ER evaluation for the swelling and tachycardia, for which refuses evaluation in ER and refused EMS evaluation.   - Order blood work to assess kidney function, electrolytes, and blood counts  - Order chest x-ray to evaluate for heart failure or other cardiac issues  - Order stat ultrasound of the legs to rule out DVT  - CBC, BMP, BNP ordered  - Perform EKG to assess heart rhythm  - Strongly advised he go to ER should he change his mind.     Tachycardia  - EKG in office shows tachycardia with premature SVC and PVC and average .   - He refused ER evaluation  - Strongly advised he go to ER should he change his mind.   - He is attending cardiology appointment on Thursday    Prostate cancer  Undergoing radiation therapy for prostate cancer. Reports no urinary issues but experiences diarrhea, managed with Pepto-Bismol. Radiation therapy may contribute to edema and other symptoms.  - Management per  urology      Results         Madi Sloan DO     This medical note was created with the assistance of artificial intelligence (AI) for documentation purposes. The content has been reviewed and confirmed by the healthcare provider for accuracy and completeness. Patient consented to the use of audio recording and use of AI during their visit.

## 2025-04-09 ENCOUNTER — APPOINTMENT (OUTPATIENT)
Dept: PRIMARY CARE | Facility: CLINIC | Age: 80
End: 2025-04-09
Payer: MEDICARE

## 2025-04-09 ENCOUNTER — HOSPITAL ENCOUNTER (OUTPATIENT)
Dept: RADIOLOGY | Facility: CLINIC | Age: 80
Discharge: HOME | End: 2025-04-09
Payer: MEDICARE

## 2025-04-09 DIAGNOSIS — R06.01 ORTHOPNEA: ICD-10-CM

## 2025-04-09 PROCEDURE — 71046 X-RAY EXAM CHEST 2 VIEWS: CPT

## 2025-04-09 PROCEDURE — 71046 X-RAY EXAM CHEST 2 VIEWS: CPT | Performed by: RADIOLOGY

## 2025-04-10 ENCOUNTER — HOSPITAL ENCOUNTER (OUTPATIENT)
Dept: CARDIOLOGY | Facility: HOSPITAL | Age: 80
Discharge: HOME | End: 2025-04-10
Payer: MEDICARE

## 2025-04-10 ENCOUNTER — OFFICE VISIT (OUTPATIENT)
Dept: WOUND CARE | Facility: CLINIC | Age: 80
End: 2025-04-10
Payer: MEDICARE

## 2025-04-10 ENCOUNTER — TELEPHONE (OUTPATIENT)
Dept: PRIMARY CARE | Facility: CLINIC | Age: 80
End: 2025-04-10
Payer: MEDICARE

## 2025-04-10 DIAGNOSIS — M79.605 BILATERAL LEG PAIN: ICD-10-CM

## 2025-04-10 DIAGNOSIS — M79.89 LEG SWELLING: ICD-10-CM

## 2025-04-10 DIAGNOSIS — M79.604 BILATERAL LEG PAIN: ICD-10-CM

## 2025-04-10 LAB
ANION GAP SERPL CALCULATED.4IONS-SCNC: 13 MMOL/L (CALC) (ref 7–17)
BASOPHILS # BLD AUTO: 11 CELLS/UL (ref 0–200)
BASOPHILS NFR BLD AUTO: 0.2 %
BNP SERPL-MCNC: 502 PG/ML
BUN SERPL-MCNC: 32 MG/DL (ref 7–25)
BUN/CREAT SERPL: 24 (CALC) (ref 6–22)
CALCIUM SERPL-MCNC: 7.9 MG/DL (ref 8.6–10.3)
CHLORIDE SERPL-SCNC: 110 MMOL/L (ref 98–110)
CO2 SERPL-SCNC: 19 MMOL/L (ref 20–32)
CREAT SERPL-MCNC: 1.32 MG/DL (ref 0.7–1.28)
EGFRCR SERPLBLD CKD-EPI 2021: 55 ML/MIN/1.73M2
EOSINOPHIL # BLD AUTO: 11 CELLS/UL (ref 15–500)
EOSINOPHIL NFR BLD AUTO: 0.2 %
ERYTHROCYTE [DISTWIDTH] IN BLOOD BY AUTOMATED COUNT: 15.4 % (ref 11–15)
GLUCOSE SERPL-MCNC: 151 MG/DL (ref 65–139)
HCT VFR BLD AUTO: 29.1 % (ref 38.5–50)
HGB BLD-MCNC: 9.2 G/DL (ref 13.2–17.1)
LYMPHOCYTES # BLD AUTO: 353 CELLS/UL (ref 850–3900)
LYMPHOCYTES NFR BLD AUTO: 6.3 %
MCH RBC QN AUTO: 27 PG (ref 27–33)
MCHC RBC AUTO-ENTMCNC: 31.6 G/DL (ref 32–36)
MCV RBC AUTO: 85.3 FL (ref 80–100)
MONOCYTES # BLD AUTO: 263 CELLS/UL (ref 200–950)
MONOCYTES NFR BLD AUTO: 4.7 %
NEUTROPHILS # BLD AUTO: 4962 CELLS/UL (ref 1500–7800)
NEUTROPHILS NFR BLD AUTO: 88.6 %
PLATELET # BLD AUTO: 165 THOUSAND/UL (ref 140–400)
PMV BLD REES-ECKER: 10.5 FL (ref 7.5–12.5)
POTASSIUM SERPL-SCNC: 3.8 MMOL/L (ref 3.5–5.3)
RBC # BLD AUTO: 3.41 MILLION/UL (ref 4.2–5.8)
SODIUM SERPL-SCNC: 142 MMOL/L (ref 135–146)
WBC # BLD AUTO: 5.6 THOUSAND/UL (ref 3.8–10.8)

## 2025-04-10 PROCEDURE — 11045 DBRDMT SUBQ TISS EACH ADDL: CPT

## 2025-04-10 PROCEDURE — 11042 DBRDMT SUBQ TIS 1ST 20SQCM/<: CPT

## 2025-04-10 PROCEDURE — 93970 EXTREMITY STUDY: CPT

## 2025-04-10 PROCEDURE — 93970 EXTREMITY STUDY: CPT | Performed by: SURGERY

## 2025-04-10 NOTE — TELEPHONE ENCOUNTER
Received message from Soumya Blunt regarding preliminary positive venous duplex in the right leg in the popliteal, posterior tibial and peroneal veins.  Because of this and the fact that his heart rate was elevated in our office yesterday I advised to Soumya that I again feel patient needs to be evaluated in ER.

## 2025-04-11 ENCOUNTER — HOSPITAL ENCOUNTER (OUTPATIENT)
Dept: RADIOLOGY | Facility: CLINIC | Age: 80
Discharge: HOME | End: 2025-04-11
Payer: MEDICARE

## 2025-04-11 ENCOUNTER — TELEPHONE (OUTPATIENT)
Dept: PRIMARY CARE | Facility: CLINIC | Age: 80
End: 2025-04-11
Payer: MEDICARE

## 2025-04-11 DIAGNOSIS — I82.4Y9 DEEP VEIN THROMBOSIS (DVT) OF PROXIMAL LOWER EXTREMITY, UNSPECIFIED CHRONICITY, UNSPECIFIED LATERALITY: ICD-10-CM

## 2025-04-11 DIAGNOSIS — R00.0 TACHYCARDIA: ICD-10-CM

## 2025-04-11 DIAGNOSIS — R00.0 TACHYCARDIA: Primary | ICD-10-CM

## 2025-04-11 PROCEDURE — 2550000001 HC RX 255 CONTRASTS: Performed by: STUDENT IN AN ORGANIZED HEALTH CARE EDUCATION/TRAINING PROGRAM

## 2025-04-11 PROCEDURE — 71275 CT ANGIOGRAPHY CHEST: CPT

## 2025-04-11 RX ADMIN — IOHEXOL 70 ML: 350 INJECTION, SOLUTION INTRAVENOUS at 10:24

## 2025-04-11 NOTE — TELEPHONE ENCOUNTER
Called and lmom to have pt call back. I need to get him set up for a stat CT and need to know his availability for today.

## 2025-04-11 NOTE — TELEPHONE ENCOUNTER
Called pt to inform him that I spoke with schedule and the [facility] radiology advises pt to just walk in for his stat CT angio and they will fit him in. Pt is aware and states he will be there in about 20 minutes. Scheduling stated no appt needed just to walk in.

## 2025-04-12 VITALS
TEMPERATURE: 97.4 F | DIASTOLIC BLOOD PRESSURE: 72 MMHG | SYSTOLIC BLOOD PRESSURE: 118 MMHG | RESPIRATION RATE: 16 BRPM | OXYGEN SATURATION: 99 % | HEART RATE: 82 BPM

## 2025-04-12 PROBLEM — M79.605 BILATERAL LEG PAIN: Status: ACTIVE | Noted: 2025-04-12

## 2025-04-12 PROBLEM — M79.89 LEG SWELLING: Status: ACTIVE | Noted: 2025-04-12

## 2025-04-12 PROBLEM — R06.01 ORTHOPNEA: Status: ACTIVE | Noted: 2025-04-12

## 2025-04-12 PROBLEM — I49.9 IRREGULAR CARDIAC RHYTHM: Status: ACTIVE | Noted: 2025-04-12

## 2025-04-12 PROBLEM — M79.604 BILATERAL LEG PAIN: Status: ACTIVE | Noted: 2025-04-12

## 2025-04-17 ENCOUNTER — OFFICE VISIT (OUTPATIENT)
Dept: WOUND CARE | Facility: CLINIC | Age: 80
End: 2025-04-17
Payer: MEDICARE

## 2025-04-17 PROCEDURE — 11045 DBRDMT SUBQ TISS EACH ADDL: CPT

## 2025-04-17 PROCEDURE — 11042 DBRDMT SUBQ TIS 1ST 20SQCM/<: CPT

## 2025-04-18 ENCOUNTER — PATIENT OUTREACH (OUTPATIENT)
Dept: PRIMARY CARE | Facility: CLINIC | Age: 80
End: 2025-04-18
Payer: MEDICARE

## 2025-04-18 RX ORDER — CARVEDILOL 3.12 MG/1
3.12 TABLET ORAL
COMMUNITY
Start: 2025-04-16 | End: 2025-05-16

## 2025-04-18 RX ORDER — APIXABAN 5 MG (74)
KIT ORAL
COMMUNITY
Start: 2025-04-10 | End: 2025-05-10

## 2025-04-18 RX ORDER — BLOOD-GLUCOSE SENSOR
EACH MISCELLANEOUS
COMMUNITY
Start: 2025-03-31

## 2025-04-18 RX ORDER — PANTOPRAZOLE SODIUM 40 MG/1
40 TABLET, DELAYED RELEASE ORAL
COMMUNITY
Start: 2025-04-17 | End: 2025-05-17

## 2025-04-18 RX ORDER — LANOLIN ALCOHOL/MO/W.PET/CERES
400 CREAM (GRAM) TOPICAL 2 TIMES DAILY
COMMUNITY
Start: 2025-04-16 | End: 2025-05-16

## 2025-04-18 NOTE — PROGRESS NOTES
Discharge Facility:  [facility]   Discharge Diagnosis:  Acute diastolic CHF (congestive heart failure)   Admission Date:  4/14/25   Discharge Date:  4/16/25     PCP Appointment Date: declined need for appt     Specialist Appointment Date:   Hospital Encounter and Summary Linked: Yes    Hospital Encounter      carvedilol 3.125 mg tablet  furosemide 20 mg tablet  magnesium oxide 400 mg (241.3 mg magnesium) tablet  pantoprazole DR 40 mg tablet    Wrap Up  Wrap Up Additional Comments: Successful transition of care outreach with patient. Pt reports doing well at home since discharge. New meds reviewed. Pt denies CP and SOB. pt states per endo no needs for DM meds at this time. pt denies need to see pcp at this time.  Patient denies any further discharge questions/needs at this time. Pt aware of my availability for non-emergent concerns. Contact info provided to patient (4/18/2025 11:59 AM)    Medications  Medications reviewed with patient/caregiver?: Yes (4/18/2025 11:59 AM)  Is the patient having any side effects they believe may be caused by any medication additions or changes?: No (4/18/2025 11:59 AM)  Does the patient have all medications ordered at discharge?: Yes (4/18/2025 11:59 AM)  Prescription Comments: New scripts given at discharge :carvedilol 3.125 mg tablet  furosemide 20 mg tablet  magnesium oxide 400 mg (241.3 mg magnesium) tablet  pantoprazole DR 40 mg tablet (4/18/2025 11:59 AM)  Is the patient taking all medications as directed (includes completed medication regime)?: Yes (4/18/2025 11:59 AM)  Care Management Interventions: Provided patient education (4/18/2025 11:59 AM)  Medication Comments: Pt denies problems obtaining or affording medication. No medication-related issues identified (4/18/2025 11:59 AM)    Appointments  Does the patient have a primary care provider?: Yes (Pt declined need to see pcp at this time.) (4/18/2025 11:59 AM)  Has the patient kept scheduled appointments due by today?: Yes  (4/18/2025 11:59 AM)  Care Management Interventions: Advised patient to keep appointment (4/18/2025 11:59 AM)    Self Management  What is the home health agency?: DENIES NEED (4/18/2025 11:59 AM)  What Durable Medical Equipment (DME) was ordered?: N/A (4/18/2025 11:59 AM)    Patient Teaching  Does the patient have access to their discharge instructions?: Yes (4/18/2025 11:59 AM)  Care Management Interventions: Reviewed instructions with patient (4/18/2025 11:59 AM)  What is the patient's perception of their health status since discharge?: Improving (4/18/2025 11:59 AM)  Is the patient/caregiver able to teach back the hierarchy of who to call/visit for symptoms/problems? PCP, Specialist, Home Health nurse, Urgent Care, ED, 911: Yes (4/18/2025 11:59 AM)

## 2025-04-21 ENCOUNTER — TELEPHONE (OUTPATIENT)
Dept: GASTROENTEROLOGY | Facility: CLINIC | Age: 80
End: 2025-04-21
Payer: MEDICARE

## 2025-04-21 NOTE — TELEPHONE ENCOUNTER
Called patient to see if he wanted to set up a consult for his five year repeat colonoscopy.  Patient stated that at this point, he is 80 years old and does not wish to have a colonoscopy.  He would like his name removed from the call list.

## 2025-04-24 ENCOUNTER — OFFICE VISIT (OUTPATIENT)
Dept: WOUND CARE | Facility: CLINIC | Age: 80
End: 2025-04-24
Payer: MEDICARE

## 2025-04-24 PROCEDURE — 11042 DBRDMT SUBQ TIS 1ST 20SQCM/<: CPT

## 2025-05-01 ENCOUNTER — OFFICE VISIT (OUTPATIENT)
Dept: WOUND CARE | Facility: CLINIC | Age: 80
End: 2025-05-01
Payer: MEDICARE

## 2025-05-01 PROCEDURE — 11045 DBRDMT SUBQ TISS EACH ADDL: CPT

## 2025-05-01 PROCEDURE — 11042 DBRDMT SUBQ TIS 1ST 20SQCM/<: CPT

## 2025-05-08 ENCOUNTER — OFFICE VISIT (OUTPATIENT)
Dept: WOUND CARE | Facility: CLINIC | Age: 80
End: 2025-05-08
Payer: MEDICARE

## 2025-05-08 PROCEDURE — 97597 DBRDMT OPN WND 1ST 20 CM/<: CPT

## 2025-05-15 ENCOUNTER — OFFICE VISIT (OUTPATIENT)
Dept: WOUND CARE | Facility: CLINIC | Age: 80
End: 2025-05-15
Payer: MEDICARE

## 2025-05-15 PROCEDURE — 11042 DBRDMT SUBQ TIS 1ST 20SQCM/<: CPT

## 2025-05-21 DIAGNOSIS — D64.9 ANEMIA, UNSPECIFIED TYPE: ICD-10-CM

## 2025-05-21 DIAGNOSIS — Z00.00 HEALTHCARE MAINTENANCE: Primary | ICD-10-CM

## 2025-05-21 DIAGNOSIS — I10 PRIMARY HYPERTENSION: ICD-10-CM

## 2025-05-21 DIAGNOSIS — N18.31 STAGE 3A CHRONIC KIDNEY DISEASE (MULTI): ICD-10-CM

## 2025-05-22 ENCOUNTER — OFFICE VISIT (OUTPATIENT)
Dept: WOUND CARE | Facility: CLINIC | Age: 80
End: 2025-05-22
Payer: MEDICARE

## 2025-05-22 PROCEDURE — 99213 OFFICE O/P EST LOW 20 MIN: CPT

## 2025-05-24 LAB
ALBUMIN SERPL-MCNC: 4 G/DL (ref 3.6–5.1)
ALP SERPL-CCNC: 60 U/L (ref 35–144)
ALT SERPL-CCNC: 12 U/L (ref 9–46)
ANION GAP SERPL CALCULATED.4IONS-SCNC: 11 MMOL/L (CALC) (ref 7–17)
AST SERPL-CCNC: 15 U/L (ref 10–35)
BASOPHILS # BLD AUTO: 11 CELLS/UL (ref 0–200)
BASOPHILS NFR BLD AUTO: 0.2 %
BILIRUB SERPL-MCNC: 0.4 MG/DL (ref 0.2–1.2)
BUN SERPL-MCNC: 44 MG/DL (ref 7–25)
CALCIUM SERPL-MCNC: 9 MG/DL (ref 8.6–10.3)
CHLORIDE SERPL-SCNC: 104 MMOL/L (ref 98–110)
CO2 SERPL-SCNC: 25 MMOL/L (ref 20–32)
CREAT SERPL-MCNC: 1.82 MG/DL (ref 0.7–1.22)
EGFRCR SERPLBLD CKD-EPI 2021: 37 ML/MIN/1.73M2
EOSINOPHIL # BLD AUTO: 22 CELLS/UL (ref 15–500)
EOSINOPHIL NFR BLD AUTO: 0.4 %
ERYTHROCYTE [DISTWIDTH] IN BLOOD BY AUTOMATED COUNT: 15.8 % (ref 11–15)
GLUCOSE SERPL-MCNC: 146 MG/DL (ref 65–99)
HCT VFR BLD AUTO: 34.3 % (ref 38.5–50)
HGB BLD-MCNC: 10.1 G/DL (ref 13.2–17.1)
LYMPHOCYTES # BLD AUTO: 836 CELLS/UL (ref 850–3900)
LYMPHOCYTES NFR BLD AUTO: 15.2 %
MCH RBC QN AUTO: 25.6 PG (ref 27–33)
MCHC RBC AUTO-ENTMCNC: 29.4 G/DL (ref 32–36)
MCV RBC AUTO: 86.8 FL (ref 80–100)
MONOCYTES # BLD AUTO: 391 CELLS/UL (ref 200–950)
MONOCYTES NFR BLD AUTO: 7.1 %
NEUTROPHILS # BLD AUTO: 4241 CELLS/UL (ref 1500–7800)
NEUTROPHILS NFR BLD AUTO: 77.1 %
PLATELET # BLD AUTO: 186 THOUSAND/UL (ref 140–400)
PMV BLD REES-ECKER: 10.8 FL (ref 7.5–12.5)
POTASSIUM SERPL-SCNC: 4 MMOL/L (ref 3.5–5.3)
PROT SERPL-MCNC: 5.8 G/DL (ref 6.1–8.1)
RBC # BLD AUTO: 3.95 MILLION/UL (ref 4.2–5.8)
SODIUM SERPL-SCNC: 140 MMOL/L (ref 135–146)
WBC # BLD AUTO: 5.5 THOUSAND/UL (ref 3.8–10.8)

## 2025-05-27 ENCOUNTER — APPOINTMENT (OUTPATIENT)
Dept: PRIMARY CARE | Facility: CLINIC | Age: 80
End: 2025-05-27
Payer: MEDICARE

## 2025-05-27 VITALS
DIASTOLIC BLOOD PRESSURE: 64 MMHG | OXYGEN SATURATION: 96 % | BODY MASS INDEX: 21.47 KG/M2 | SYSTOLIC BLOOD PRESSURE: 115 MMHG | HEIGHT: 70 IN | HEART RATE: 63 BPM | WEIGHT: 150 LBS | RESPIRATION RATE: 14 BRPM | TEMPERATURE: 98.4 F

## 2025-05-27 DIAGNOSIS — G70.00 MYASTHENIA GRAVIS WITHOUT (ACUTE) EXACERBATION: ICD-10-CM

## 2025-05-27 DIAGNOSIS — R00.0 TACHYCARDIA: ICD-10-CM

## 2025-05-27 DIAGNOSIS — E11.69 CONTROLLED TYPE 2 DIABETES MELLITUS WITH OTHER SPECIFIED COMPLICATION, UNSPECIFIED WHETHER LONG TERM INSULIN USE (MULTI): ICD-10-CM

## 2025-05-27 DIAGNOSIS — E11.49 TYPE 2 DIABETES MELLITUS WITH OTHER DIABETIC NEUROLOGICAL COMPLICATION: ICD-10-CM

## 2025-05-27 DIAGNOSIS — N18.31 STAGE 3A CHRONIC KIDNEY DISEASE (MULTI): Primary | ICD-10-CM

## 2025-05-27 DIAGNOSIS — I48.11 LONGSTANDING PERSISTENT ATRIAL FIBRILLATION (MULTI): ICD-10-CM

## 2025-05-27 DIAGNOSIS — E11.22 TYPE 2 DIABETES MELLITUS WITH DIABETIC CHRONIC KIDNEY DISEASE, UNSPECIFIED CKD STAGE, UNSPECIFIED WHETHER LONG TERM INSULIN USE: ICD-10-CM

## 2025-05-27 DIAGNOSIS — K76.0 HEPATIC STEATOSIS: ICD-10-CM

## 2025-05-27 DIAGNOSIS — N28.1 KIDNEY CYSTS: ICD-10-CM

## 2025-05-27 DIAGNOSIS — I48.91 ATRIAL FIBRILLATION, UNSPECIFIED TYPE (MULTI): ICD-10-CM

## 2025-05-27 DIAGNOSIS — M48.04 MYELOPATHY CONCURRENT WITH AND DUE TO SPINAL STENOSIS OF THORACIC REGION (MULTI): ICD-10-CM

## 2025-05-27 DIAGNOSIS — I13.0 HYPERTENSIVE HEART AND CHRONIC KIDNEY DISEASE WITH HEART FAILURE AND STAGE 1 THROUGH STAGE 4 CHRONIC KIDNEY DISEASE, OR UNSPECIFIED CHRONIC KIDNEY DISEASE: ICD-10-CM

## 2025-05-27 DIAGNOSIS — G95.9 MYELOPATHY (MULTI): ICD-10-CM

## 2025-05-27 DIAGNOSIS — I50.22 CHRONIC SYSTOLIC CHF (CONGESTIVE HEART FAILURE): ICD-10-CM

## 2025-05-27 DIAGNOSIS — I82.4Y1 ACUTE DEEP VEIN THROMBOSIS (DVT) OF PROXIMAL VEIN OF RIGHT LOWER EXTREMITY: ICD-10-CM

## 2025-05-27 DIAGNOSIS — I73.9 PAD (PERIPHERAL ARTERY DISEASE): ICD-10-CM

## 2025-05-27 DIAGNOSIS — I49.9 IRREGULAR HEART RHYTHM: ICD-10-CM

## 2025-05-27 DIAGNOSIS — D64.9 ANEMIA, UNSPECIFIED TYPE: ICD-10-CM

## 2025-05-27 DIAGNOSIS — N17.9 AKI (ACUTE KIDNEY INJURY): ICD-10-CM

## 2025-05-27 DIAGNOSIS — K44.9 HIATAL HERNIA: ICD-10-CM

## 2025-05-27 DIAGNOSIS — Z98.1 S/P LUMBAR SPINAL FUSION: ICD-10-CM

## 2025-05-27 DIAGNOSIS — K74.60 HEPATIC CIRRHOSIS, UNSPECIFIED HEPATIC CIRRHOSIS TYPE, UNSPECIFIED WHETHER ASCITES PRESENT (MULTI): ICD-10-CM

## 2025-05-27 DIAGNOSIS — C61 PROSTATE CANCER (MULTI): ICD-10-CM

## 2025-05-27 DIAGNOSIS — E78.2 MIXED HYPERLIPIDEMIA: ICD-10-CM

## 2025-05-27 DIAGNOSIS — I50.9 HEART FAILURE, UNSPECIFIED HF CHRONICITY, UNSPECIFIED HEART FAILURE TYPE: ICD-10-CM

## 2025-05-27 DIAGNOSIS — G47.00 INSOMNIA, UNSPECIFIED TYPE: ICD-10-CM

## 2025-05-27 DIAGNOSIS — G99.2 MYELOPATHY CONCURRENT WITH AND DUE TO SPINAL STENOSIS OF THORACIC REGION (MULTI): ICD-10-CM

## 2025-05-27 DIAGNOSIS — E11.65 TYPE 2 DIABETES MELLITUS WITH HYPERGLYCEMIA, UNSPECIFIED WHETHER LONG TERM INSULIN USE (MULTI): ICD-10-CM

## 2025-05-27 DIAGNOSIS — I10 PRIMARY HYPERTENSION: ICD-10-CM

## 2025-05-27 PROBLEM — J96.91 RESPIRATORY FAILURE WITH HYPOXIA, UNSPECIFIED CHRONICITY: Status: ACTIVE | Noted: 2025-05-27

## 2025-05-27 PROBLEM — L97.828 NON-PRESSURE CHRONIC ULCER OF OTHER PART OF LEFT LOWER LEG WITH OTHER SPECIFIED SEVERITY (MULTI): Status: RESOLVED | Noted: 2025-01-25 | Resolved: 2025-05-27

## 2025-05-27 PROBLEM — E11.9 DIABETES MELLITUS WITHOUT COMPLICATION: Status: RESOLVED | Noted: 2024-03-29 | Resolved: 2025-05-27

## 2025-05-27 PROBLEM — J96.91 RESPIRATORY FAILURE WITH HYPOXIA, UNSPECIFIED CHRONICITY: Status: RESOLVED | Noted: 2025-05-27 | Resolved: 2025-05-27

## 2025-05-27 PROCEDURE — 1160F RVW MEDS BY RX/DR IN RCRD: CPT | Performed by: STUDENT IN AN ORGANIZED HEALTH CARE EDUCATION/TRAINING PROGRAM

## 2025-05-27 PROCEDURE — 1159F MED LIST DOCD IN RCRD: CPT | Performed by: STUDENT IN AN ORGANIZED HEALTH CARE EDUCATION/TRAINING PROGRAM

## 2025-05-27 PROCEDURE — 1170F FXNL STATUS ASSESSED: CPT | Performed by: STUDENT IN AN ORGANIZED HEALTH CARE EDUCATION/TRAINING PROGRAM

## 2025-05-27 PROCEDURE — 99215 OFFICE O/P EST HI 40 MIN: CPT | Performed by: STUDENT IN AN ORGANIZED HEALTH CARE EDUCATION/TRAINING PROGRAM

## 2025-05-27 PROCEDURE — 99397 PER PM REEVAL EST PAT 65+ YR: CPT | Performed by: STUDENT IN AN ORGANIZED HEALTH CARE EDUCATION/TRAINING PROGRAM

## 2025-05-27 PROCEDURE — 1126F AMNT PAIN NOTED NONE PRSNT: CPT | Performed by: STUDENT IN AN ORGANIZED HEALTH CARE EDUCATION/TRAINING PROGRAM

## 2025-05-27 PROCEDURE — 3078F DIAST BP <80 MM HG: CPT | Performed by: STUDENT IN AN ORGANIZED HEALTH CARE EDUCATION/TRAINING PROGRAM

## 2025-05-27 PROCEDURE — 1036F TOBACCO NON-USER: CPT | Performed by: STUDENT IN AN ORGANIZED HEALTH CARE EDUCATION/TRAINING PROGRAM

## 2025-05-27 PROCEDURE — 3074F SYST BP LT 130 MM HG: CPT | Performed by: STUDENT IN AN ORGANIZED HEALTH CARE EDUCATION/TRAINING PROGRAM

## 2025-05-27 PROCEDURE — 93000 ELECTROCARDIOGRAM COMPLETE: CPT | Performed by: STUDENT IN AN ORGANIZED HEALTH CARE EDUCATION/TRAINING PROGRAM

## 2025-05-27 PROCEDURE — G0439 PPPS, SUBSEQ VISIT: HCPCS | Performed by: STUDENT IN AN ORGANIZED HEALTH CARE EDUCATION/TRAINING PROGRAM

## 2025-05-27 ASSESSMENT — ACTIVITIES OF DAILY LIVING (ADL)
MANAGING_FINANCES: INDEPENDENT
TAKING_MEDICATION: INDEPENDENT
DRESSING: INDEPENDENT
DOING_HOUSEWORK: INDEPENDENT
BATHING: INDEPENDENT
GROCERY_SHOPPING: INDEPENDENT

## 2025-05-27 ASSESSMENT — ENCOUNTER SYMPTOMS
DIZZINESS: 0
DIARRHEA: 0
FEVER: 0
MYALGIAS: 0
DEPRESSION: 0
LOSS OF SENSATION IN FEET: 0
VOMITING: 0
PALPITATIONS: 0
LIGHT-HEADEDNESS: 0
CHILLS: 0
DIAPHORESIS: 0
UNEXPECTED WEIGHT CHANGE: 0
OCCASIONAL FEELINGS OF UNSTEADINESS: 1
NAUSEA: 0
DYSURIA: 0
SHORTNESS OF BREATH: 0

## 2025-05-27 ASSESSMENT — PAIN SCALES - GENERAL: PAINLEVEL_OUTOF10: 0-NO PAIN

## 2025-05-27 NOTE — PROGRESS NOTES
Subjective   Reason for Visit: Guero Cope is an80 y.o. male who presents for a Medicare Wellness visit.    Past Medical, Surgical, and Family History reviewed and updated in chart.    Reviewed all medications by prescribing practitioner or clinical pharmacist (such as prescriptions, OTCs, herbal therapies and supplements) and documented in the medical record.    History of Present Illness  The patient is an 80-year-old male presenting for an annual wellness visit. He feels well and denies any complaints.     He has a history of myasthenia gravis, managed by neurology at [facility]. He has no current symptoms related to myasthenia gravis.    He has a history of anemia and fatigue, managed by neurology.     His hypertension is well controlled on the current regimen.    He has a history of PAD, CAD, hyperlipidemia, and atrial fibrillation, managed by Dr. Roque in cardiology.    He has a history of kidney stones, managed by urology. His normal uric acid levels were noted in 03/2024. A renal cyst was seen on imaging, and he was advised to follow up with urology.    He has a history of recurrent prostate cancer and is receiving treatment from urology. He reports he has undergone five rounds of radiation therapy.    He has a history of stage 3a CKD, which is stable, and he was advised to see nephrology.    He is managed by Dr. Greene in endocrinology for hyperglycemia and insulin management. He is currently not on insulin but may need to resume due to a slight increase in blood sugar levels. He uses Dexcom 7 for monitoring.    He has a history of insomnia, which has improved.    He has a history of Cuadra's esophagus and hiatal hernia, with reflux controlled on current medication.    He was referred to hepatology for mild hepatic steatosis.    His lower extremity edema has resolved. He was previously admitted and discharged from [facility] for worsening edema and was given Lasix. He had acute on chronic  "systolic and diastolic heart failure, was diuresed, and had severe hypomagnesemia repleted. He had acute on chronic anemia and was advised to discuss with cardiology regarding Lovenox. He continued on Eliquis and is seeing wound care for open skin wounds. No signs of bleeding.     He has a history of lumbar fusion myelopathy, managed by [facility].     He has no current jittery sensations. He has no fever, chills, sweats, unexpected weight change, vision or hearing changes, shortness of breath, chest pain, heart fluttering, nausea, vomiting, diarrhea, pain, dysuria, scrotal swelling, testicular pain, trouble in cold or heat more than usual, lightheadedness, dizziness, syncope, skin changes, rash, or myalgia.      Review of Systems   Constitutional:  Negative for chills, diaphoresis, fever and unexpected weight change.   HENT:  Negative for hearing loss.    Eyes:  Negative for visual disturbance.   Respiratory:  Negative for shortness of breath.    Cardiovascular:  Positive for leg swelling (better). Negative for chest pain and palpitations.   Gastrointestinal:  Negative for diarrhea, nausea and vomiting.   Endocrine: Negative for cold intolerance and heat intolerance.   Genitourinary:  Negative for dysuria, scrotal swelling and testicular pain.   Musculoskeletal:  Negative for myalgias.   Skin:  Negative for rash.   Neurological:  Negative for dizziness, syncope and light-headedness.     ROS otherwise negative aside from what was mentioned above in HPI.    Objective     /64 (BP Location: Left arm, Patient Position: Sitting, BP Cuff Size: Adult)   Pulse 63   Temp 36.9 °C (98.4 °F) (Skin)   Resp 14   Ht 1.778 m (5' 10\")   Wt 68 kg (150 lb)   SpO2 96%   BMI 21.52 kg/m²      Current Medications[1]    Physical Exam  GENERAL: Alert, cooperative, well developed, no acute distress, no diaphoresis.  HEENT: Normocephalic, normal oropharynx, moist mucous membranes, ear canals and tympanic membranes normal " bilaterally, no oral lesions, no oral erythema.   NECK: Supple, trachea midline, no cervical lymphadenopathy.  CHEST: Clear to auscultation bilaterally, no wheezes, rhonchi, or rales.  CARDIOVASCULAR: Normal heart rate and irregular rhythm, S1 and S2 normal without murmurs, rubs, or gallops.  ABDOMEN: Soft, non-tender, non-distended, normal bowel sounds present in all four quadrants. No rebound or guarding.  EXTREMITIES: No cyanosis or edema.  NEUROLOGICAL: Moves all extremities without gross motor deficit.  PSYCH: Mood and affect normal.       Assessment & Plan  Myasthenia Gravis  - Managed by neurology at [facility]  - Continues follow-up  - Meds per neuro    Anemia  - Managed by neurology  - Blood counts slightly low but improved  - Continues follow-up w/ neurology    RLE DVT  -Dx 4/25, unclear cause but has prostate cancer being treated, possibly provoked  -Was prescribed 2 month supply upon discharge from [facility] and was filled on insurance claims  -Will confirm he has apixaban.   -Referred to heme.     Hypertension  - Well controlled on current regimen  - Blood pressure stable  - Continues monitoring    PAD, CAD, Hyperlipidemia, A-Fib, CHF  - Managed by cardiology  - EKG today with sinus tachycardia with arrhythmia with HR approx 130. He refuses ER evaluation for this and discussed can be life threatening, recommended he not drive as he can hurt himself or other and he refuses.   - Advised he reach out to Dr. Roque' office ASAP re: this.   - Labs today     Kidney Stones  - Managed by urology  - Normal uric acid levels in 03/2024  - Renal cyst seen on imaging, advised follow-up with urology    Prostate Cancer  - Recurrence, receiving treatment from Urology    CKD Stage IIIa  - Stable, recent labs show decline in kidney function  - Creatinine increased to 1.82, GFR decreased to 37  - Advised nephro f/u  - Pt reports lasix dosage reduced from 40 mg to 20 mg and advised he reach out to cards re: new kidney  injury.   - Repeat lab test in one week    Hyperglycemia  - Managed by Dr. Greene in endocrinology, management per nedo  - Uses Dexcom 7 for monitoring    Insomnia  - Improved  - Continues monitoring    Cuadra's Esophagus and Hiatal Hernia  - Reflux controlled on current medication  - Continues follow-up    Hepatic Steatosis/?cirrhosis  - Referred to hepatology, advised to schedule    Lower Extremity Edema  - Resolved  - Previously admitted for worsening edema, given Lasix  - Continues monitoring    Lumbar Fusion Myelopathy  - F/u and management per [facility], not major issue at present.     Skin Tears  - Managed by wound care  - Continues monitoring    Jitteriness  - No current jittery sensations    Health Maintenance  -Prostate Cancer Screening: Per urology  -Vaccinations: Reports UTD pneumonia vaccine in last 2 years, flu vaccine, shingles, covid vaccination and booster and reports utd pneumonia, UTD TDAP, due 2026  -Lung Cancer Screening: Not indicated due to age  -AAA Screening: Not indicated due to age  -Colonoscopy: Due 2025, he was told no longer needs by Dr. Kimble personally per patient.     CPE completed.  Advised to keep a heart healthy, low fat  diet with fruits and veggies like Mediterranean diet.  Advised on the importance of exercise and maintaining 150 minutes of exercise per week (30 minutes per day 5 days a week).  Advised on regular eye and dental visits.  Discussed age appropriate cancer screening, immunizations and recommendations given.  Discussed avoiding illicit drugs and tobacco. Advised on appropriate use of alcohol.  Advised to wear seat belt.    F/u 3 months  MWV 1 year  F/u sooner prn    Assessment/Plan   Assessment & Plan  Stage 3a chronic kidney disease (Multi)         Tachycardia    Orders:    ECG 12 lead (Clinic Performed)    Basic metabolic panel; Future    Magnesium; Future    Hepatic cirrhosis, unspecified hepatic cirrhosis type, unspecified whether ascites present (Multi)          Myasthenia gravis without (acute) exacerbation         Myelopathy (Multi)         Myelopathy concurrent with and due to spinal stenosis of thoracic region (Multi)         Heart failure, unspecified HF chronicity, unspecified heart failure type         Chronic systolic CHF (congestive heart failure)         Hypertensive heart and chronic kidney disease with heart failure and stage 1 through stage 4 chronic kidney disease, or unspecified chronic kidney disease         Atrial fibrillation, unspecified type (Multi)         Longstanding persistent atrial fibrillation (Multi)         Prostate cancer (Multi)         Controlled type 2 diabetes mellitus with other specified complication, unspecified whether long term insulin use (Multi)         Type 2 diabetes mellitus with diabetic chronic kidney disease, unspecified CKD stage, unspecified whether long term insulin use         Type 2 diabetes mellitus with hyperglycemia, unspecified whether long term insulin use (Multi)         Type 2 diabetes mellitus with other diabetic neurological complication         Irregular heart rhythm         RENE (acute kidney injury)         Primary hypertension         Anemia, unspecified type         Insomnia, unspecified type         Hiatal hernia         PAD (peripheral artery disease)         Mixed hyperlipidemia         Kidney cysts         S/P lumbar spinal fusion         Hepatic steatosis         Acute deep vein thrombosis (DVT) of proximal vein of right lower extremity    Orders:    Referral To Hematology and Oncology; Future           Madi Sloan DO            This medical note was created with the assistance of artificial intelligence (AI) for documentation purposes. The content has been reviewed and confirmed by the healthcare provider for accuracy and completeness. Patient consented to the use of audio recording and use of AI during their visit.          [1]   Current Outpatient Medications:     allopurinol (Zyloprim) 300 mg  "tablet, Take 1 tablet (300 mg) by mouth once daily., Disp: , Rfl:     amLODIPine (Norvasc) 10 mg tablet, Take 1 tablet (10 mg) by mouth once daily., Disp: , Rfl:     aspirin 81 mg EC tablet, Take 1 tablet (81 mg) by mouth once daily., Disp: , Rfl:     coenzyme Q-10 100 mg capsule, Take 1 capsule (100 mg) by mouth once daily., Disp: , Rfl:     Dexcom G7 Sensor device, , Disp: , Rfl:     diclofenac sodium (Voltaren) 1 % gel, APPLY 4 GRAMS TOPICALLY TO AFFECTED AREA EVERY 6 HOURS AS NEEDED, Disp: , Rfl:     fluticasone (Flonase) 50 mcg/actuation nasal spray, Administer 1 spray into each nostril once daily., Disp: , Rfl:     furosemide (Lasix) 20 mg tablet, Take 1 tablet (20 mg) by mouth early in the morning.., Disp: , Rfl:     ketoconazole (NIZOral) 2 % shampoo, WASH THE AFFECTED AREA TWO TO THREE DAYS A WEEK AS NEEDED. DECREASE WHEN CLEAR/IMPROVED, Disp: , Rfl:     losartan (Cozaar) 50 mg tablet, Take 1 tablet (50 mg) by mouth once daily. (Patient taking differently: Take 0.5 tablets (25 mg) by mouth once daily.), Disp: , Rfl:     metFORMIN  mg 24 hr tablet, TAKE 3 TABLETS(1500 MG) BY MOUTH DAILY IN THE EVENING WITH MEALS. DO NOT CRUSH, CHEW, OR SPLIT, Disp: 270 tablet, Rfl: 2    mycophenolate (Cellcept) 500 mg tablet, Take 2 tablets (1,000 mg) by mouth 2 times a day., Disp: , Rfl:     pen needle, diabetic (BD Ultra-Fine Micro Pen Needle) 32 gauge x 1/4\" needle, Use as directed four times daily with insulin., Disp: 400 each, Rfl: 3    predniSONE (Deltasone) 2.5 mg tablet, Take 1 tablet (2.5 mg) by mouth once daily. TAKE 2.5 MG IN ADDITION TO 5 MG FOR A TOTAL OF 7.5 MG DAILY., Disp: , Rfl:     tamsulosin (Flomax) 0.4 mg 24 hr capsule, Take 2 capsules (0.8 mg) by mouth once daily., Disp: , Rfl:     trospium (Sanctura XR) 60 mg 24 hour capsule, Take 1 capsule (60 mg) by mouth early in the morning.., Disp: , Rfl:     TURMERIC ORAL, Take by mouth. OTC, Disp: , Rfl:     ciprofloxacin (Cipro) 500 mg tablet, 1 tablet " (500 mg). (Patient not taking: Reported on 5/27/2025), Disp: , Rfl:     Eliquis DVT-PE Treat 30D Start 5 mg (74 tabs) tablet, Take 2 tablets (10 mg) by mouth twice daily for 7 days. Then take 1 tablet (5 mg) by mouth twice daily for 23 days, Disp: , Rfl:     insulin lispro (HumaLOG KwikPen Insulin) 100 unit/mL injection, Inject as directed under the skin 14 units with breakfast, 22 units with lunch and 14 units with dinner plus sliding scale up to 86 units per day. (Patient not taking: Reported on 5/27/2025), Disp: 15 mL, Rfl: 5    tacrolimus (Protopic) 0.1 % ointment, APPLY TOPICALLY TO FACE AND NECK TWICE DAILY (Patient not taking: Reported on 5/27/2025), Disp: , Rfl:     triamcinolone (Kenalog) 0.1 % cream, APPLY ENOUGH TO COVER RASH TWICE DAILY FOR UP TO 2 WEEKS PER MONTH AS NEEDED (Patient not taking: Reported on 5/27/2025), Disp: , Rfl:

## 2025-05-27 NOTE — Clinical Note
Can you call him and confirm if he has eliquis? He needs to be taking this for his blood clot and needs to let us know when he is running out.  Is he also seeing a specialist for his blood clot? If not I referred to blood doctor for this-possible prostate cancer is playing a role with this.  Lung CT also showed scarring-may be helpfult o see lung doctor, can refer if he is interested.

## 2025-05-28 LAB
CHOLEST SERPL-MCNC: 156 MG/DL
CHOLEST/HDLC SERPL: 3.3 (CALC)
HDLC SERPL-MCNC: 47 MG/DL
LDLC SERPL CALC-MCNC: 81 MG/DL (CALC)
NONHDLC SERPL-MCNC: 109 MG/DL (CALC)
TRIGL SERPL-MCNC: 179 MG/DL

## 2025-05-28 NOTE — ASSESSMENT & PLAN NOTE
Orders:    ECG 12 lead (Clinic Performed)    Basic metabolic panel; Future    Magnesium; Future

## 2025-05-29 DIAGNOSIS — N17.9 AKI (ACUTE KIDNEY INJURY): Primary | ICD-10-CM

## 2025-05-29 DIAGNOSIS — I82.4Y1 ACUTE DEEP VEIN THROMBOSIS (DVT) OF PROXIMAL VEIN OF RIGHT LOWER EXTREMITY: ICD-10-CM

## 2025-05-30 LAB
ANION GAP SERPL CALCULATED.4IONS-SCNC: 13 MMOL/L (CALC) (ref 7–17)
BUN SERPL-MCNC: 28 MG/DL (ref 7–25)
BUN/CREAT SERPL: 18 (CALC) (ref 6–22)
CALCIUM SERPL-MCNC: 8.8 MG/DL (ref 8.6–10.3)
CHLORIDE SERPL-SCNC: 104 MMOL/L (ref 98–110)
CO2 SERPL-SCNC: 26 MMOL/L (ref 20–32)
CREAT SERPL-MCNC: 1.52 MG/DL (ref 0.7–1.22)
EGFRCR SERPLBLD CKD-EPI 2021: 46 ML/MIN/1.73M2
GLUCOSE SERPL-MCNC: 128 MG/DL (ref 65–99)
MAGNESIUM SERPL-MCNC: 1.8 MG/DL (ref 1.5–2.5)
POTASSIUM SERPL-SCNC: 3.7 MMOL/L (ref 3.5–5.3)
SODIUM SERPL-SCNC: 143 MMOL/L (ref 135–146)

## 2025-06-20 ENCOUNTER — HOSPITAL ENCOUNTER (OUTPATIENT)
Dept: RADIOLOGY | Facility: CLINIC | Age: 80
Discharge: HOME | End: 2025-06-20
Payer: MEDICARE

## 2025-06-20 DIAGNOSIS — N17.9 AKI (ACUTE KIDNEY INJURY): ICD-10-CM

## 2025-06-20 DIAGNOSIS — Q61.8 OTHER CYSTIC KIDNEY DISEASES: ICD-10-CM

## 2025-06-20 DIAGNOSIS — N20.0 KIDNEY STONES: ICD-10-CM

## 2025-06-20 DIAGNOSIS — I10 ESSENTIAL HYPERTENSION: ICD-10-CM

## 2025-06-20 DIAGNOSIS — N18.31 CKD STAGE 3A, GFR 45-59 ML/MIN (MULTI): ICD-10-CM

## 2025-06-20 PROCEDURE — 76770 US EXAM ABDO BACK WALL COMP: CPT | Performed by: STUDENT IN AN ORGANIZED HEALTH CARE EDUCATION/TRAINING PROGRAM

## 2025-06-20 PROCEDURE — 76770 US EXAM ABDO BACK WALL COMP: CPT

## 2025-06-21 ENCOUNTER — APPOINTMENT (OUTPATIENT)
Dept: RADIOLOGY | Facility: CLINIC | Age: 80
End: 2025-06-21
Payer: MEDICARE

## 2025-06-23 ENCOUNTER — TELEPHONE (OUTPATIENT)
Dept: NEPHROLOGY | Facility: CLINIC | Age: 80
End: 2025-06-23
Payer: MEDICARE

## 2025-06-23 NOTE — TELEPHONE ENCOUNTER
----- Message from Neil Hernandez sent at 6/22/2025 11:04 AM EDT -----  There is a 3 mm stone in L kidney.  ----- Message -----  From: Interface, Radiology Results In  Sent: 6/21/2025   3:35 PM EDT  To: Neil Hernandez MD

## 2025-06-25 DIAGNOSIS — N17.9 AKI (ACUTE KIDNEY INJURY): ICD-10-CM

## 2025-06-25 DIAGNOSIS — I82.4Y1 ACUTE DEEP VEIN THROMBOSIS (DVT) OF PROXIMAL VEIN OF RIGHT LOWER EXTREMITY: ICD-10-CM

## 2025-06-25 RX ORDER — APIXABAN 2.5 MG/1
TABLET, FILM COATED ORAL
Qty: 60 TABLET | Refills: 0 | Status: SHIPPED | OUTPATIENT
Start: 2025-06-25

## 2025-07-10 ENCOUNTER — APPOINTMENT (OUTPATIENT)
Dept: NEPHROLOGY | Facility: CLINIC | Age: 80
End: 2025-07-10
Payer: MEDICARE

## 2025-07-10 VITALS
HEART RATE: 69 BPM | HEIGHT: 70 IN | DIASTOLIC BLOOD PRESSURE: 66 MMHG | SYSTOLIC BLOOD PRESSURE: 132 MMHG | BODY MASS INDEX: 21.76 KG/M2 | WEIGHT: 152 LBS

## 2025-07-10 DIAGNOSIS — Q61.8 OTHER CYSTIC KIDNEY DISEASES: ICD-10-CM

## 2025-07-10 DIAGNOSIS — N20.0 KIDNEY STONES: ICD-10-CM

## 2025-07-10 DIAGNOSIS — I10 ESSENTIAL HYPERTENSION: ICD-10-CM

## 2025-07-10 DIAGNOSIS — N18.31 CKD STAGE 3A, GFR 45-59 ML/MIN (MULTI): Primary | ICD-10-CM

## 2025-07-10 PROCEDURE — 3075F SYST BP GE 130 - 139MM HG: CPT | Performed by: INTERNAL MEDICINE

## 2025-07-10 PROCEDURE — 99213 OFFICE O/P EST LOW 20 MIN: CPT | Performed by: INTERNAL MEDICINE

## 2025-07-10 PROCEDURE — 3078F DIAST BP <80 MM HG: CPT | Performed by: INTERNAL MEDICINE

## 2025-07-10 PROCEDURE — 1036F TOBACCO NON-USER: CPT | Performed by: INTERNAL MEDICINE

## 2025-07-10 RX ORDER — GLIPIZIDE 5 MG/1
1 TABLET ORAL
COMMUNITY
Start: 2025-07-02

## 2025-07-10 RX ORDER — ROSUVASTATIN CALCIUM 20 MG/1
20 TABLET, COATED ORAL
COMMUNITY
Start: 2025-06-16

## 2025-07-10 RX ORDER — EMPAGLIFLOZIN 10 MG/1
10 TABLET, FILM COATED ORAL DAILY
COMMUNITY

## 2025-07-10 NOTE — PROGRESS NOTES
Guero Cope   80 y.o.    @@  [facility]/Room: [id number]/Room/bed info not found    Subjective:   The patient is being seen for a routine clinic follow-up of chronic kidney disease. Recently, the disease has been stable. Disease complications:  No hyperkalemia, no hypocalcemia, no hyperphosphatemia, no metabolic acidosis, no coagulopathy, no uremic encephalopathy, no neuropathy and no renal osteodystrophy. The patient is currently asymptomatic. No associated symptoms are reported.       Meds:   Current Medications[1]       ROS:  The patient is awake and oriented. No dizziness or lightheadedness. No chills and no fever. No headaches. No nausea and no vomiting. No shortness of breath. No cough. No chest pain. No abdominal pain. No diarrhea. No hematemesis or hemoptysis. No hematuria. No rectal bleeding. No melena. No epistaxis. No urinary symptoms. No flank pain. No leg edema. No itching. Overall, the rest of the review of systems is also negative.  12 point review of systems otherwise negative as stated in HPI.        Physical Examination:        Vitals:    07/10/25 1047   BP: 132/66   Pulse: 69     General: The patient is awake, oriented, and is not in any distress.  Head and Neck: Normocephalic. No periorbital edema.  Eyes: non-icteric  Respiratory: Symmetric chest expansion. No respiratory distress.  Skin: No maculopapular rash.  Musculoskeletal: No peripheral edema.  Neuro Exam: Speech is fluent. Moves extremities.    Imaging:  === 06/20/25 ===    US RENAL COMPLETE    - Impression -  Nonobstructing left renal calculi, measuring up to 3 mm. No  hydronephrosis. Bilateral simple cysts, including a 1.6 cm left upper  pole thinly septated cyst. Hepatic steatosis.    Signed by: Bean Lowe 6/21/2025 3:34 PM  Dictation workstation:   [id number]       Blood Labs:  No results found for this or any previous visit (from the past 24 hours).   Lab Results   Component Value Date    PTH 29 02/21/2025    PROTUR 100 (2+) (A)  12/08/2021      Lab Results   Component Value Date    GLUCOSE 128 (H) 05/29/2025    CALCIUM 8.8 05/29/2025     05/29/2025    K 3.7 05/29/2025    CO2 26 05/29/2025     05/29/2025    BUN 28 (H) 05/29/2025    CREATININE 1.52 (H) 05/29/2025             Assessment and Plan:  #1 chronic kidney disease stage III.  Last creatinine was 1.52. Normal K and bicarb level.      2.  Diabetes.  No proteinuria based on the last spot urine protein to creatinine ratio.     3.  Hypertension.  Blood pressure is acceptable.  Continue the current medications.     4.  Bosniak 2 renal cyst.  He follows with urologist.     5.  Nephrolithiasis.  He follows with urologist.          Neil Hernandez MD  Senior Attending Physician  Director of Onco-Nephrology Program  Division of Nephrology & Hypertension  [facility]       [1]   Current Outpatient Medications   Medication Sig Dispense Refill    allopurinol (Zyloprim) 300 mg tablet Take 1 tablet (300 mg) by mouth once daily.      amLODIPine (Norvasc) 10 mg tablet Take 1 tablet (10 mg) by mouth once daily.      aspirin 81 mg EC tablet Take 1 tablet (81 mg) by mouth once daily.      coenzyme Q-10 100 mg capsule Take 1 capsule (100 mg) by mouth once daily.      Dexcom G7 Sensor device       diclofenac sodium (Voltaren) 1 % gel APPLY 4 GRAMS TOPICALLY TO AFFECTED AREA EVERY 6 HOURS AS NEEDED      Eliquis 2.5 mg tablet TAKE 1 TABLET(2.5 MG) BY MOUTH TWICE DAILY 60 tablet 0    fluticasone (Flonase) 50 mcg/actuation nasal spray Administer 1 spray into each nostril once daily.      furosemide (Lasix) 20 mg tablet Take 1 tablet (20 mg) by mouth early in the morning..      glipiZIDE (Glucotrol) 5 mg tablet Take 1 tablet (5 mg) by mouth early in the morning..      Jardiance 10 mg tablet Take 1 tablet (10 mg) by mouth once daily.      ketoconazole (NIZOral) 2 % shampoo WASH THE AFFECTED AREA TWO TO THREE DAYS A WEEK AS NEEDED. DECREASE WHEN CLEAR/IMPROVED       "metFORMIN  mg 24 hr tablet TAKE 3 TABLETS(1500 MG) BY MOUTH DAILY IN THE EVENING WITH MEALS. DO NOT CRUSH, CHEW, OR SPLIT 270 tablet 2    mycophenolate (Cellcept) 500 mg tablet Take 2 tablets (1,000 mg) by mouth 2 times a day.      pen needle, diabetic (BD Ultra-Fine Micro Pen Needle) 32 gauge x 1/4\" needle Use as directed four times daily with insulin. 400 each 3    predniSONE (Deltasone) 2.5 mg tablet Take 1 tablet (2.5 mg) by mouth once daily. TAKE 2.5 MG IN ADDITION TO 5 MG FOR A TOTAL OF 7.5 MG DAILY. (Patient taking differently: Take 2 tablets (5 mg) by mouth once daily. TAKE 2.5 MG IN ADDITION TO 5 MG FOR A TOTAL OF 7.5 MG DAILY.)      rosuvastatin (Crestor) 20 mg tablet Take 1 tablet (20 mg) by mouth once daily.      tamsulosin (Flomax) 0.4 mg 24 hr capsule Take 2 capsules (0.8 mg) by mouth once daily.      trospium (Sanctura XR) 60 mg 24 hour capsule Take 1 capsule (60 mg) by mouth early in the morning..      TURMERIC ORAL Take by mouth. OTC      losartan (Cozaar) 50 mg tablet Take 1 tablet (50 mg) by mouth once daily. (Patient taking differently: Take 0.5 tablets (25 mg) by mouth once daily.)       No current facility-administered medications for this visit.     "

## 2025-07-16 ENCOUNTER — APPOINTMENT (OUTPATIENT)
Dept: NEPHROLOGY | Facility: CLINIC | Age: 80
End: 2025-07-16
Payer: MEDICARE

## 2025-07-17 ENCOUNTER — APPOINTMENT (OUTPATIENT)
Dept: NEPHROLOGY | Facility: CLINIC | Age: 80
End: 2025-07-17
Payer: MEDICARE

## 2025-07-22 DIAGNOSIS — I82.4Y1 ACUTE DEEP VEIN THROMBOSIS (DVT) OF PROXIMAL VEIN OF RIGHT LOWER EXTREMITY: ICD-10-CM

## 2025-07-22 DIAGNOSIS — N17.9 AKI (ACUTE KIDNEY INJURY): ICD-10-CM

## 2025-07-23 RX ORDER — APIXABAN 2.5 MG/1
2.5 TABLET, FILM COATED ORAL
Qty: 60 TABLET | Refills: 1 | Status: SHIPPED | OUTPATIENT
Start: 2025-07-23

## 2025-08-27 ENCOUNTER — APPOINTMENT (OUTPATIENT)
Dept: PRIMARY CARE | Facility: CLINIC | Age: 80
End: 2025-08-27
Payer: MEDICARE

## 2025-08-27 VITALS
DIASTOLIC BLOOD PRESSURE: 60 MMHG | WEIGHT: 160 LBS | RESPIRATION RATE: 16 BRPM | HEART RATE: 62 BPM | OXYGEN SATURATION: 99 % | BODY MASS INDEX: 22.96 KG/M2 | TEMPERATURE: 97.1 F | SYSTOLIC BLOOD PRESSURE: 124 MMHG

## 2025-08-27 DIAGNOSIS — I25.10 CORONARY ARTERY DISEASE INVOLVING NATIVE CORONARY ARTERY OF NATIVE HEART WITHOUT ANGINA PECTORIS: ICD-10-CM

## 2025-08-27 DIAGNOSIS — C61 PROSTATE CANCER (MULTI): ICD-10-CM

## 2025-08-27 DIAGNOSIS — K21.9 GASTROESOPHAGEAL REFLUX DISEASE, UNSPECIFIED WHETHER ESOPHAGITIS PRESENT: ICD-10-CM

## 2025-08-27 DIAGNOSIS — G70.00 MYASTHENIA GRAVIS: ICD-10-CM

## 2025-08-27 DIAGNOSIS — K76.0 HEPATIC STEATOSIS: ICD-10-CM

## 2025-08-27 DIAGNOSIS — I10 PRIMARY HYPERTENSION: ICD-10-CM

## 2025-08-27 DIAGNOSIS — K74.60 HEPATIC CIRRHOSIS, UNSPECIFIED HEPATIC CIRRHOSIS TYPE, UNSPECIFIED WHETHER ASCITES PRESENT (MULTI): Primary | ICD-10-CM

## 2025-08-27 DIAGNOSIS — I73.9 PAD (PERIPHERAL ARTERY DISEASE): ICD-10-CM

## 2025-08-27 DIAGNOSIS — M43.25 FUSION OF SPINE OF THORACOLUMBAR REGION: ICD-10-CM

## 2025-08-27 DIAGNOSIS — D64.9 ANEMIA, UNSPECIFIED TYPE: ICD-10-CM

## 2025-08-27 DIAGNOSIS — J98.4 SCARRING OF LUNG: ICD-10-CM

## 2025-08-27 DIAGNOSIS — N18.31 STAGE 3A CHRONIC KIDNEY DISEASE (MULTI): ICD-10-CM

## 2025-08-27 DIAGNOSIS — R91.8 GROUND GLASS OPACITY PRESENT ON IMAGING OF LUNG: Primary | ICD-10-CM

## 2025-08-27 PROCEDURE — 1159F MED LIST DOCD IN RCRD: CPT | Performed by: STUDENT IN AN ORGANIZED HEALTH CARE EDUCATION/TRAINING PROGRAM

## 2025-08-27 PROCEDURE — 99214 OFFICE O/P EST MOD 30 MIN: CPT | Performed by: STUDENT IN AN ORGANIZED HEALTH CARE EDUCATION/TRAINING PROGRAM

## 2025-08-27 PROCEDURE — G2211 COMPLEX E/M VISIT ADD ON: HCPCS | Performed by: STUDENT IN AN ORGANIZED HEALTH CARE EDUCATION/TRAINING PROGRAM

## 2025-08-27 PROCEDURE — 1036F TOBACCO NON-USER: CPT | Performed by: STUDENT IN AN ORGANIZED HEALTH CARE EDUCATION/TRAINING PROGRAM

## 2025-08-27 PROCEDURE — 1160F RVW MEDS BY RX/DR IN RCRD: CPT | Performed by: STUDENT IN AN ORGANIZED HEALTH CARE EDUCATION/TRAINING PROGRAM

## 2025-08-27 PROCEDURE — 3078F DIAST BP <80 MM HG: CPT | Performed by: STUDENT IN AN ORGANIZED HEALTH CARE EDUCATION/TRAINING PROGRAM

## 2025-08-27 PROCEDURE — 3074F SYST BP LT 130 MM HG: CPT | Performed by: STUDENT IN AN ORGANIZED HEALTH CARE EDUCATION/TRAINING PROGRAM

## 2025-08-27 RX ORDER — DOXYCYCLINE HYCLATE 50 MG/1
1 CAPSULE, GELATIN COATED ORAL
COMMUNITY
Start: 2025-08-19

## 2025-08-27 ASSESSMENT — ENCOUNTER SYMPTOMS
NAUSEA: 0
VOMITING: 0
FEVER: 0
DIZZINESS: 0
LIGHT-HEADEDNESS: 0
DYSURIA: 0
SHORTNESS OF BREATH: 0
DIAPHORESIS: 0
CHILLS: 0
DIARRHEA: 0

## 2025-09-02 ENCOUNTER — APPOINTMENT (OUTPATIENT)
Facility: CLINIC | Age: 80
End: 2025-09-02
Payer: MEDICARE

## 2025-09-02 ASSESSMENT — PAIN SCALES - GENERAL: PAINLEVEL_OUTOF10: 0-NO PAIN

## 2025-09-09 ENCOUNTER — APPOINTMENT (OUTPATIENT)
Facility: CLINIC | Age: 80
End: 2025-09-09
Payer: MEDICARE

## 2025-11-03 ENCOUNTER — APPOINTMENT (OUTPATIENT)
Facility: CLINIC | Age: 80
End: 2025-11-03
Payer: MEDICARE

## 2026-01-05 ENCOUNTER — APPOINTMENT (OUTPATIENT)
Dept: PRIMARY CARE | Facility: CLINIC | Age: 81
End: 2026-01-05
Payer: MEDICARE

## 2026-01-08 ENCOUNTER — APPOINTMENT (OUTPATIENT)
Dept: NEPHROLOGY | Facility: CLINIC | Age: 81
End: 2026-01-08
Payer: MEDICARE